# Patient Record
Sex: FEMALE | Race: BLACK OR AFRICAN AMERICAN | NOT HISPANIC OR LATINO | Employment: FULL TIME | URBAN - METROPOLITAN AREA
[De-identification: names, ages, dates, MRNs, and addresses within clinical notes are randomized per-mention and may not be internally consistent; named-entity substitution may affect disease eponyms.]

---

## 2017-01-17 ENCOUNTER — OFFICE VISIT (OUTPATIENT)
Dept: FAMILY MEDICINE | Facility: CLINIC | Age: 42
End: 2017-01-17
Payer: COMMERCIAL

## 2017-01-17 VITALS
HEART RATE: 110 BPM | TEMPERATURE: 98 F | SYSTOLIC BLOOD PRESSURE: 148 MMHG | BODY MASS INDEX: 44.38 KG/M2 | HEIGHT: 64 IN | OXYGEN SATURATION: 95 % | DIASTOLIC BLOOD PRESSURE: 78 MMHG | WEIGHT: 259.94 LBS

## 2017-01-17 DIAGNOSIS — G44.209 ACUTE NON INTRACTABLE TENSION-TYPE HEADACHE: Primary | ICD-10-CM

## 2017-01-17 PROCEDURE — 99214 OFFICE O/P EST MOD 30 MIN: CPT | Mod: S$GLB,,, | Performed by: FAMILY MEDICINE

## 2017-01-17 PROCEDURE — 99999 PR PBB SHADOW E&M-EST. PATIENT-LVL III: CPT | Mod: PBBFAC,,, | Performed by: FAMILY MEDICINE

## 2017-01-17 PROCEDURE — 1159F MED LIST DOCD IN RCRD: CPT | Mod: S$GLB,,, | Performed by: FAMILY MEDICINE

## 2017-01-17 RX ORDER — IBUPROFEN 800 MG/1
800 TABLET ORAL EVERY 8 HOURS
Qty: 60 TABLET | Refills: 0 | Status: SHIPPED | OUTPATIENT
Start: 2017-01-17 | End: 2017-02-16

## 2017-01-17 RX ORDER — TIZANIDINE 4 MG/1
4-8 TABLET ORAL NIGHTLY PRN
Qty: 60 TABLET | Refills: 0 | Status: SHIPPED | OUTPATIENT
Start: 2017-01-17 | End: 2017-01-27

## 2017-01-17 NOTE — PROGRESS NOTES
"Chief Complaint   Patient presents with    Headache       SUBJECTIVE:  Adama Ortiz is a 41 y.o. female here for new problem of headaches over past 2 weeks that start in the neck and radiate to skull and sometimes turn into pain behind right eye with photophobia and nausea, can be dull to debilitating, stress seems to worsen, no clear link to her menses, no neurological deficits noted by history.  Currently has co-morbidities including per problem list.      Social History   Substance Use Topics    Smoking status: Never Smoker    Smokeless tobacco: None    Alcohol use No       Patient Active Problem List    Diagnosis Date Noted    Hyperprolactinemia 08/11/2016    Prediabetes 08/10/2016    Morbid obesity with BMI of 40.0-44.9, adult 09/02/2015       ROS  Per HPI    OBJECTIVE:  Visit Vitals    BP (!) 148/78 (BP Location: Left arm, Patient Position: Sitting, BP Method: Manual)    Pulse 110    Temp 97.6 °F (36.4 °C) (Oral)    Ht 5' 4" (1.626 m)    Wt 117.9 kg (259 lb 14.8 oz)    LMP 01/03/2017    SpO2 95%    BMI 44.62 kg/m2       Wt Readings from Last 3 Encounters:   01/17/17 117.9 kg (259 lb 14.8 oz)   08/17/16 114.8 kg (253 lb 1.4 oz)   08/10/16 113 kg (249 lb 1.9 oz)     BP Readings from Last 3 Encounters:   01/17/17 (!) 148/78   08/17/16 120/90   08/10/16 140/90       She appears well, in no apparent distress.  Alert and oriented times three, pleasant and cooperative. Vital signs are as documented in vital signs section.  Frontal TTP  Eyes normal  Nose with mild coryza  S1 and S2 normal, no murmurs, clicks, gallops or rubs. Regular rate and rhythm. Chest is clear; no wheezes or rales. No edema or JVD.  No focal neurological deficits  Neck with increased muscle tone.    Review of old Records:  Reviewed per [facility]    Review of old labs:      Review of old imaging:    Narrative   MRI brain with and without contrast the    Comparison: None    There is no extra-axial fluid collection, midline shift, mass " or mass effect. There is no evidence of restricted diffusion. There is no evidence acute intracranial hemorrhage.    On postcontrast images there is homogenous enhancement of the pituitary.    There is no evidence enhancing cerebral lesion.   Impression     There is no evidence of intracranial abnormality. The pituitary enhances homogenously.  ______________________________________     Electronically signed by: SHARON ADAN MD  Date: 08/22/16  Time: 09:49        ASSESSMENT:  1. Acute non intractable tension-type headache        PLAN:  1. Acute non intractable tension-type headache  Potential medication side effects were discussed with the patient; let me know if any occur.    - ibuprofen (ADVIL,MOTRIN) 800 MG tablet; Take 1 tablet (800 mg total) by mouth every 8 (eight) hours.  Dispense: 60 tablet; Refill: 0  - tizanidine (ZANAFLEX) 4 MG tablet; Take 1-2 tablets (4-8 mg total) by mouth nightly as needed.  Dispense: 60 tablet; Refill: 0    Trial for tension type  If not improving will send to neurology for migraine work up in 40 year old female with obesity and severe type symptoms  Keep headache journal    Return in about 2 weeks (around 1/31/2017) for reassess acute condition.

## 2017-01-17 NOTE — MR AVS SNAPSHOT
[facility]  [address]  [address]  [place] [address]  Phone: [phone]  Fax: [phone]                  Adama Ortiz   2017 3:15 PM   Office Visit    Description:  Female : 1975   Provider:  Maurizio Prather MD   Department:  [facility]           Reason for Visit     Headache           Diagnoses this Visit        Comments    Acute non intractable tension-type headache    -  Primary            To Do List           Future Appointments        Provider Department Dept Phone    2017 2:45 PM Maurizio Prather MD [facility] [phone]      Goals (5 Years of Data)     None      Follow-Up and Disposition     Return in about 2 weeks (around 2017) for reassess acute condition.       These Medications        Disp Refills Start End    ibuprofen (ADVIL,MOTRIN) 800 MG tablet 60 tablet 0 2017    Take 1 tablet (800 mg total) by mouth every 8 (eight) hours. - Oral    Pharmacy: [address] Ph #: [phone]       tizanidine (ZANAFLEX) 4 MG tablet 60 tablet 0 2017    Take 1-2 tablets (4-8 mg total) by mouth nightly as needed. - Oral    Pharmacy: [address] Ph #: [phone]       Notes to Pharmacy: Tablets or capsules, whichever is cheaper for patient.      [facility]     [facility] On Call Nurse Care Line -  Assistance  Registered nurses in the [facility] provide clinical advisement, health education, appointment booking, and other advisory services.  Call for this free service at [phone].             Medications           Message regarding Medications     Verify the changes and/or additions to your medication regime listed below are the same as discussed with your clinician today.  If any of these changes or additions are incorrect, please notify your healthcare  "provider.        START taking these NEW medications        Refills    ibuprofen (ADVIL,MOTRIN) 800 MG tablet 0    Sig: Take 1 tablet (800 mg total) by mouth every 8 (eight) hours.    Class: Normal    Route: Oral    tizanidine (ZANAFLEX) 4 MG tablet 0    Sig: Take 1-2 tablets (4-8 mg total) by mouth nightly as needed.    Class: Normal    Route: Oral      STOP taking these medications     azithromycin (Z-CHINA) 250 MG tablet Take 1 tablet (250 mg total) by mouth once daily. Take as directed           Verify that the below list of medications is an accurate representation of the medications you are currently taking.  If none reported, the list may be blank. If incorrect, please contact your healthcare provider. Carry this list with you in case of emergency.           Current Medications     ibuprofen (ADVIL,MOTRIN) 800 MG tablet Take 1 tablet (800 mg total) by mouth every 8 (eight) hours.    tizanidine (ZANAFLEX) 4 MG tablet Take 1-2 tablets (4-8 mg total) by mouth nightly as needed.           Clinical Reference Information           Vital Signs - Last Recorded  Most recent update: 1/17/2017  3:13 PM by Phyllis Mcmahon MA    BP Pulse Temp Ht Wt LMP    (!) 148/78 (BP Location: Left arm, Patient Position: Sitting, BP Method: Manual) 110 97.6 °F (36.4 °C) (Oral) 5' 4" (1.626 m) 117.9 kg (259 lb 14.8 oz) 01/03/2017    SpO2 BMI             95% 44.62 kg/m2         Blood Pressure          Most Recent Value    BP  (!)  148/78      Allergies as of 1/17/2017     Aspirin    Keflex [Cephalexin]      Immunizations Administered on Date of Encounter - 1/17/2017     None      "

## 2017-01-31 ENCOUNTER — OFFICE VISIT (OUTPATIENT)
Dept: FAMILY MEDICINE | Facility: CLINIC | Age: 42
End: 2017-01-31
Payer: COMMERCIAL

## 2017-01-31 VITALS
OXYGEN SATURATION: 97 % | TEMPERATURE: 97 F | HEART RATE: 90 BPM | SYSTOLIC BLOOD PRESSURE: 148 MMHG | BODY MASS INDEX: 51.07 KG/M2 | DIASTOLIC BLOOD PRESSURE: 90 MMHG | HEIGHT: 60 IN | WEIGHT: 260.13 LBS

## 2017-01-31 DIAGNOSIS — R73.03 PREDIABETES: ICD-10-CM

## 2017-01-31 DIAGNOSIS — I10 ESSENTIAL HYPERTENSION: Primary | ICD-10-CM

## 2017-01-31 DIAGNOSIS — E22.1 HYPERPROLACTINEMIA: ICD-10-CM

## 2017-01-31 PROCEDURE — 3080F DIAST BP >= 90 MM HG: CPT | Mod: S$GLB,,, | Performed by: FAMILY MEDICINE

## 2017-01-31 PROCEDURE — 99999 PR PBB SHADOW E&M-EST. PATIENT-LVL III: CPT | Mod: PBBFAC,,, | Performed by: FAMILY MEDICINE

## 2017-01-31 PROCEDURE — 3077F SYST BP >= 140 MM HG: CPT | Mod: S$GLB,,, | Performed by: FAMILY MEDICINE

## 2017-01-31 PROCEDURE — 99214 OFFICE O/P EST MOD 30 MIN: CPT | Mod: S$GLB,,, | Performed by: FAMILY MEDICINE

## 2017-01-31 RX ORDER — HYDROCHLOROTHIAZIDE 12.5 MG/1
12.5 TABLET ORAL DAILY
Qty: 30 TABLET | Refills: 11 | Status: SHIPPED | OUTPATIENT
Start: 2017-01-31 | End: 2017-04-27 | Stop reason: SDUPTHER

## 2017-01-31 NOTE — PROGRESS NOTES
Chief Complaint   Patient presents with    Headache       SUBJECTIVE:  Adama Ortiz is a 41 y.o. female here for follow up of HTN and her trouble with prolactin and stress, improving with her social situation but still very much affecting her.  The patient is taking hypertensive medications compliantly without side effects.  Denies chest pain, dyspnea, edema, or TIA's.  .  Currently has co-morbidities including per problem list.      Social History   Substance Use Topics    Smoking status: Never Smoker    Smokeless tobacco: None    Alcohol use No       Patient Active Problem List    Diagnosis Date Noted    Hyperprolactinemia 08/11/2016    Prediabetes 08/10/2016    Morbid obesity with BMI of 40.0-44.9, adult 09/02/2015       Review of Systems   Constitutional: Positive for malaise/fatigue.   Cardiovascular: Negative.    Psychiatric/Behavioral: Positive for depression and memory loss. The patient is nervous/anxious and has insomnia.        OBJECTIVE:  Visit Vitals    BP (!) 148/90 (BP Location: Right arm, Patient Position: Sitting, BP Method: Manual)    Pulse 90    Temp 97.2 °F (36.2 °C) (Oral)    Ht 5' (1.524 m)    Wt 118 kg (260 lb 2.3 oz)    LMP 01/03/2017    SpO2 97%    BMI 50.81 kg/m2       Wt Readings from Last 3 Encounters:   01/31/17 118 kg (260 lb 2.3 oz)   01/17/17 117.9 kg (259 lb 14.8 oz)   08/17/16 114.8 kg (253 lb 1.4 oz)     BP Readings from Last 3 Encounters:   01/31/17 (!) 148/90   01/17/17 (!) 148/78   08/17/16 120/90       Obese  She appears well, in no apparent distress.  Alert and oriented times three, pleasant and cooperative. Vital signs are as documented in vital signs section.  Fundi are normal without hypertensive retinopathy. Chest is clear. Heart sounds normal, no gallop or murmur. No hepatosplenomegaly or pedal edema.  She was crying with her revelation of the stress    Review of old Records:  Reviewed per [facility]    Review of old labs:      Review of old  imaging:      ASSESSMENT:  1. Essential hypertension    2. Prediabetes    3. Hyperprolactinemia          PLAN:  1. Prediabetes  Noted  Continue diet and exercise    2. Essential hypertension  The current medical regimen is effective;  continue present plan and medications.  Get on program  - hydrochlorothiazide (HYDRODIURIL) 12.5 MG Tab; Take 1 tablet (12.5 mg total) by mouth once daily.  Dispense: 30 tablet; Refill: 11  - Hypertension Digital Medicine (HDMP)  Enrollment Order  - Assign HDMP Onboarding Questionnaire Series    3. Hyperprolactinemia  refer  - Ambulatory referral to Endocrinology    Return in about 4 weeks (around 2/28/2017) for assess treatment plan, HTN managment.

## 2017-02-02 ENCOUNTER — PATIENT MESSAGE (OUTPATIENT)
Dept: ADMINISTRATIVE | Facility: OTHER | Age: 42
End: 2017-02-02

## 2017-02-20 ENCOUNTER — PATIENT MESSAGE (OUTPATIENT)
Dept: ADMINISTRATIVE | Facility: OTHER | Age: 42
End: 2017-02-20

## 2017-03-16 ENCOUNTER — PATIENT MESSAGE (OUTPATIENT)
Dept: ADMINISTRATIVE | Facility: OTHER | Age: 42
End: 2017-03-16

## 2017-04-26 ENCOUNTER — PATIENT MESSAGE (OUTPATIENT)
Dept: ADMINISTRATIVE | Facility: OTHER | Age: 42
End: 2017-04-26

## 2017-04-27 DIAGNOSIS — I10 ESSENTIAL HYPERTENSION: ICD-10-CM

## 2017-04-27 RX ORDER — HYDROCHLOROTHIAZIDE 12.5 MG/1
12.5 TABLET ORAL DAILY
Qty: 30 TABLET | Refills: 0 | Status: SHIPPED | OUTPATIENT
Start: 2017-04-27 | End: 2017-05-16 | Stop reason: ALTCHOICE

## 2017-04-27 NOTE — TELEPHONE ENCOUNTER
Last ov 01/31/17     BP  148/90     Comprehensive metabolic panel   Order: [id number]   Status:  Final result   Visible to patient:  Yes (Patient Portal)   Next appt:  None   Dx:  Annual physical exam      Ref Range & Units 8mo ago     Sodium 136 - 145 mmol/L 140   Potassium 3.5 - 5.1 mmol/L 4.4   Chloride 95 - 110 mmol/L 106   CO2 23 - 29 mmol/L 27   Glucose 70 - 110 mg/dL 83   BUN, Bld 6 - 20 mg/dL 10   Creatinine 0.5 - 1.4 mg/dL 0.9   Calcium 8.7 - 10.5 mg/dL 9.4   Total Protein 6.0 - 8.4 g/dL 7.2   Albumin 3.5 - 5.2 g/dL 3.7   Total Bilirubin 0.1 - 1.0 mg/dL 1.0   Comments: For infants and newborns, interpretation of results should be based   on gestational age, weight and in agreement with clinical   observations.   Premature Infant recommended reference ranges:   Up to 24 hours.............<8.0 mg/dL   Up to 48 hours............<12.0 mg/dL   3-5 days..................<15.0 mg/dL   6-29 days.................<15.0 mg/dL      Alkaline Phosphatase 55 - 135 U/L 62   AST 10 - 40 U/L 24   ALT 10 - 44 U/L 21   Anion Gap 8 - 16 mmol/L 7 (L)   eGFR if African American >60 mL/min/1.73 m^2 >60   eGFR if non African American >60 mL/min/1.73 m^2 >60   Comments: Calculation used to obtain the estimated glomerular filtration   rate (eGFR) is the CKD-EPI equation. Since race is unknown   in our information system, the eGFR values for   -American and Non--American patients are given   for each creatinine result.      Resulting Agency  [facility]      Specimen Collected: 08/10/16 11:45 AM Last Resulted: 08/10/16  2:28 PM Lab Flowsheet Order Details View

## 2017-04-28 ENCOUNTER — PATIENT OUTREACH (OUTPATIENT)
Dept: OTHER | Facility: OTHER | Age: 42
End: 2017-04-28
Payer: COMMERCIAL

## 2017-04-28 NOTE — PROGRESS NOTES
Last 5 Patient Entered Readings                                                               Current 30 Day Average: 153/105     Recent Readings 4/28/2017 4/27/2017 4/26/2017    Systolic BP (mmHg) 160 142 157    Diastolic BP (mmHg) 107 101 109    Pulse 80 105 96        Initial introduction completed with the Mrs. Adama Ortiz and the role of the health  was explained.   We discuss diet and exercise on our next call as patient was busy at work.     Resources on diet and exercise were sent.     Patient is aware that I am not available for emergencies and to call 911 or [facility] on call if one arises.  Patient is aware of the importance of medication adherence.  Patient is aware of the importance of diet and exercise.  Patient is aware that his sodium intake should be no more than 2000mg per day.  Patient is aware that the recommended physical activity each week should be about 30 minutes per day at least 5 times per week.   Patient is aware of the importance of taking BP readings at least weekly if not more and during different times each day.  Patient is aware that the health  can be used as a resource for lifestyle modifications to help reduce or maintain a healthy BP

## 2017-05-01 ENCOUNTER — LAB VISIT (OUTPATIENT)
Dept: LAB | Facility: HOSPITAL | Age: 42
End: 2017-05-01
Attending: FAMILY MEDICINE
Payer: COMMERCIAL

## 2017-05-01 ENCOUNTER — OFFICE VISIT (OUTPATIENT)
Dept: FAMILY MEDICINE | Facility: CLINIC | Age: 42
End: 2017-05-01
Payer: COMMERCIAL

## 2017-05-01 ENCOUNTER — APPOINTMENT (OUTPATIENT)
Dept: RADIOLOGY | Facility: HOSPITAL | Age: 42
End: 2017-05-01
Attending: FAMILY MEDICINE
Payer: COMMERCIAL

## 2017-05-01 VITALS
HEIGHT: 63 IN | BODY MASS INDEX: 45.55 KG/M2 | DIASTOLIC BLOOD PRESSURE: 100 MMHG | SYSTOLIC BLOOD PRESSURE: 146 MMHG | HEART RATE: 84 BPM | TEMPERATURE: 98 F | OXYGEN SATURATION: 98 % | WEIGHT: 257.06 LBS | RESPIRATION RATE: 16 BRPM

## 2017-05-01 DIAGNOSIS — R07.9 CHEST PAIN, UNSPECIFIED TYPE: ICD-10-CM

## 2017-05-01 DIAGNOSIS — R06.02 SHORTNESS OF BREATH: ICD-10-CM

## 2017-05-01 DIAGNOSIS — R06.02 SHORTNESS OF BREATH: Primary | ICD-10-CM

## 2017-05-01 DIAGNOSIS — F43.9 STRESS: ICD-10-CM

## 2017-05-01 DIAGNOSIS — I10 ESSENTIAL HYPERTENSION: ICD-10-CM

## 2017-05-01 DIAGNOSIS — G47.00 INSOMNIA, UNSPECIFIED TYPE: ICD-10-CM

## 2017-05-01 LAB
ALBUMIN SERPL BCP-MCNC: 3.8 G/DL
ALP SERPL-CCNC: 52 U/L
ALT SERPL W/O P-5'-P-CCNC: 23 U/L
ANION GAP SERPL CALC-SCNC: 11 MMOL/L
AST SERPL-CCNC: 28 U/L
BASOPHILS # BLD AUTO: 0.04 K/UL
BASOPHILS NFR BLD: 0.4 %
BILIRUB SERPL-MCNC: 1.2 MG/DL
BNP SERPL-MCNC: <10 PG/ML
BUN SERPL-MCNC: 15 MG/DL
CALCIUM SERPL-MCNC: 9.5 MG/DL
CHLORIDE SERPL-SCNC: 104 MMOL/L
CO2 SERPL-SCNC: 24 MMOL/L
CREAT SERPL-MCNC: 0.9 MG/DL
DIFFERENTIAL METHOD: NORMAL
EOSINOPHIL # BLD AUTO: 0.2 K/UL
EOSINOPHIL NFR BLD: 1.8 %
ERYTHROCYTE [DISTWIDTH] IN BLOOD BY AUTOMATED COUNT: 12.8 %
EST. GFR  (AFRICAN AMERICAN): >60 ML/MIN/1.73 M^2
EST. GFR  (NON AFRICAN AMERICAN): >60 ML/MIN/1.73 M^2
GLUCOSE SERPL-MCNC: 80 MG/DL
HCT VFR BLD AUTO: 41.5 %
HGB BLD-MCNC: 14.3 G/DL
LYMPHOCYTES # BLD AUTO: 4.1 K/UL
LYMPHOCYTES NFR BLD: 40.3 %
MCH RBC QN AUTO: 29.2 PG
MCHC RBC AUTO-ENTMCNC: 34.5 %
MCV RBC AUTO: 85 FL
MONOCYTES # BLD AUTO: 0.8 K/UL
MONOCYTES NFR BLD: 8.1 %
NEUTROPHILS # BLD AUTO: 5 K/UL
NEUTROPHILS NFR BLD: 49.4 %
PLATELET # BLD AUTO: 309 K/UL
PMV BLD AUTO: 10.6 FL
POTASSIUM SERPL-SCNC: 4 MMOL/L
PROT SERPL-MCNC: 7.5 G/DL
RBC # BLD AUTO: 4.89 M/UL
SODIUM SERPL-SCNC: 139 MMOL/L
WBC # BLD AUTO: 10.06 K/UL

## 2017-05-01 PROCEDURE — 93005 ELECTROCARDIOGRAM TRACING: CPT | Mod: S$GLB,,, | Performed by: FAMILY MEDICINE

## 2017-05-01 PROCEDURE — 99999 PR PBB SHADOW E&M-EST. PATIENT-LVL III: CPT | Mod: PBBFAC,,, | Performed by: PHYSICIAN ASSISTANT

## 2017-05-01 PROCEDURE — 36415 COLL VENOUS BLD VENIPUNCTURE: CPT | Mod: PN

## 2017-05-01 PROCEDURE — 1160F RVW MEDS BY RX/DR IN RCRD: CPT | Mod: S$GLB,,, | Performed by: PHYSICIAN ASSISTANT

## 2017-05-01 PROCEDURE — 80053 COMPREHEN METABOLIC PANEL: CPT

## 2017-05-01 PROCEDURE — 71020 XR CHEST PA AND LATERAL: CPT | Mod: 26,,, | Performed by: RADIOLOGY

## 2017-05-01 PROCEDURE — 71020 XR CHEST PA AND LATERAL: CPT | Mod: TC,PN

## 2017-05-01 PROCEDURE — 3077F SYST BP >= 140 MM HG: CPT | Mod: S$GLB,,, | Performed by: PHYSICIAN ASSISTANT

## 2017-05-01 PROCEDURE — 93010 ELECTROCARDIOGRAM REPORT: CPT | Mod: S$GLB,,, | Performed by: INTERNAL MEDICINE

## 2017-05-01 PROCEDURE — 85025 COMPLETE CBC W/AUTO DIFF WBC: CPT

## 2017-05-01 PROCEDURE — 3080F DIAST BP >= 90 MM HG: CPT | Mod: S$GLB,,, | Performed by: PHYSICIAN ASSISTANT

## 2017-05-01 PROCEDURE — 99214 OFFICE O/P EST MOD 30 MIN: CPT | Mod: S$GLB,,, | Performed by: PHYSICIAN ASSISTANT

## 2017-05-01 PROCEDURE — 83880 ASSAY OF NATRIURETIC PEPTIDE: CPT

## 2017-05-01 RX ORDER — AMITRIPTYLINE HYDROCHLORIDE 10 MG/1
10 TABLET, FILM COATED ORAL NIGHTLY PRN
Qty: 30 TABLET | Refills: 0 | Status: SHIPPED | OUTPATIENT
Start: 2017-05-01 | End: 2018-12-04

## 2017-05-01 NOTE — MR AVS SNAPSHOT
[facility]  [address]  [address]  [place] [address]  Phone: [phone]  Fax: [phone]                  Adama Ortiz   2017 10:00 AM   Office Visit    Description:  Female : 1975   Provider:  KARLA Braga   Department:  [facility]           Reason for Visit     tightness in chest     Shortness of Breath     Cough           Diagnoses this Visit        Comments    Shortness of breath    -  Primary     Chest pain, unspecified type         Insomnia, unspecified type         Stress                To Do List           Goals (5 Years of Data)              Today    17    Blood Pressure <= 140/90   146/100  146/100  146/100    Notes - Note created  2017  2:56 PM by Prema Mcmanus    It is important to consistently maintain a controlled blood pressure.      Exercise at least 150 minutes per week.           Notes - Note created  2017  3:06 PM by Prema Mcmanus    Work on increasing your physical activity. A good goal to work toward is getting about 30 minutes of physical activity about 5 days per week.       Maintain a low sodium diet           Notes - Note created  2017  3:06 PM by Prema Mcmanus    Work on decreasing your sodium intake to no more then 2000mg of sodium per day. Read food labels and find low sodium brands. We advise that you avoid fast food and limit eating out at restaurants.       Take at least one BP reading per week at various times of the day           Weight (lb) < 0   116.6 kg (257 lb 0.9 oz)           These Medications        Disp Refills Start End    amitriptyline (ELAVIL) 10 MG tablet 30 tablet 0 2017    Take 1 tablet (10 mg total) by mouth nightly as needed for Insomnia. - Oral    Pharmacy: [address] TARUN METZGER [address] Ph #: [phone]         [facility]     [facility] Nurse Care Line -  Assistance  Unless  "otherwise directed by your provider, please contact [facility], our nurse care line that is available for 24/7 assistance.     Registered nurses in the [facility] provide: appointment scheduling, clinical advisement, health education, and other advisory services.  Call: [phone] (toll free)               Medications           Message regarding Medications     Verify the changes and/or additions to your medication regime listed below are the same as discussed with your clinician today.  If any of these changes or additions are incorrect, please notify your healthcare provider.        START taking these NEW medications        Refills    amitriptyline (ELAVIL) 10 MG tablet 0    Sig: Take 1 tablet (10 mg total) by mouth nightly as needed for Insomnia.    Class: Normal    Route: Oral           Verify that the below list of medications is an accurate representation of the medications you are currently taking.  If none reported, the list may be blank. If incorrect, please contact your healthcare provider. Carry this list with you in case of emergency.           Current Medications     amitriptyline (ELAVIL) 10 MG tablet Take 1 tablet (10 mg total) by mouth nightly as needed for Insomnia.    hydrochlorothiazide (HYDRODIURIL) 12.5 MG Tab Take 1 tablet (12.5 mg total) by mouth once daily. Due for appt           Clinical Reference Information           Your Vitals Were     BP Pulse Temp Resp Height Weight    146/100 84 98.2 °F (36.8 °C) (Oral) 16 5' 3" (1.6 m) 116.6 kg (257 lb 0.9 oz)    Last Period SpO2 BMI          04/11/2017 (Approximate) 98% 45.54 kg/m2        Blood Pressure          Most Recent Value    BP  (!)  146/100      Allergies as of 5/1/2017     Aspirin    Keflex [Cephalexin]      Immunizations Administered on Date of Encounter - 5/1/2017     None      Orders Placed During Today's Visit      Normal Orders This Visit    IN OFFICE EKG 12-LEAD (to [place])     Future Labs/Procedures Expected by " Expires    Brain natriuretic peptide  5/1/2017 7/30/2017    CBC auto differential  5/1/2017 6/30/2018    Comprehensive metabolic panel  5/1/2017 5/1/2018    X-Ray Chest PA And Lateral  5/1/2017 5/1/2018      Language Assistance Services     ATTENTION: Language assistance services are available, free of charge. Please call [phone].      ATENCIÓN: Si habla español, tiene a gipson disposición servicios gratuitos de asistencia lingüística. Llame al [phone].     CHÚ Ý: N?u b?n nói Ti?ng Vi?t, có các d?ch v? h? tr? ngôn ng? mi?n phí dành cho b?n. G?i s? [phone].         [facility] complies with applicable Federal civil rights laws and does not discriminate on the basis of race, color, national origin, age, disability, or sex.

## 2017-05-01 NOTE — LETTER
May 1, 2017    Adama Ortiz  [address] [place] [address]         [facility]  [address]  [address]  [place] [address]  Phone: [phone]  Fax: [phone] May 1, 2017     Patient: Adama Ortiz   YOB: 1975   Date of Visit: 5/1/2017       To Whom It May Concern:    It is my medical opinion that Adama Ortiz may return to work on 5/2/17.    If you have any questions or concerns, please don't hesitate to call.    Sincerely,        KARLA Braga

## 2017-05-01 NOTE — PROGRESS NOTES
Subjective:       Patient ID: Adama Ortiz is a 41 y.o. female with multiple medical diagnoses as listed in the medical history and problem list that presents for tightness in chest (EMT last night ekg normal, said it was anxiety); Shortness of Breath; and Cough (4 days)  .    Chief Complaint: tightness in chest (EMT last night ekg normal, said it was anxiety); Shortness of Breath; and Cough (4 days)      Shortness of Breath   This is a new problem. The current episode started 1 to 4 weeks ago (3-4 weeks ). The problem occurs constantly. The problem has been gradually worsening. Associated symptoms include chest pain (pressure on chest ). Pertinent negatives include no abdominal pain, ear pain, fever, headaches, leg swelling, rhinorrhea, sore throat, sputum production, vomiting or wheezing. The symptoms are aggravated by emotional upset and lying flat (sitting and laying down ). Associated symptoms comments: Bloated . The patient has no known risk factors for DVT/PE. She has tried nothing for the symptoms. There is no history of asthma (exercised induced as a child ) or bronchiolitis.   Cough   This is a new problem. The current episode started in the past 7 days (4 days ). The problem has been unchanged. The cough is non-productive. Associated symptoms include chest pain (pressure on chest ) and shortness of breath. Pertinent negatives include no chills, ear pain, eye redness, fever, headaches, postnasal drip, rhinorrhea, sore throat or wheezing. She has tried nothing for the symptoms. There is no history of asthma (exercised induced as a child ).   Hypertension   This is a new (did not take medication this am ) problem. The current episode started more than 1 month ago. Associated symptoms include anxiety (stress from work and home and  [place] ), chest pain (pressure on chest ), palpitations and shortness of breath. Pertinent negatives include no blurred vision, headaches or peripheral edema. There  are no associated agents to hypertension. Risk factors for coronary artery disease include obesity, sedentary lifestyle and stress. Past treatments include diuretics. The current treatment provides moderate improvement.     Review of Systems   Constitutional: Positive for diaphoresis (sunday improved ). Negative for chills and fever.   HENT: Negative for congestion, ear pain, postnasal drip, rhinorrhea, sinus pressure, sneezing and sore throat.    Eyes: Negative for blurred vision, pain, discharge, redness and itching.   Respiratory: Positive for cough (3-4 days ago; phelgm ), chest tightness and shortness of breath. Negative for sputum production and wheezing.    Cardiovascular: Positive for chest pain (pressure on chest ) and palpitations. Negative for leg swelling.   Gastrointestinal: Positive for nausea (sunday improved ). Negative for abdominal pain, constipation, diarrhea and vomiting.   Endocrine: Negative for cold intolerance, heat intolerance, polydipsia and polyuria.   Musculoskeletal: Positive for back pain (between shoulder blade ).        Leg pain= pulsating    Neurological: Positive for dizziness (sunday improved ). Negative for headaches.         PAST MEDICAL HISTORY:  History reviewed. No pertinent past medical history.    SOCIAL HISTORY:  Social History     Social History    Marital status:      Spouse name: N/A    Number of children: N/A    Years of education: N/A     Occupational History    Not on file.     Social History Main Topics    Smoking status: Never Smoker    Smokeless tobacco: Not on file    Alcohol use No    Drug use: Not on file    Sexual activity: Yes     Partners: Male     Birth control/ protection: None     Other Topics Concern    Not on file     Social History Narrative       ALLERGIES AND MEDICATIONS: updated and reviewed.  Review of patient's allergies indicates:   Allergen Reactions    Aspirin Swelling    Keflex [cephalexin] Swelling and Rash     Current  "Outpatient Prescriptions   Medication Sig Dispense Refill    amitriptyline (ELAVIL) 10 MG tablet Take 1 tablet (10 mg total) by mouth nightly as needed for Insomnia. 30 tablet 0    hydrochlorothiazide (HYDRODIURIL) 12.5 MG Tab Take 1 tablet (12.5 mg total) by mouth once daily. Due for appt 30 tablet 0     No current facility-administered medications for this visit.          Objective:   BP (!) 146/100  Pulse 84  Temp 98.2 °F (36.8 °C) (Oral)   Resp 16  Ht 5' 3" (1.6 m)  Wt 116.6 kg (257 lb 0.9 oz)  LMP 04/11/2017 (Approximate)  SpO2 98%  BMI 45.54 kg/m2     Physical Exam   Constitutional: She is oriented to person, place, and time.   HENT:   Head: Normocephalic and atraumatic.   Eyes: Conjunctivae and EOM are normal.   Cardiovascular: Normal rate and regular rhythm.    Pulmonary/Chest: Effort normal and breath sounds normal. She has no wheezes.   Abdominal: Soft. Bowel sounds are normal.   Lymphadenopathy:     She has no cervical adenopathy.   Neurological: She is alert and oriented to person, place, and time.   Skin: Skin is warm. No erythema.   Psychiatric: Her behavior is normal. Thought content normal. Her mood appears anxious. Her speech is not rapid and/or pressured. Cognition and memory are normal. She exhibits a depressed mood (in tears ).           Assessment:       1. Shortness of breath    2. Chest pain, unspecified type    3. Insomnia, unspecified type    4. Stress        Plan:       Shortness of breath  -     X-Ray Chest PA And Lateral; Future; Expected date: 5/1/17  -     CBC auto differential; Future; Expected date: 5/1/17  -     IN OFFICE EKG 12-LEAD (to [place]): NSR  -     Brain natriuretic peptide; Future; Expected date: 5/1/17    Chest pain, unspecified type  -     Comprehensive metabolic panel; Future; Expected date: 5/1/17    Insomnia, unspecified type  -     amitriptyline (ELAVIL) 10 MG tablet; Take 1 tablet (10 mg total) by mouth nightly as needed for Insomnia.  Dispense: 30 tablet; " Refill: 0    Stress  Discussed possibly trying Buspar prn if it does not make her too drowsy. Will try to treat her sleep and see if that improves her symptoms.   I believe this is the cause of her issues and not truly cardiac. Can consider stress test if work up normal.     Essential hypertension    go home and take medication,she did not take it today.     No Follow-up on file.

## 2017-05-12 ENCOUNTER — PATIENT OUTREACH (OUTPATIENT)
Dept: OTHER | Facility: OTHER | Age: 42
End: 2017-05-12
Payer: COMMERCIAL

## 2017-05-12 DIAGNOSIS — I10 ESSENTIAL HYPERTENSION: Primary | ICD-10-CM

## 2017-05-12 NOTE — PROGRESS NOTES
Last 5 Patient Entered Readings                                                               Current 30 Day Average: 144/99     Recent Readings 5/12/2017 5/11/2017 5/10/2017 5/8/2017 5/4/2017    Systolic BP (mmHg) 144 152 143 134 147    Diastolic BP (mmHg) 100 99 97 94 92    Pulse 82 85 84 80 83        Hypertension Medications             hydrochlorothiazide (HYDRODIURIL) 12.5 MG Tab Take 1 tablet (12.5 mg total) by mouth once daily. Due for appt        Called patient to introduce her into the HDM (hypertension digital medicine) clinic. Patient requests I call back after 430pm. WCB on Monday.

## 2017-05-12 NOTE — LETTER
"   Mary Krause, PharmD  [address] [place] [address]     Dear Adama Ortiz,    Welcome to the [facility]!         My name is Mary Krause and I am your dedicated clinical pharmacist.  As an expert in medication management, I will help ensure that the medications you are taking continue to provide you with the intended benefits.      This is Prema Mcmanus and she will be your health  for the duration of the program.  Her job is to help you identify lifestyle changes to improve your blood pressure control.  You will talk about nutrition, exercise, and other ways that you may be able to adjust your current habits to better your health. Together, we will work to improve your overall health and encourage you to meet your goals for a healthier lifestyle.    What we expect from YOU:    You will need to take blood pressure readings multiple times a week and no less than one reading per week.   It is important that you take your measurements at different times during the day, when possible.     What you should expect from your Digital Medicine Care Team:   We will provide you with education about high blood pressure, including lifestyle changes that could help you to control your blood pressure.   We will review your weekly readings and provide you with monthly blood pressure progress reports after you have been in the program for more than 30 days.   We will send monthly progress reports on your blood pressure control to your physician so they can follow along with your progress as well.    You will be able to reach me by phone at [phone] or through your [facility] account by clicking "Send a message to your doctor's office" on the home screen then selecting my name in the "To the office of:" field.     I look forward to working with you to achieve your blood pressure goals!    Sincerely,    Mary Krause, Kindra  Your personal Clinical " Pharmacist    Please visit [email or web address] to learn more about high blood pressure and what you can do lower your blood pressure.                                                                                         Adama Ortiz  [address] [place] [address]

## 2017-05-15 NOTE — PROGRESS NOTES
Last 5 Patient Entered Readings                                                               Current 30 Day Average: 144/100     Recent Readings 5/15/2017 5/12/2017 5/11/2017 5/10/2017 5/8/2017    Systolic BP (mmHg) 144 144 152 143 134    Diastolic BP (mmHg) 102 100 99 97 94    Pulse 73 82 85 84 80        Hypertension Medications             hydrochlorothiazide (HYDRODIURIL) 12.5 MG Tab Take 1 tablet (12.5 mg total) by mouth once daily. Due for appt        Called patient to introduce her into the HDM (hypertension digital medicine) clinic. LVM.   Will continue to monitor.  WCB in 1 week.

## 2017-05-16 RX ORDER — CHLORTHALIDONE 25 MG/1
25 TABLET ORAL EVERY MORNING
Qty: 30 TABLET | Refills: 11 | Status: SHIPPED | OUTPATIENT
Start: 2017-05-16 | End: 2017-09-26 | Stop reason: SDUPTHER

## 2017-05-16 NOTE — PROGRESS NOTES
Last 5 Patient Entered Readings                                                               Current 30 Day Average: 144/101     Recent Readings 5/16/2017 5/15/2017 5/12/2017 5/11/2017 5/10/2017    Systolic BP (mmHg) 141 144 144 152 143    Diastolic BP (mmHg) 113 102 100 99 97    Pulse 83 73 82 85 84        Called patient to introduce her into the HDM (hypertension digital medicine) clinic.  Patient states she is under a tremendous amount of stress. Patient states she had a panic attack in the middle of the night recently.  Patient states she has started seeing a therapist.     BP is not well controlled.  Will stop HCTZ and start chlorthalidone 25mg.     Verified the following information with the patient:  Drug allergies  Medication adherence- Patient states she is adherent.      Screening questionnaires:  Depression- not indicated     Sleep apnea- not indicated     Explained that we expect her to obtain several blood pressures/week at random times of day.      Explained that our goal is to get her BP to consistently below 140/90mmHg.      Patient and I agreed that the patient will take her BP daily to every other day at varying times of the day.      I will plan to follow-up with the patient in 2 weeks.     Emailed patient link to [facility] webpage as well as my direct phone number in case she has in questions.

## 2017-05-22 ENCOUNTER — PATIENT OUTREACH (OUTPATIENT)
Dept: OTHER | Facility: OTHER | Age: 42
End: 2017-05-22
Payer: COMMERCIAL

## 2017-05-22 NOTE — PROGRESS NOTES
Last 5 Patient Entered Readings                                                               Current 30 Day Average: 143/100     Recent Readings 5/20/2017 5/16/2017 5/16/2017 5/15/2017 5/12/2017    Systolic BP (mmHg) 132 140 141 144 144    Diastolic BP (mmHg) 108 92 113 102 100    Pulse 83 95 83 73 82        Hypertension Medications             chlorthalidone (HYGROTEN) 25 MG Tab Take 1 tablet (25 mg total) by mouth every morning. Stop HCTZ.        Plan:   Called patient to follow up since stopping hctz and starting chlorthalidone. Patient c/o fatigue, which may be due to medication. Per current 30 day average, BP is not well controlled.  Will continue to monitor. WCB in 2 weeks.

## 2017-05-29 ENCOUNTER — PATIENT OUTREACH (OUTPATIENT)
Dept: OTHER | Facility: OTHER | Age: 42
End: 2017-05-29
Payer: COMMERCIAL

## 2017-05-29 NOTE — PROGRESS NOTES
Last 5 Patient Entered Readings                                                               Current 30 Day Average: 139/99     Recent Readings 5/29/2017 5/24/2017 5/23/2017 5/23/2017 5/22/2017    Systolic BP (mmHg) 138 137 143 145 139    Diastolic BP (mmHg) 91 96 105 115 96    Pulse 76 90 90 87 99        LVM to follow up with Mrs. Adama Ortiz. Inquired about how her low sodium diet and exercise is going. Overall, her readings are improving but still uncontrolled. I reminded her about her restrictions with sodium and the importance of following a low sodium diet in order to keep her BP controlled. Reminded her about my role and encouraged her to reach out to me with any specific questions. Advised pt to call or message back if she has any questions or concerns.    Patient called back and informed me that she is doing well. She has been reviewing the Dash diet and working on following that. She is going to purchase a recipe book to go along with it. She is reading food labels and working on making changes. She will continue to do this and she will let me know if she has any other specific questions.

## 2017-06-05 ENCOUNTER — PATIENT OUTREACH (OUTPATIENT)
Dept: OTHER | Facility: OTHER | Age: 42
End: 2017-06-05
Payer: COMMERCIAL

## 2017-06-05 NOTE — PROGRESS NOTES
Last 5 Patient Entered Readings                                                               Current 30 Day Average: 139/97     Recent Readings 6/4/2017 6/3/2017 5/31/2017 5/30/2017 5/29/2017    Systolic BP (mmHg) 135 149 141 135 138    Diastolic BP (mmHg) 94 97 101 88 91    Pulse 81 77 75 74 76        Hypertension Medications             chlorthalidone (HYGROTEN) 25 MG Tab Take 1 tablet (25 mg total) by mouth every morning. Stop HCTZ.        Plan:   Called patient to follow up. Per current 30 day average, BP is not well controlled. Patient denies having questions or concerns. Will continue to monitor. WCB in 2 weeks.

## 2017-06-19 ENCOUNTER — PATIENT OUTREACH (OUTPATIENT)
Dept: OTHER | Facility: OTHER | Age: 42
End: 2017-06-19
Payer: COMMERCIAL

## 2017-06-19 NOTE — PROGRESS NOTES
Last 5 Patient Entered Readings                                                               Current 30 Day Average: 139/96     Recent Readings 6/15/2017 6/15/2017 6/14/2017 6/12/2017 6/11/2017    Systolic BP (mmHg) 137 147 134 131 135    Diastolic BP (mmHg) 99 125 91 91 91    Pulse 104 102 99 87 80        Hypertension Medications             chlorthalidone (HYGROTEN) 25 MG Tab Take 1 tablet (25 mg total) by mouth every morning. Stop HCTZ.        Plan:   Called patient to follow up. Patient is having trouble with cuff. Patient states when cuff is inflating it is cutting off the circulation in her finger tips, causing tingling.  Patient states this is uncomfortable and will often close her eyes to deal with the discomfort.  Instructed patient to bring to the [facility] for evaluation.  Patient plans to do this on 6/22.  If cuff is causing this much discomfort/ pain, her readings might be falsely elevated.  Patient also c/o fatigue with chlorthalidone.  After patient goes to the [facility], will discuss going back to HCTZ and possibly adding on amlodipine 5mg qhs.   Per current 30 day average, BP is not well controlled. Patient denies having further questions or concerns. Will continue to monitor. WCB on 6/23.

## 2017-06-23 ENCOUNTER — PATIENT OUTREACH (OUTPATIENT)
Dept: OTHER | Facility: OTHER | Age: 42
End: 2017-06-23

## 2017-06-23 NOTE — PROGRESS NOTES
Last 5 Patient Entered Readings                                                               Current 30 Day Average: 138/94     Recent Readings 6/22/2017 6/15/2017 6/15/2017 6/14/2017 6/12/2017    Systolic BP (mmHg) 131 137 147 134 131    Diastolic BP (mmHg) 94 99 125 91 91    Pulse 88 104 102 99 87        Hypertension Medications             chlorthalidone (HYGROTEN) 25 MG Tab Take 1 tablet (25 mg total) by mouth every morning. Stop HCTZ.        Plan:   Called patient to follow up. Patient states she was unable to go to the [facility] yesterday for cuff evaluation, plan to go today.  WCB on Monday.  Will likely change regimen to hctz 25 and amlodipine 5mg due to fatigue caused by chlorthalidone.

## 2017-06-26 NOTE — PROGRESS NOTES
Last 5 Patient Entered Readings                                                               Current 30 Day Average: 139/95     Recent Readings 6/25/2017 6/23/2017 6/22/2017 6/15/2017 6/15/2017    Systolic BP (mmHg) 144 154 131 137 147    Diastolic BP (mmHg) 101 103 94 99 125    Pulse 92 89 88 104 102        Hypertension Medications             chlorthalidone (HYGROTEN) 25 MG Tab Take 1 tablet (25 mg total) by mouth every morning. Stop HCTZ.        Plan:   Called patient to follow up. Per current 30 day average, BP is well controlled. LVM, requested patient to call back.   Will continue to monitor. WCB in 1 week.

## 2017-06-30 ENCOUNTER — PATIENT OUTREACH (OUTPATIENT)
Dept: OTHER | Facility: OTHER | Age: 42
End: 2017-06-30

## 2017-06-30 NOTE — PROGRESS NOTES
Last 5 Patient Entered Readings                                                               Current 30 Day Average: 139/96     Recent Readings 6/29/2017 6/26/2017 6/25/2017 6/23/2017 6/22/2017    Systolic BP (mmHg) 142 133 144 154 131    Diastolic BP (mmHg) 89 95 101 103 94    Pulse 94 93 92 89 88        LVM to follow up with Mrs. Adama Ortiz. Inquired about how her low sodium diet and exercise is going. We spoke last time about making changes with her sodium intake and she was trying to read food labels more and was starting to focus on the dash diet etc. Her diastolic is still not within range so I want to see how she is doing with this and if she has any specific questions. Requested  pt to call or message back so we can discuss this.

## 2017-07-03 NOTE — PROGRESS NOTES
Last 5 Patient Entered Readings                                                               Current 30 Day Average: 139/95     Recent Readings 6/29/2017 6/26/2017 6/25/2017 6/23/2017 6/22/2017    Systolic BP (mmHg) 142 133 144 154 131    Diastolic BP (mmHg) 89 95 101 103 94    Pulse 94 93 92 89 88        Hypertension Medications             chlorthalidone (HYGROTEN) 25 MG Tab Take 1 tablet (25 mg total) by mouth every morning. Stop HCTZ.        Plan:   Called patient to follow up. Per current 30 day average, DBP is not well controlled. Patient states she went to the [facility], cuff currently working.  Patient states she is now taking chlorthalidone with food, not as fatigued.   Patient denies having questions or concerns. Will continue to monitor. WCB in 1 month.

## 2017-07-31 ENCOUNTER — PATIENT OUTREACH (OUTPATIENT)
Dept: OTHER | Facility: OTHER | Age: 42
End: 2017-07-31

## 2017-07-31 NOTE — PROGRESS NOTES
Last 5 Patient Entered Redings Current 30 Day Average: 140/98     Recent Readings 7/15/2017 7/14/2017 7/12/2017 7/11/2017 7/10/2017    Systolic BP (mmHg) 146 137 137 141 140    Diastolic BP (mmHg) 106 96 93 94 101    Pulse 89 85 94 88 78        Hypertension Medications             chlorthalidone (HYGROTEN) 25 MG Tab Take 1 tablet (25 mg total) by mouth every morning. Stop HCTZ.        Plan:   Called patient to follow up. Per current 30 day average, BP is well controlled. LVM, requested patient call back and take a reading at her convenience (last reading was taken on 7/15).   Will continue to monitor. WCB in 2 weeks.

## 2017-08-04 NOTE — PROGRESS NOTES
Last 5 Patient Entered Redings Current 30 Day Average: 142/99     Recent Readings 8/3/2017 8/2/2017 8/2/2017 7/15/2017 7/14/2017    Systolic BP (mmHg) 142 150 152 146 137    Diastolic BP (mmHg) 99 105 107 106 96    Pulse 89 86 80 89 85          LVM to follow up with Mrs. Adama Ortiz. Inquired about how her low sodium diet and exercise is going. Advised pt to call or message back if she has any questions or concerns.    Are the patients BP readings overall controlled? no  Does the patient take weekly BP readings? yes  Did you request for the patient to call or message you back? yes  If yes, then why? Patients readings remain out of range so I want to discuss this with her. I also want to see if she has missed any BP medications or any questions about her low sodium diet (she is likely getting to much salt in her diet).     Reminded patient about what to do incase of a medical emergency (call 911, call [facility] on call or go to the ER).     Provided patient with my contact information.

## 2017-08-05 ENCOUNTER — PATIENT MESSAGE (OUTPATIENT)
Dept: ADMINISTRATIVE | Facility: OTHER | Age: 42
End: 2017-08-05

## 2017-08-15 NOTE — PROGRESS NOTES
Last 5 Patient Entered Redings Current 30 Day Average: 140/96     Recent Readings 8/7/2017 8/5/2017 8/5/2017 8/3/2017 8/2/2017    Systolic BP (mmHg) 131 138 132 142 150    Diastolic BP (mmHg) 85 96 90 99 105    Pulse 88 102 89 89 86        Hypertension Medications             chlorthalidone (HYGROTEN) 25 MG Tab Take 1 tablet (25 mg total) by mouth every morning. Stop HCTZ.        Plan:   Called patient to follow up. Readings are not transmitting, will request for IT to reach out.   Per current 30 day average, SBP is well controlled, DBP is uncontrolled.   Patient denies having questions or concerns. Will continue to monitor. WCB in 1 month.

## 2017-08-30 NOTE — PROGRESS NOTES
Last 5 Patient Entered Redings Current 30 Day Average: 135/91     Recent Readings 8/23/2017 8/23/2017 8/17/2017 8/17/2017 8/7/2017    Systolic BP (mmHg) 134 137 113 142 131    Diastolic BP (mmHg) 93 93 68 92 85    Pulse 75 84 83 88 88          Hypertension Digital Medicine Program (HDMP): Health  Follow Up    Lifestyle Modifications:    1. Low sodium diet: no : Patient is struggling with this right now because her aunt and grandparents live with her and her  and her aunt does all of the shopping and cooking. Patient aunt does not read food labels and adds salt etc (she does not follow a low sodium diet). She has tried to get her to cut back and even though she did, it is still not enough. Patient has started cooking her own meals at home but again, all the groceries are coming from her aunt who does not read labels. Patient has also been eating out this year since everyone moved into her home just to get out etc. Patient is going to start focusing on this even more and I even offered to speak with her aunt if her aunt is interested in making changes.     2.Physical activity: Deferred    3.Hypotension/Hypertension symptoms: no   Frequency/Alleviating factors/Precipitating factors, etc.     4. Medication Adherence? Per patient yes     Patient informed me that her cuff is still giving her trouble even after speaking with Claudia . It tells her that the measurement failed and it usually takes about 20 tries before she can successfully take a reading which is frustrating for her. Patient has a Withings cuff so she is going to try and change the batteries and if that does not work she will let me know and we will see about getting her cuff switched out.     Follow up with Mrs. Adama Ortiz completed. Patient did not have any further questions or concerns. I will follow up in a few weeks to see how she is doing and progressing.

## 2017-09-12 ENCOUNTER — PATIENT OUTREACH (OUTPATIENT)
Dept: OTHER | Facility: OTHER | Age: 42
End: 2017-09-12

## 2017-09-12 DIAGNOSIS — I10 ESSENTIAL HYPERTENSION: Primary | ICD-10-CM

## 2017-09-12 NOTE — PROGRESS NOTES
Last 5 Patient Entered Redings Current 30 Day Average: 136/91     Recent Readings 9/11/2017 9/7/2017 9/7/2017 9/6/2017 9/5/2017    Systolic BP (mmHg) 139 148 149 148 133    Diastolic BP (mmHg) 92 95 94 93 91    Pulse 84 87 83 81 80        Hypertension Medications             chlorthalidone (HYGROTEN) 25 MG Tab Take 1 tablet (25 mg total) by mouth every morning. Stop HCTZ.        Plan:   Called patient to follow up. Per current 30 day average, BP is controlled/ borderline.   LVM, requested patient call back.  Will continue to monitor. WCB in 2 weeks.

## 2017-09-18 ENCOUNTER — OFFICE VISIT (OUTPATIENT)
Dept: FAMILY MEDICINE | Facility: CLINIC | Age: 42
End: 2017-09-18
Payer: COMMERCIAL

## 2017-09-18 VITALS
DIASTOLIC BLOOD PRESSURE: 88 MMHG | HEART RATE: 71 BPM | OXYGEN SATURATION: 100 % | BODY MASS INDEX: 51.11 KG/M2 | HEIGHT: 59 IN | WEIGHT: 253.5 LBS | SYSTOLIC BLOOD PRESSURE: 130 MMHG | TEMPERATURE: 97 F

## 2017-09-18 DIAGNOSIS — S86.112A STRAIN OF LEFT SOLEUS MUSCLE, INITIAL ENCOUNTER: Primary | ICD-10-CM

## 2017-09-18 PROCEDURE — 99999 PR PBB SHADOW E&M-EST. PATIENT-LVL III: CPT | Mod: PBBFAC,,, | Performed by: FAMILY MEDICINE

## 2017-09-18 PROCEDURE — 99214 OFFICE O/P EST MOD 30 MIN: CPT | Mod: S$GLB,,, | Performed by: FAMILY MEDICINE

## 2017-09-18 PROCEDURE — 3075F SYST BP GE 130 - 139MM HG: CPT | Mod: S$GLB,,, | Performed by: FAMILY MEDICINE

## 2017-09-18 PROCEDURE — 3079F DIAST BP 80-89 MM HG: CPT | Mod: S$GLB,,, | Performed by: FAMILY MEDICINE

## 2017-09-18 PROCEDURE — 3008F BODY MASS INDEX DOCD: CPT | Mod: S$GLB,,, | Performed by: FAMILY MEDICINE

## 2017-09-18 RX ORDER — TIZANIDINE 4 MG/1
4 TABLET ORAL NIGHTLY PRN
Qty: 30 TABLET | Refills: 0 | Status: SHIPPED | OUTPATIENT
Start: 2017-09-18 | End: 2017-10-18

## 2017-09-18 RX ORDER — DICLOFENAC SODIUM 10 MG/G
2 GEL TOPICAL 4 TIMES DAILY
Qty: 100 G | Refills: 1 | Status: SHIPPED | OUTPATIENT
Start: 2017-09-18 | End: 2018-12-04

## 2017-09-18 NOTE — PROGRESS NOTES
"Chief Complaint   Patient presents with    Ankle Pain       SUBJECTIVE:  Adama Ortiz is a 42 y.o. 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  She hasn't taken anything.  She has no symptoms at night..  Currently has co-morbidities including per problem list.        Taking weight off eases the pain.    History reviewed. No pertinent past medical history.  Past Surgical History:   Procedure Laterality Date    APPENDECTOMY      BREAST SURGERY      DILATION AND CURETTAGE OF UTERUS       Social History     Social History    Marital status:      Spouse name: N/A    Number of children: N/A    Years of education: N/A     Occupational History    Not on file.     Social History Main Topics    Smoking status: Never Smoker    Smokeless tobacco: Never Used    Alcohol use No    Drug use: Unknown    Sexual activity: Yes     Partners: Male     Birth control/ protection: None     Other Topics Concern    Not on file     Social History Narrative    No narrative on file     Family History   Problem Relation Age of Onset    Hypertension Mother     Hyperlipidemia Mother      Current Outpatient Prescriptions on File Prior to Visit   Medication Sig Dispense Refill    amitriptyline (ELAVIL) 10 MG tablet Take 1 tablet (10 mg total) by mouth nightly as needed for Insomnia. 30 tablet 0     No current facility-administered medications on file prior to visit.      Review of patient's allergies indicates:   Allergen Reactions    Aspirin Swelling    Keflex [cephalexin] Swelling and Rash         ROS  Per HPI      OBJECTIVE:  /88   Pulse 71   Temp 96.7 °F (35.9 °C) (Oral)   Ht 4' 11" (1.499 m)   Wt 115 kg (253 lb 8.5 oz)   LMP 09/18/2017   SpO2 100%   BMI 51.21 kg/m²     Wt Readings from " Last 3 Encounters:   09/18/17 115 kg (253 lb 8.5 oz)   05/01/17 116.6 kg (257 lb 0.9 oz)   01/31/17 118 kg (260 lb 2.3 oz)     BP Readings from Last 3 Encounters:   09/18/17 130/88   05/01/17 (!) 146/100   01/31/17 (!) 148/90       She appears well, in no apparent distress.  Alert and oriented times three, pleasant and cooperative. Vital signs are as documented in vital signs section.  Left medial calf pain and tight tendon    Review of old Records:  Reviewed per [facility]    Review of old labs:      Review of old imaging:      ASSESSMENT:  Problem List Items Addressed This Visit     None      Visit Diagnoses     Strain of left soleus muscle, initial encounter    -  Primary    Relevant Medications    diclofenac sodium (VOLTAREN) 1 % Gel    tizanidine (ZANAFLEX) 4 MG tablet          ICD-10-CM ICD-9-CM   1. Strain of left soleus muscle, initial encounter S86.112A 844.8         PLAN:  1. Strain of left soleus muscle, initial encounter  Potential medication side effects were discussed with the patient; let me know if any occur.    - diclofenac sodium (VOLTAREN) 1 % Gel; Apply 2 g topically 4 (four) times daily.  Dispense: 100 g; Refill: 1  - tizanidine (ZANAFLEX) 4 MG tablet; Take 1 tablet (4 mg total) by mouth nightly as needed.  Dispense: 30 tablet; Refill: 0      Medication List with Changes/Refills   New Medications    DICLOFENAC SODIUM (VOLTAREN) 1 % GEL    Apply 2 g topically 4 (four) times daily.    TIZANIDINE (ZANAFLEX) 4 MG TABLET    Take 1 tablet (4 mg total) by mouth nightly as needed.   Current Medications    AMITRIPTYLINE (ELAVIL) 10 MG TABLET    Take 1 tablet (10 mg total) by mouth nightly as needed for Insomnia.   Changed and/or Refilled Medications    Modified Medication Previous Medication    CHLORTHALIDONE (HYGROTEN) 25 MG TAB chlorthalidone (HYGROTEN) 25 MG Tab       Take 1 tablet (25 mg total) by mouth every morning. Stop HCTZ.    Take 1 tablet (25 mg total) by mouth every morning. Stop HCTZ.       Return  in about 4 weeks (around 10/16/2017) for assess treatment plan.

## 2017-09-25 ENCOUNTER — PATIENT OUTREACH (OUTPATIENT)
Dept: OTHER | Facility: OTHER | Age: 42
End: 2017-09-25

## 2017-09-25 NOTE — PROGRESS NOTES
Last 5 Patient Entered Redings Current 30 Day Average: 140/95     Recent Readings 9/13/2017 9/11/2017 9/7/2017 9/7/2017 9/6/2017    Systolic BP (mmHg) - 139 148 149 148    Diastolic BP (mmHg) - 92 95 94 93    Pulse 77 84 87 83 81          Hypertension Digital Medicine (HDMP) Health  Follow Up    LVM to follow up with Mrs. Adama Ortiz.    Per 30 day average, blood pressure is not well controlled 140/95 mmHg. Encouraged adherence to low sodium diet and physical activity guidelines. Requested patient call or message back so we can discuss her readings/obtain an update. She was having issues with her Withings cuff so I requested that she change the batteries. If that did not work, she may need to go to the [facility]. Will continue to monitor/follow up.

## 2017-09-26 DIAGNOSIS — I10 ESSENTIAL HYPERTENSION: ICD-10-CM

## 2017-09-26 RX ORDER — CHLORTHALIDONE 25 MG/1
25 TABLET ORAL EVERY MORNING
Qty: 30 TABLET | Refills: 11 | Status: SHIPPED | OUTPATIENT
Start: 2017-09-26 | End: 2018-01-29 | Stop reason: SDUPTHER

## 2017-09-26 NOTE — PROGRESS NOTES
Last 5 Patient Entered Redings Current 30 Day Average: 141/94     Recent Readings 9/20/2017 9/13/2017 9/11/2017 9/7/2017 9/7/2017    Systolic BP (mmHg) 149 138 139 148 149    Diastolic BP (mmHg) 96 85 92 95 94    Pulse 78 77 84 87 83        Hypertension Medications             chlorthalidone (HYGROTEN) 25 MG Tab Take 1 tablet (25 mg total) by mouth every morning. Stop HCTZ.        Plan:   Called patient to follow up. Per current 30 day average, BP is uncontrolled.   LVM, 2nd attempt, requested patient call back.  Will continue to monitor. WCB in 2 weeks.

## 2017-10-10 RX ORDER — AMLODIPINE BESYLATE 5 MG/1
5 TABLET ORAL NIGHTLY
Qty: 30 TABLET | Refills: 11 | Status: SHIPPED | OUTPATIENT
Start: 2017-10-10 | End: 2018-06-29 | Stop reason: SDUPTHER

## 2017-10-10 NOTE — PROGRESS NOTES
Last 5 Patient Entered Redings Current 30 Day Average: 142/91     Recent Readings 9/20/2017 9/13/2017 9/11/2017 9/7/2017 9/7/2017    Systolic BP (mmHg) 149 138 139 148 149    Diastolic BP (mmHg) 96 85 92 95 94    Pulse 78 77 84 87 83        Hypertension Medications             chlorthalidone (HYGROTEN) 25 MG Tab Take 1 tablet (25 mg total) by mouth every morning. Stop HCTZ.        Assessment/ Plan:   Called patient to follow up. Per current 30 day average, BP is slightly uncontrolled.   Will restart amlodipine 5mg qhs, patient confirms understanding. Requested patient take more frequent readings, ideally 3-5x/week, for the next 2 weeks, patient confirms understanding.   Patient denies having questions or concerns. Instructed patient to call if she ever has any questions or concerns, patient confirms understanding.   Will continue to monitor. WCB in 2 weeks.

## 2017-10-19 ENCOUNTER — PATIENT MESSAGE (OUTPATIENT)
Dept: ADMINISTRATIVE | Facility: OTHER | Age: 42
End: 2017-10-19

## 2017-10-19 NOTE — PROGRESS NOTES
Last 5 Patient Entered Redings Current 30 Day Average: 145/95     Recent Readings 10/10/2017 10/10/2017 10/3/2017 10/3/2017 9/20/2017    Systolic BP (mmHg) 144 143 142 157 149    Diastolic BP (mmHg) 95 97 93 100 96    Pulse 78 77 82 81 78          Hypertension Digital Medicine (HDMP) Health  Follow Up    LVM to follow up with Mrs. Adama Ortiz.    Per 30 day average, blood pressure is not well controlled 145/95 mmHg. Encouraged adherence to low sodium diet and physical activity guidelines. Requested patient call or message back so we can discuss her readings/obtain an update. Will continue to monitor/follow up.     Also reminded her to start taking her readings again. Her PharmD MICHAEL Krause requested that the patient take at least 2-3 BP readings a week for the next few weeks. I requested that the patient let me know if she is having any IT issues.

## 2017-10-24 ENCOUNTER — PATIENT OUTREACH (OUTPATIENT)
Dept: OTHER | Facility: OTHER | Age: 42
End: 2017-10-24

## 2017-10-24 NOTE — PROGRESS NOTES
Last 5 Patient Entered [place] Current 30 Day Average: 147/92     Recent Readings 10/22/2017 10/20/2017 10/10/2017 10/10/2017 10/3/2017    Systolic BP (mmHg) 150 150 144 143 142    Diastolic BP (mmHg) 90 90 95 97 93    Pulse 83 83 78 77 82        Hypertension Medications             amlodipine (NORVASC) 5 MG tablet Take 1 tablet (5 mg total) by mouth every evening.    chlorthalidone (HYGROTEN) 25 MG Tab Take 1 tablet (25 mg total) by mouth every morning. Stop HCTZ.        Plan:   Called patient to follow up since restarting amlodipine 5mg. Per current 30 day average, BP is not well controlled. Patient has not taken enough readings since restarting amlodipine to see whether or not she is responding as expected.   LVM, requested patient call back.  Will continue to monitor. WCB in 2 weeks.

## 2017-11-07 NOTE — PROGRESS NOTES
Last 5 Patient Entered Readings Current 30 Day Average: 146/93     Recent Readings 11/1/2017 11/1/2017 11/1/2017 10/30/2017 10/30/2017    Systolic BP (mmHg) 145 125 143 142 168    Diastolic BP (mmHg) 96 91 92 95 102    Pulse 76 75 75 74 72        Hypertension Medications             amlodipine (NORVASC) 5 MG tablet Take 1 tablet (5 mg total) by mouth every evening.    chlorthalidone (HYGROTEN) 25 MG Tab Take 1 tablet (25 mg total) by mouth every morning. Stop HCTZ.        Plan:   11/7- Called patient to follow up. Per current 30 day average, BP is not well controlled.   LVM, 2nd attempt, requested patient call back.  Will continue to monitor. WCB in 2 weeks.     11/21- Called patient to follow up. Per newly released 2017 ACC/ AHA HTN guidelines, current 30-day average is not well controlled.  Patient states she is unable to speak at this time.   Will continue to monitor. WCB in 2 weeks.     11/29-  Patient called back. Per newly released 2017 ACC/ AHA HTN guidelines (goal of BP < 130/80), current 30-day average is not well controlled.  Discussed new goals with patient.   Patient requests to be placed on hiatus, grieving death of grandfather.   Patient denies having questions or concerns. Instructed patient to call if she has any questions or concerns, patient confirms understanding.   Will place patient on hiatus through the new year. WCB in 1 month.

## 2017-12-15 ENCOUNTER — TELEPHONE (OUTPATIENT)
Dept: FAMILY MEDICINE | Facility: CLINIC | Age: 42
End: 2017-12-15

## 2017-12-15 ENCOUNTER — OFFICE VISIT (OUTPATIENT)
Dept: URGENT CARE | Facility: CLINIC | Age: 42
End: 2017-12-15
Payer: COMMERCIAL

## 2017-12-15 VITALS
WEIGHT: 253 LBS | OXYGEN SATURATION: 98 % | DIASTOLIC BLOOD PRESSURE: 98 MMHG | HEART RATE: 108 BPM | SYSTOLIC BLOOD PRESSURE: 158 MMHG | BODY MASS INDEX: 51 KG/M2 | TEMPERATURE: 100 F | HEIGHT: 59 IN

## 2017-12-15 DIAGNOSIS — R52 BODY ACHES: Primary | ICD-10-CM

## 2017-12-15 DIAGNOSIS — J10.1 INFLUENZA A: ICD-10-CM

## 2017-12-15 LAB
CTP QC/QA: YES
FLUAV AG NPH QL: POSITIVE
FLUBV AG NPH QL: NEGATIVE

## 2017-12-15 PROCEDURE — 87804 INFLUENZA ASSAY W/OPTIC: CPT | Mod: QW,S$GLB,, | Performed by: NURSE PRACTITIONER

## 2017-12-15 PROCEDURE — 99203 OFFICE O/P NEW LOW 30 MIN: CPT | Mod: 25,S$GLB,, | Performed by: NURSE PRACTITIONER

## 2017-12-15 PROCEDURE — 96372 THER/PROPH/DIAG INJ SC/IM: CPT | Mod: S$GLB,,,

## 2017-12-15 RX ORDER — BETAMETHASONE SODIUM PHOSPHATE AND BETAMETHASONE ACETATE 3; 3 MG/ML; MG/ML
6 INJECTION, SUSPENSION INTRA-ARTICULAR; INTRALESIONAL; INTRAMUSCULAR; SOFT TISSUE
Status: COMPLETED | OUTPATIENT
Start: 2017-12-15 | End: 2017-12-15

## 2017-12-15 RX ORDER — OSELTAMIVIR PHOSPHATE 75 MG/1
75 CAPSULE ORAL 2 TIMES DAILY
Qty: 20 CAPSULE | Refills: 0 | Status: SHIPPED | OUTPATIENT
Start: 2017-12-15 | End: 2017-12-25

## 2017-12-15 RX ADMIN — BETAMETHASONE SODIUM PHOSPHATE AND BETAMETHASONE ACETATE 6 MG: 3; 3 INJECTION, SUSPENSION INTRA-ARTICULAR; INTRALESIONAL; INTRAMUSCULAR; SOFT TISSUE at 06:12

## 2017-12-15 NOTE — TELEPHONE ENCOUNTER
----- Message from Padilla Lowery sent at 12/15/2017  3:29 PM CST -----  Contact: Mom-Madonna  Mom states pt has body aches and chills and has questions regarding medication that pt can take. Mom can be reached @ [phone].

## 2017-12-15 NOTE — PROGRESS NOTES
"Subjective:       Patient ID: Adama Ortiz is a 42 y.o. female.    Vitals:  height is 4' 11" (1.499 m) and weight is 114.8 kg (253 lb). Her temperature is 99.7 °F (37.6 °C). Her blood pressure is 158/98 (abnormal) and her pulse is 108. Her oxygen saturation is 98%.     Chief Complaint: URI    Pt has upper respiratory flu-like symptoms; low grade fever; cough; congestion; clear rhinitis; malaise;      URI    This is a new problem. The current episode started today. The problem has been gradually worsening. Maximum temperature: 99.8. Associated symptoms include congestion, coughing, ear pain, headaches, nausea, rhinorrhea, sneezing and a sore throat. Pertinent negatives include no abdominal pain, chest pain or wheezing. Treatments tried: otc cough.     Review of Systems   Constitution: Positive for decreased appetite, fever and weakness. Negative for chills and malaise/fatigue.   HENT: Positive for congestion, ear pain, rhinorrhea, sneezing and sore throat. Negative for ear discharge, hearing loss, hoarse voice and stridor.    Eyes: Negative for blurred vision, discharge, double vision, pain, photophobia and redness.   Cardiovascular: Negative for chest pain, dyspnea on exertion and leg swelling.   Respiratory: Positive for cough. Negative for hemoptysis, shortness of breath, sputum production and wheezing.    Endocrine: Negative for cold intolerance and heat intolerance.   Skin: Negative for color change, dry skin and nail changes.   Musculoskeletal: Positive for myalgias. Negative for arthritis, muscle cramps and muscle weakness.   Gastrointestinal: Positive for nausea. Negative for bloating and abdominal pain.   Genitourinary: Negative for bladder incontinence.   Neurological: Positive for headaches.   Psychiatric/Behavioral: Negative for altered mental status and depression.       Objective:      Physical Exam   Constitutional: She is oriented to person, place, and time. She appears well-developed and " well-nourished. She appears distressed.   HENT:   Head: Normocephalic and atraumatic.   Right Ear: External ear normal.   Left Ear: External ear normal.   Nose: Nose normal.   Mouth/Throat: Oropharyngeal exudate present.   Eyes: Conjunctivae and EOM are normal. Pupils are equal, round, and reactive to light. Right eye exhibits no discharge. Left eye exhibits no discharge.   Neck: Normal range of motion. Neck supple.   Cardiovascular: Normal rate, regular rhythm, normal heart sounds and intact distal pulses.  Exam reveals no gallop.    No murmur heard.  Pulmonary/Chest: Effort normal. No respiratory distress. She has no wheezes. She has rales. She exhibits tenderness.   Abdominal: Soft. Bowel sounds are normal.   Musculoskeletal: She exhibits no edema, tenderness or deformity.   Neurological: She is alert and oriented to person, place, and time. No cranial nerve deficit. Coordination normal.   Skin: Skin is warm. Capillary refill takes 2 to 3 seconds. She is diaphoretic.   Psychiatric: She has a normal mood and affect. Her behavior is normal. Thought content normal.       Assessment:       1. Body aches    2. Influenza A        Plan:         Body aches  -     POCT Influenza A/B  -     oseltamivir (TAMIFLU) 75 MG capsule; Take 1 capsule (75 mg total) by mouth 2 (two) times daily.  Dispense: 20 capsule; Refill: 0    Influenza A  -     oseltamivir (TAMIFLU) 75 MG capsule; Take 1 capsule (75 mg total) by mouth 2 (two) times daily.  Dispense: 20 capsule; Refill: 0           Post-Care Instructions: I reviewed with the patient in detail post-care instructions. Patient is to wear sunprotection, and avoid picking at any of the treated lesions. Pt may apply Vaseline to crusted or scabbing areas. Consent: The patient's consent was obtained including but not limited to risks of crusting, scabbing, blistering, scarring, darker or lighter pigmentary change, recurrence, incomplete removal and infection. Render Post-Care Instructions In Note?: no Duration Of Freeze Thaw-Cycle (Seconds): 0 Detail Level: Detailed

## 2017-12-16 NOTE — PATIENT INSTRUCTIONS

## 2017-12-21 ENCOUNTER — HOSPITAL ENCOUNTER (EMERGENCY)
Facility: HOSPITAL | Age: 42
Discharge: HOME | End: 2017-12-21
Attending: EMERGENCY MEDICINE
Payer: COMMERCIAL

## 2017-12-21 VITALS
BODY MASS INDEX: 47.84 KG/M2 | RESPIRATION RATE: 16 BRPM | TEMPERATURE: 98 F | WEIGHT: 260 LBS | HEIGHT: 62 IN | DIASTOLIC BLOOD PRESSURE: 81 MMHG | SYSTOLIC BLOOD PRESSURE: 154 MMHG | HEART RATE: 86 BPM | OXYGEN SATURATION: 99 %

## 2017-12-21 DIAGNOSIS — L50.9 URTICARIA: ICD-10-CM

## 2017-12-21 DIAGNOSIS — T78.40XA ALLERGIC REACTION, INITIAL ENCOUNTER: Primary | ICD-10-CM

## 2017-12-21 PROCEDURE — 25000003 PHARM REV CODE 250: Performed by: EMERGENCY MEDICINE

## 2017-12-21 PROCEDURE — 99283 EMERGENCY DEPT VISIT LOW MDM: CPT

## 2017-12-21 PROCEDURE — 63600175 PHARM REV CODE 636 W HCPCS: Performed by: EMERGENCY MEDICINE

## 2017-12-21 RX ORDER — PREDNISONE 20 MG/1
60 TABLET ORAL
Status: COMPLETED | OUTPATIENT
Start: 2017-12-21 | End: 2017-12-21

## 2017-12-21 RX ORDER — DIPHENHYDRAMINE HCL 25 MG
25 CAPSULE ORAL EVERY 6 HOURS PRN
Qty: 20 CAPSULE | Refills: 1 | Status: SHIPPED | OUTPATIENT
Start: 2017-12-21 | End: 2018-10-05

## 2017-12-21 RX ORDER — DIPHENHYDRAMINE HCL 25 MG
25 CAPSULE ORAL
Status: COMPLETED | OUTPATIENT
Start: 2017-12-21 | End: 2017-12-21

## 2017-12-21 RX ORDER — PREDNISONE 20 MG/1
40 TABLET ORAL DAILY
Qty: 10 TABLET | Refills: 0 | Status: SHIPPED | OUTPATIENT
Start: 2017-12-21 | End: 2017-12-26

## 2017-12-21 RX ADMIN — DIPHENHYDRAMINE HYDROCHLORIDE 25 MG: 25 CAPSULE ORAL at 07:12

## 2017-12-21 RX ADMIN — PREDNISONE 60 MG: 20 TABLET ORAL at 07:12

## 2017-12-21 NOTE — ED TRIAGE NOTES
Pt came to the ED today for hives that started around 4:30 this morning on her right arm and a few spots on the left hand. Pt denies any pain or hives at this time. States the itching and hives woke her up. Pt reports she had some hives over her chest and back around midnight, but they have gone away. Denies any new soaps, powders, or any other new materials in contact with her skin. Pt states she did use a dress for the first time last night and symptoms started after.

## 2017-12-21 NOTE — ED PROVIDER NOTES
Encounter Date: 12/21/2017    SCRIBE #1 NOTE: I, Capo Terry II, am scribing for, and in the presence of,  Carlos Marrero MD. I have scribed the following portions of the note - Other sections scribed: HPI and ROS.       History     Chief Complaint   Patient presents with    Allergic Reaction     possible allergic reaction that started around 4:30 am with burning and itching down right arm and left arm; ? bite lara; denies SOB;      CC: Allergic Reaction     HPI: This 42 y.o. female with HTN presents to the ED c/o acute onset, allergic reaction with rash and numbness x5 hours. Pt states at 4:30 am she woke up with an itching and burning sensation to her right hand and arm. Pt denies any change in washing detergents. Pt reports she recently overcame the flu and changed her bed sheets. Pt also reports she has multiple stuffed animals on her bed. No prior tx attempted. No alleviating or exacerbating factors. Pt also c/o left hand numbness.  Pt denies sore throat, chest pain, and diaphoresis.         The history is provided by the patient. No  was used.     Review of patient's allergies indicates:   Allergen Reactions    Aspirin Swelling    Keflex [cephalexin] Swelling and Rash     Past Medical History:   Diagnosis Date    Hypertension      Past Surgical History:   Procedure Laterality Date    APPENDECTOMY      BREAST SURGERY      DILATION AND CURETTAGE OF UTERUS       Family History   Problem Relation Age of Onset    Hypertension Mother     Hyperlipidemia Mother      Social History   Substance Use Topics    Smoking status: Never Smoker    Smokeless tobacco: Never Used    Alcohol use No      Comment: occ     Review of Systems   Constitutional: Negative for chills and fever.   HENT: Negative for congestion, ear pain, rhinorrhea and sore throat.    Eyes: Negative for pain and visual disturbance.   Respiratory: Negative for cough and shortness of breath.    Cardiovascular: Negative for  chest pain.   Gastrointestinal: Negative for abdominal pain, diarrhea, nausea and vomiting.   Genitourinary: Negative for dysuria.   Musculoskeletal: Negative for back pain and neck pain.   Skin: Positive for rash.   Neurological: Positive for numbness. Negative for headaches.       Physical Exam     Initial Vitals [12/21/17 0519]   BP Pulse Resp Temp SpO2   (!) 154/81 86 16 98.3 °F (36.8 °C) 99 %      MAP       105.33         Physical Exam  The patient was examined specifically for the following:   General:No significant distress, Good color, Warm and dry. Head and neck:Scalp atraumatic, Neck supple. Neurological:Appropriate conversation, Gross motor deficits. Eyes:Conjugate gaze, Clear corneas. ENT: No epistaxis. Cardiac: Regular rate and rhythm, Grossly normal heart tones. Pulmonary: Wheezing, Rales. Gastrointestinal: Abdominal tenderness, Abdominal distention. Musculoskeletal: Extremity deformity, Apparent pain with range of motion of the joints. Skin: Rash.   The findings on examination were normal except for the following: The patient has a vague urticarial skin rash on the right wrist on the volar aspect.  ED Course   Procedures  Labs Reviewed - No data to display      Medical decision making: Given the above, this patient presents to the emergency room with a rash on her arms that occurred while she was sleeping in bed.  The patient is not known to be ALLERGIC to anything.  There is no shortness of breath nausea vomiting diarrhea respiratory distress swelling of the face the tongue or oral pharynx.  Patient is not weak or dizzy.  I doubt anaphylaxis angioedema bronchospasm.  I believe the patient did have an urticarial ALLERGIC reaction.                  Scribe Attestation:   Scribe #1: I performed the above scribed service and the documentation accurately describes the services I performed. I attest to the accuracy of the note.    Attending Attestation:           Physician Attestation for Scribe:  Physician  Attestation Statement for Scribe #1: I, Carlos Marrero MD, reviewed documentation, as scribed by Capo Felton II in my presence, and it is both accurate and complete.                 ED Course      Clinical Impression:   The primary encounter diagnosis was Allergic reaction, initial encounter. A diagnosis of Urticaria was also pertinent to this visit.                           Carlos Marrero MD  12/23/17 2008

## 2017-12-21 NOTE — DISCHARGE INSTRUCTIONS
Try to identify and avoid the ALLERGIC substance.  Please return immediately if you get worse or if new problems develop.  Prednisone and Benadryl for rash and itching.

## 2018-01-04 ENCOUNTER — PATIENT OUTREACH (OUTPATIENT)
Dept: OTHER | Facility: OTHER | Age: 43
End: 2018-01-04

## 2018-01-04 NOTE — PROGRESS NOTES
Last 5 Patient Entered Readings Current 30 Day Average:      Recent Readings 11/1/2017 11/1/2017 11/1/2017 10/30/2017 10/30/2017    SBP (mmHg) 145 125 143 142 168    DBP (mmHg) 96 91 92 95 102    Pulse 76 75 75 74 72        Hypertension Medications             amlodipine (NORVASC) 5 MG tablet Take 1 tablet (5 mg total) by mouth every evening.    chlorthalidone (HYGROTEN) 25 MG Tab Take 1 tablet (25 mg total) by mouth every morning. Stop HCTZ.        Assessment/ Plan:   Called patient to follow up, patient states she is ready to come out of hiatus, plans to resume readings on 1/8.   Requested patient take more frequent readings so I can better determine whether or not we need to adjust she HTN medication regimen, patient confirms understanding.   Patient denies having questions or concerns. Instructed patient to call if she has any questions or concerns, patient confirms understanding.   Will continue to monitor. WCB in 1 month.

## 2018-01-25 ENCOUNTER — PATIENT OUTREACH (OUTPATIENT)
Dept: OTHER | Facility: OTHER | Age: 43
End: 2018-01-25

## 2018-01-25 NOTE — PROGRESS NOTES
Last 5 Patient Entered Readings                                      Current 30 Day Average: 151/106     Recent Readings 1/24/2018 1/23/2018 1/22/2018 1/22/2018 1/17/2018    SBP (mmHg) 152 143 156 150 140    DBP (mmHg) 121 99 99 101 102    Pulse 79 81 78 74 85          Hypertension Digital Medicine Program (HDMP): Health  Follow Up    Lifestyle Modifications:    1.Low sodium diet: In progress : Patient has just started working on maintaining a low sodium diet. She admits that when her  cooks, that he cooks with way too much salt. She is working with him on this. She finally got her oven back since remodeling their home so she is starting to cook more at her house. She has not eaten any fast food at all this week and will continue to cut back on that. Patient and I spoke extensively about sodium intake, water intake and the guidelines for sodium consumption. Patient asked a lot of good questions and I provided with her updated resources to get her back on track. She will reach out to me with specific questions or concerns.     2.Physical activity: Deferred    3.Hypotension/Hypertension symptoms: no   Frequency/Alleviating factors/Precipitating factors, etc.     4.Patient has not been compliant with the medication regimen. Patient struggles with remembering to take her BP medication. She also does not take her medication at the same time every day because it hurts her stomach if she takes it without eating and sometimes she skips breakfast. I suggested a pill container and putting reminders in her phone. I explained how important it is for her to take her BP medication everyday at the same time everyday. She is going to work on this.     Patient does not think her cuff is working properly. She has the [employer] cuff and does not think it fits well. I suggested she bring it to the [facility] and maybe swap it out for an [employer] cuff. I did advise her that she may have to pay for the new cuff.     Follow up with  Hoang Adama Ortiz completed. No further questions or concerns. I will follow up in a few weeks to assess progress.

## 2018-01-29 DIAGNOSIS — I10 ESSENTIAL HYPERTENSION: ICD-10-CM

## 2018-01-29 RX ORDER — CHLORTHALIDONE 25 MG/1
25 TABLET ORAL EVERY MORNING
Qty: 30 TABLET | Refills: 11 | Status: SHIPPED | OUTPATIENT
Start: 2018-01-29 | End: 2018-11-14

## 2018-02-05 ENCOUNTER — PATIENT OUTREACH (OUTPATIENT)
Dept: OTHER | Facility: OTHER | Age: 43
End: 2018-02-05

## 2018-02-05 NOTE — PROGRESS NOTES
Last 5 Patient Entered Readings                                      Current 30 Day Average: 148/103     Recent Readings 1/30/2018 1/29/2018 1/24/2018 1/23/2018 1/22/2018    SBP (mmHg) 145 139 152 143 156    DBP (mmHg) 97 96 121 99 99    Pulse 83 84 79 81 78        Hypertension Medications             amlodipine (NORVASC) 5 MG tablet Take 1 tablet (5 mg total) by mouth every evening.    chlorthalidone (HYGROTEN) 25 MG Tab Take 1 tablet (25 mg total) by mouth every morning. Stop HCTZ.        Plan:   Called patient to follow up. Per newly released 2017 ACC/ AHA HTN guidelines  (goal of BP < 130/80), current 30-day average is uncontrolled. Would like to discuss increasing amlodipine to 10 and adding valsartan.   LVM, requested patient call back at her convenience.  Will continue to monitor. WCB in 2 weeks.

## 2018-02-19 NOTE — PROGRESS NOTES
Last 5 Patient Entered Readings                                      Current 30 Day Average: 146/103     Recent Readings 2/9/2018 2/7/2018 2/7/2018 1/30/2018 1/29/2018    SBP (mmHg) 139 148 149 145 139    DBP (mmHg) 108 102 106 97 96    Pulse 90 84 97 83 84        Hypertension Medications             amlodipine (NORVASC) 5 MG tablet Take 1 tablet (5 mg total) by mouth every evening.    chlorthalidone (HYGROTEN) 25 MG Tab Take 1 tablet (25 mg total) by mouth every morning. Stop HCTZ.           Plan:   Called patient to follow up. Per newly released 2017 ACC/ AHA HTN guidelines  (goal of BP < 130/80), current 30-day average is uncontrolled. Would like to discuss increasing amlodipine to 10 and adding valsartan.   LVM, 2nd attempt,  requested patient call back at her convenience.  Will continue to monitor. WCB in 2 weeks.

## 2018-02-21 ENCOUNTER — PATIENT OUTREACH (OUTPATIENT)
Dept: OTHER | Facility: OTHER | Age: 43
End: 2018-02-21

## 2018-02-21 NOTE — PROGRESS NOTES
Last 5 Patient Entered Readings                                      Current 30 Day Average: 142/100     Recent Readings 2/21/2018 2/20/2018 2/19/2018 2/9/2018 2/7/2018    SBP (mmHg) 138 141 122 139 148    DBP (mmHg) 96 98 88 108 102    Pulse 89 79 87 90 84          Hypertension Digital Medicine Program (HDMP): Health  Follow Up    Lifestyle Modifications:    1.Low sodium diet: In progress : Patient has made some good changes in her diet which is helping with her sodium intake. She and her  are cooking with little to no sodium. She is struggling with this a little and sometimes has to add some salt at the end but she plans to add less and less as time goes on. She is incorporating Hoang Eric seasoning as well and we also spoke about no salt substitutes like Nu-salt, etc. She plans on making more small changes as time goes on to help get her sodium content even more under control.     2.Physical activity: In progress : Patients co-workers are starting an after work walking program and are putting together a schedule now. She plans to join them in the walking group in order to increase her physical activity.     3.Hypotension/Hypertension symptoms: no   Frequency/Alleviating factors/Precipitating factors, etc.     4.Patient has been more compliant with the medication regimen. She stated that she has been much better with taking her BP medication during the work week however she struggles a little on the weekends. Her schedule is different on the weekends so she is not always up in time to take her medication at the same time that she takes it during the week. She knows this is something that she will just have to continue working on. I provided her with some other ideas as well.    She has also increased her water intake up to 1.5 bottles of water. She will continue to slowly increase this.     Follow up with Mrs. Adama Ortiz completed. No further questions or concerns. I will follow up in a few weeks to  assess progress.

## 2018-02-21 NOTE — PROGRESS NOTES
Last 5 Patient Entered Readings                                      Current 30 Day Average: 143/101     Recent Readings 2/20/2018 2/19/2018 2/9/2018 2/7/2018 2/7/2018    SBP (mmHg) 141 122 139 148 149    DBP (mmHg) 98 88 108 102 106    Pulse 79 87 90 84 97          Hypertension Digital Medicine (HDMP) Health  Follow Up    LVM to follow up with Mrs. Adama Ortiz.    Per 30 day average, blood pressure is not well controlled 143/101 mmHg. Encouraged adherence to low sodium diet and physical activity guidelines. Requested patient call or message back so we can discuss her readings/obtain an update. Will continue to monitor/follow up.

## 2018-03-06 NOTE — PROGRESS NOTES
Last 5 Patient Entered Readings                                      Current 30 Day Average: 138/98     Recent Readings 3/5/2018 3/5/2018 2/21/2018 2/20/2018 2/19/2018    SBP (mmHg) 138 154 138 141 122    DBP (mmHg) 97 101 96 98 88    Pulse 71 72 89 79 87        Hypertension Medications             amlodipine (NORVASC) 5 MG tablet Take 1 tablet (5 mg total) by mouth every evening.    chlorthalidone (HYGROTEN) 25 MG Tab Take 1 tablet (25 mg total) by mouth every morning. Stop HCTZ.        Plan:   Called patient to follow up. Per newly released 2017 ACC/ AHA HTN guidelines  (goal of BP < 130/80), current 30-day average is uncontrolled. Would like to discuss increasing amlodipine to 10 and adding valsartan.   LVM, 3rd attempt, requested patient call back at her convenience.  Will continue to monitor. WCB in 2 weeks.

## 2018-03-16 ENCOUNTER — PATIENT OUTREACH (OUTPATIENT)
Dept: OTHER | Facility: OTHER | Age: 43
End: 2018-03-16

## 2018-03-16 NOTE — PROGRESS NOTES
"Last 5 Patient Entered Readings                                      Current 30 Day Average: 136/95     Recent Readings 3/14/2018 3/7/2018 3/5/2018 3/5/2018 2/21/2018    SBP (mmHg) 142.333 136 138 154 138    DBP (mmHg) 93.666 96 97 101 96    Pulse 78 85 71 72 89          Hypertension Digital Medicine Program (HDMP): Health  Follow Up    Lifestyle Modifications:    1.Low sodium diet: yes : Patient has made some changes in her diet which is helping her BP. She still has some work to do but she it happy with her progress. So far, she has utilized Mrs. Reyes and fresh herbs and spices instead of just salt. She admits to still adding a "small" amount of salt when cooking certain foods that she just cannot eat without it but she also found some foods that she thought "needed" salt to taste good, taste good without it. She also mentioned that she started cooking pineda hens in a wine base and without extra salt so that has helped her cut back a good bit there. I suggested that she start keeping a food log so we can pinpoint other areas where she may be getting too much sodium and she agreed.     2.Physical activity: yes : Patient has started walking with her co-workers after work a few days a week.     3.Hypotension/Hypertension symptoms: no   Frequency/Alleviating factors/Precipitating factors, etc.     4.Patient has been compliant with the medication regimen.     Follow up with Mrs. Adama Ortiz completed. No further questions or concerns. I will follow up in a few weeks to assess progress.     "

## 2018-03-21 NOTE — PROGRESS NOTES
Last 5 Patient Entered Readings                                      Current 30 Day Average: 136/95     Recent Readings 3/14/2018 3/7/2018 3/5/2018 3/5/2018 2/21/2018    SBP (mmHg) 142.333 136 138 154 138    DBP (mmHg) 93.666 96 97 101 96    Pulse 78 85 71 72 89        Hypertension Medications             amlodipine (NORVASC) 5 MG tablet Take 1 tablet (5 mg total) by mouth every evening.    chlorthalidone (HYGROTEN) 25 MG Tab Take 1 tablet (25 mg total) by mouth every morning. Stop HCTZ.        Plan:   Called patient to follow up. Per newly released 2017 ACC/ AHA HTN guidelines  (goal of BP < 130/80), current 30-day average is uncontrolled. Would like to discuss increasing amlodipine to 10 and adding valsartan.   LVM, 4th attempt, requested patient call back at her convenience.  Will continue to monitor. WCB in 2 weeks.

## 2018-03-23 NOTE — PROGRESS NOTES
Last 5 Patient Entered Readings                                      Current 30 Day Average: 139/96     Recent Readings 3/14/2018 3/7/2018 3/5/2018 3/5/2018 2/21/2018    SBP (mmHg) 142.333 136 138 154 138    DBP (mmHg) 93.666 96 97 101 96    Pulse 78 85 71 72 89        Hypertension Medications             amlodipine (NORVASC) 5 MG tablet Take 1 tablet (5 mg total) by mouth every evening.    chlorthalidone (HYGROTEN) 25 MG Tab Take 1 tablet (25 mg total) by mouth every morning. Stop HCTZ.        Assessment/ Plan:   Patient called back. Patient states she realizes she has not been taking amlodipine for a couple of months. Instructed patient to resume amlodipine. Requested patient resume taking frequent readings, ideally 2-3x/ week.   Per newly released 2017 ACC/ AHA HTN guidelines (goal of BP < 130/80), current 30-day average is uncontrolled.    Patient denies having questions or concerns. Instructed patient to call if she has any questions or concerns, patient confirms understanding.   Will continue to monitor. WCB in 3 weeks.

## 2018-04-09 ENCOUNTER — LAB VISIT (OUTPATIENT)
Dept: LAB | Facility: HOSPITAL | Age: 43
End: 2018-04-09
Attending: FAMILY MEDICINE
Payer: COMMERCIAL

## 2018-04-09 ENCOUNTER — OFFICE VISIT (OUTPATIENT)
Dept: FAMILY MEDICINE | Facility: CLINIC | Age: 43
End: 2018-04-09
Payer: COMMERCIAL

## 2018-04-09 VITALS
OXYGEN SATURATION: 97 % | DIASTOLIC BLOOD PRESSURE: 90 MMHG | HEART RATE: 90 BPM | TEMPERATURE: 98 F | WEIGHT: 262.38 LBS | HEIGHT: 62 IN | BODY MASS INDEX: 48.28 KG/M2 | SYSTOLIC BLOOD PRESSURE: 150 MMHG

## 2018-04-09 DIAGNOSIS — Z00.00 ANNUAL PHYSICAL EXAM: Primary | ICD-10-CM

## 2018-04-09 DIAGNOSIS — M79.10 MYALGIA: ICD-10-CM

## 2018-04-09 DIAGNOSIS — Z00.00 ANNUAL PHYSICAL EXAM: ICD-10-CM

## 2018-04-09 DIAGNOSIS — Z12.31 ENCOUNTER FOR MAMMOGRAM TO ESTABLISH BASELINE MAMMOGRAM: ICD-10-CM

## 2018-04-09 LAB
ALBUMIN SERPL BCP-MCNC: 3.5 G/DL
ALP SERPL-CCNC: 53 U/L
ALT SERPL W/O P-5'-P-CCNC: 17 U/L
ANION GAP SERPL CALC-SCNC: 5 MMOL/L
AST SERPL-CCNC: 27 U/L
BASOPHILS # BLD AUTO: 0.02 K/UL
BASOPHILS NFR BLD: 0.3 %
BILIRUB SERPL-MCNC: 0.8 MG/DL
BUN SERPL-MCNC: 11 MG/DL
CALCIUM SERPL-MCNC: 9.1 MG/DL
CHLORIDE SERPL-SCNC: 106 MMOL/L
CHOLEST SERPL-MCNC: 182 MG/DL
CHOLEST/HDLC SERPL: 3.5 {RATIO}
CO2 SERPL-SCNC: 26 MMOL/L
CREAT SERPL-MCNC: 0.9 MG/DL
DIFFERENTIAL METHOD: NORMAL
EOSINOPHIL # BLD AUTO: 0.2 K/UL
EOSINOPHIL NFR BLD: 2.2 %
ERYTHROCYTE [DISTWIDTH] IN BLOOD BY AUTOMATED COUNT: 13.2 %
EST. GFR  (AFRICAN AMERICAN): >60 ML/MIN/1.73 M^2
EST. GFR  (NON AFRICAN AMERICAN): >60 ML/MIN/1.73 M^2
GLUCOSE SERPL-MCNC: 84 MG/DL
HCT VFR BLD AUTO: 41.3 %
HDLC SERPL-MCNC: 52 MG/DL
HDLC SERPL: 28.6 %
HGB BLD-MCNC: 14 G/DL
LDLC SERPL CALC-MCNC: 110.6 MG/DL
LYMPHOCYTES # BLD AUTO: 3.1 K/UL
LYMPHOCYTES NFR BLD: 39 %
MCH RBC QN AUTO: 29.9 PG
MCHC RBC AUTO-ENTMCNC: 33.9 G/DL
MCV RBC AUTO: 88 FL
MONOCYTES # BLD AUTO: 0.6 K/UL
MONOCYTES NFR BLD: 7.6 %
NEUTROPHILS # BLD AUTO: 4 K/UL
NEUTROPHILS NFR BLD: 50.9 %
NONHDLC SERPL-MCNC: 130 MG/DL
PLATELET # BLD AUTO: 281 K/UL
PMV BLD AUTO: 10.1 FL
POTASSIUM SERPL-SCNC: 4.3 MMOL/L
PROT SERPL-MCNC: 7.3 G/DL
RBC # BLD AUTO: 4.69 M/UL
SODIUM SERPL-SCNC: 137 MMOL/L
TRIGL SERPL-MCNC: 97 MG/DL
TSH SERPL DL<=0.005 MIU/L-ACNC: 2.16 UIU/ML
URATE SERPL-MCNC: 4.4 MG/DL
WBC # BLD AUTO: 7.85 K/UL

## 2018-04-09 PROCEDURE — 84550 ASSAY OF BLOOD/URIC ACID: CPT

## 2018-04-09 PROCEDURE — 3077F SYST BP >= 140 MM HG: CPT | Mod: CPTII,S$GLB,, | Performed by: FAMILY MEDICINE

## 2018-04-09 PROCEDURE — 83036 HEMOGLOBIN GLYCOSYLATED A1C: CPT

## 2018-04-09 PROCEDURE — 99999 PR PBB SHADOW E&M-EST. PATIENT-LVL III: CPT | Mod: PBBFAC,,, | Performed by: FAMILY MEDICINE

## 2018-04-09 PROCEDURE — 99396 PREV VISIT EST AGE 40-64: CPT | Mod: S$GLB,,, | Performed by: FAMILY MEDICINE

## 2018-04-09 PROCEDURE — 3080F DIAST BP >= 90 MM HG: CPT | Mod: CPTII,S$GLB,, | Performed by: FAMILY MEDICINE

## 2018-04-09 PROCEDURE — 80053 COMPREHEN METABOLIC PANEL: CPT

## 2018-04-09 PROCEDURE — 36415 COLL VENOUS BLD VENIPUNCTURE: CPT | Mod: PO

## 2018-04-09 PROCEDURE — 80061 LIPID PANEL: CPT

## 2018-04-09 PROCEDURE — 84443 ASSAY THYROID STIM HORMONE: CPT

## 2018-04-09 PROCEDURE — 85025 COMPLETE CBC W/AUTO DIFF WBC: CPT

## 2018-04-09 RX ORDER — KETOROLAC TROMETHAMINE 10 MG/1
10 TABLET, FILM COATED ORAL EVERY 6 HOURS
Qty: 15 TABLET | Refills: 0 | Status: SHIPPED | OUTPATIENT
Start: 2018-04-09 | End: 2018-10-05

## 2018-04-09 NOTE — PROGRESS NOTES
"Chief Complaint   Patient presents with    Arm Pain    Leg Pain     SUBJECTIVE:   42 y.o. female for annual checkup.  Current Outpatient Prescriptions   Medication Sig Dispense Refill    amitriptyline (ELAVIL) 10 MG tablet Take 1 tablet (10 mg total) by mouth nightly as needed for Insomnia. 30 tablet 0    amlodipine (NORVASC) 5 MG tablet Take 1 tablet (5 mg total) by mouth every evening. 30 tablet 11    chlorthalidone (HYGROTEN) 25 MG Tab Take 1 tablet (25 mg total) by mouth every morning. Stop HCTZ. 30 tablet 11    diphenhydrAMINE (BENADRYL) 25 mg capsule Take 1 each (25 mg total) by mouth every 6 (six) hours as needed for Itching or Allergies (Rash and itching, May use up to 2 tablets every 6 hours). 20 capsule 1    diclofenac sodium (VOLTAREN) 1 % Gel Apply 2 g topically 4 (four) times daily. 100 g 1     No current facility-administered medications for this visit.      Allergies: Aspirin and Keflex [cephalexin]   Patient's last menstrual period was 03/16/2018.    ROS:  Feeling well. No dyspnea or chest pain on exertion.  No abdominal pain, change in bowel habits, black or bloody stools.  No urinary tract symptoms. GYN ROS: normal menses, no abnormal bleeding, pelvic pain or discharge, no breast pain or new or enlarging lumps on self exam. No neurological complaints.    OBJECTIVE:   The patient appears well, alert, oriented x 3, in no distress.  BP (!) 150/90   Pulse 90   Temp 97.8 °F (36.6 °C) (Oral)   Ht 5' 2" (1.575 m)   Wt 119 kg (262 lb 5.6 oz)   LMP 03/16/2018   SpO2 97%   BMI 47.98 kg/m²   ENT normal.  Neck supple. No adenopathy or thyromegaly. LORENZO. Lungs are clear, good air entry, no wheezes, rhonchi or rales. S1 and S2 normal, no murmurs, regular rate and rhythm. Abdomen soft without tenderness, guarding, mass or organomegaly. Extremities show no edema, normal peripheral pulses. Neurological is normal, no focal findings.  Left strain of soleus muscle    BREAST EXAM: deferred    PELVIC " EXAM: deferred    ASSESSMENT:   1. Annual physical exam    2. Encounter for mammogram to establish baseline mammogram    3. Myalgia          PLAN:   Adama was seen today for arm pain and leg pain.    Diagnoses and all orders for this visit:    Annual physical exam  -     CBC auto differential; Standing  -     Comprehensive metabolic panel; Standing  -     Hemoglobin A1c; Standing  -     Lipid panel; Standing  -     TSH; Standing  -     Uric acid; Future  -     Urinalysis; Standing    Encounter for mammogram to establish baseline mammogram  -     Mammo Digital Screening Bilat with CAD; Future    Myalgia  -     ketorolac (TORADOL) 10 mg tablet; Take 1 tablet (10 mg total) by mouth every 6 (six) hours.    Other orders  -     Cancel: Lipid panel; Future      Counseled on age appropriate medical preventative services, including age appropriate cancer screenings, over all nutritional health, need for a consistent exercise regimen and an over all push towards maintaining a vigorous and active lifestyle.  Counseled on age appropriate vaccines and discussed upcoming health care needs based on age/gender.  Spent time with patient counseling on need for a good patient/doctor relationship moving forward.  Discussed use of common OTC medications and supplements.  Discussed common dietary aids and use of caffeine and the need for good sleep hygiene and stress management.

## 2018-04-10 LAB
ESTIMATED AVG GLUCOSE: 108 MG/DL
HBA1C MFR BLD HPLC: 5.4 %

## 2018-04-13 ENCOUNTER — PATIENT OUTREACH (OUTPATIENT)
Dept: OTHER | Facility: OTHER | Age: 43
End: 2018-04-13

## 2018-04-13 NOTE — PROGRESS NOTES
Last 5 Patient Entered Readings                                      Current 30 Day Average: 141/94     Recent Readings 3/29/2018 3/27/2018 3/27/2018 3/14/2018 3/7/2018    SBP (mmHg) 137 143 150 142.333 136    DBP (mmHg) 98 91 96 93.666 96    Pulse 93 75 76 78 85        Hypertension Medications             amlodipine (NORVASC) 5 MG tablet Take 1 tablet (5 mg total) by mouth every evening.    chlorthalidone (HYGROTEN) 25 MG Tab Take 1 tablet (25 mg total) by mouth every morning. Stop HCTZ.        Plan:   Called patient to follow up since resuming amlodipine. Per newly released 2017 ACC/ AHA HTN guidelines  (goal of BP < 130/80), current 30-day average is uncontrolled.  Patient has only taken 3 readings since we spoke 3 weeks ago.   LVM, requested patient call back at her convenience, and to continue to take at least weekly BP readings.   Will continue to monitor. WCB in 2 weeks.

## 2018-04-24 ENCOUNTER — PATIENT OUTREACH (OUTPATIENT)
Dept: OTHER | Facility: OTHER | Age: 43
End: 2018-04-24

## 2018-04-24 NOTE — PROGRESS NOTES
Last 5 Patient Entered Readings                                      Current 30 Day Average: 138/94     Recent Readings 4/19/2018 4/14/2018 3/29/2018 3/27/2018 3/27/2018    SBP (mmHg) 140 130 137 143 150    DBP (mmHg) 96 89 98 91 96    Pulse 91 92 93 75 76          Hypertension Digital Medicine Program (HDMP): Health  Follow Up    Lifestyle Modifications:    1.Low sodium diet: Patient continues working on her sodium intake. She admits to falling off a little and just getting back on track. She still struggles when it comes to going out to eat because it is something her and her  do often since they do not have children and do not always want to cook for just the two of them. I discuss the [employer] resource and she was very interested in this. I will message her over the link.     2.Physical activity: Deferred     3.Hypotension/Hypertension symptoms: no   Frequency/Alleviating factors/Precipitating factors, etc.     4.Patient has been compliant with the medication regimen.     Requested that she get a few more readings in this week so that her PharmD MICHAEL Krause has more readings to review for the next time she reaches out.     Follow up with Mrs. Adama Ortiz completed. No further questions or concerns. I will follow up in a few weeks to assess progress.

## 2018-04-27 NOTE — PROGRESS NOTES
Last 5 Patient Entered Readings                                      Current 30 Day Average: 138/93     Recent Readings 4/26/2018 4/19/2018 4/14/2018 3/29/2018 3/27/2018    SBP (mmHg) 143 140 130 137 143    DBP (mmHg) 89 96 89 98 91    Pulse 91 91 92 93 75        Hypertension Medications             amlodipine (NORVASC) 5 MG tablet Take 1 tablet (5 mg total) by mouth every evening.    chlorthalidone (HYGROTEN) 25 MG Tab Take 1 tablet (25 mg total) by mouth every morning. Stop HCTZ.        Assessment/ Plan:   Called patient to follow up.   Per newly released 2017 ACC/ AHA HTN guidelines (goal of BP < 130/80), current 30-day average is uncontrolled.    Requested patient take more frequent readings so I can better determine whether or not we need to adjust her HTN medication regimen, patient confirms understanding.   Patient expresses frustration with using her [employer] cuff, recommended purchasing an [employer] cuff.   Patient admits to occasional medication non-compliance with evening amlodipine. Recommended patient take amlodipine in the morning to avoid non-compliance.   Patient denies having questions or concerns. Instructed patient to call if she has any questions or concerns, patient confirms understanding.   Will continue to monitor. WCB in 1 month and if BP remains elevated, will discuss increasing amlodipine to 10 or adding an ACE/ ARB.

## 2018-05-14 ENCOUNTER — HOSPITAL ENCOUNTER (OUTPATIENT)
Dept: RADIOLOGY | Facility: HOSPITAL | Age: 43
Discharge: HOME | End: 2018-05-14
Attending: FAMILY MEDICINE
Payer: COMMERCIAL

## 2018-05-14 DIAGNOSIS — Z12.31 ENCOUNTER FOR MAMMOGRAM TO ESTABLISH BASELINE MAMMOGRAM: ICD-10-CM

## 2018-05-14 PROCEDURE — 77067 SCR MAMMO BI INCL CAD: CPT | Mod: 26,,, | Performed by: RADIOLOGY

## 2018-05-14 PROCEDURE — 77063 BREAST TOMOSYNTHESIS BI: CPT | Mod: 26,,, | Performed by: RADIOLOGY

## 2018-05-14 PROCEDURE — 77067 SCR MAMMO BI INCL CAD: CPT | Mod: TC

## 2018-05-22 ENCOUNTER — PATIENT OUTREACH (OUTPATIENT)
Dept: OTHER | Facility: OTHER | Age: 43
End: 2018-05-22

## 2018-05-22 NOTE — PROGRESS NOTES
Last 5 Patient Entered Readings                                      Current 30 Day Average: 137/91     Recent Readings 5/17/2018 5/15/2018 5/1/2018 4/30/2018 4/30/2018    SBP (mmHg) 134 142 135 132 124    DBP (mmHg) 94 93 93 85 88    Pulse 82 88 96 88 89            Digital Medicine: Health  Follow Up    Left voicemail to follow up with Hoang Adama Diana.  Current BP average 137/91 mmHg is not at goal, <130/80.

## 2018-05-25 ENCOUNTER — PATIENT OUTREACH (OUTPATIENT)
Dept: OTHER | Facility: OTHER | Age: 43
End: 2018-05-25

## 2018-05-25 DIAGNOSIS — I10 ESSENTIAL HYPERTENSION: ICD-10-CM

## 2018-05-25 NOTE — PROGRESS NOTES
Last 5 Patient Entered Readings                                      Current 30 Day Average: 137/91     Recent Readings 5/17/2018 5/15/2018 5/1/2018 4/30/2018 4/30/2018    SBP (mmHg) 134 142 135 132 124    DBP (mmHg) 94 93 93 85 88    Pulse 82 88 96 88 89        Hypertension Medications             amlodipine (NORVASC) 5 MG tablet Take 1 tablet (5 mg total) by mouth every evening.    chlorthalidone (HYGROTEN) 25 MG Tab Take 1 tablet (25 mg total) by mouth every morning. Stop HCTZ.        Plan:   Called patient to follow up regarding purchasing an [employer] cuff and changing amlodipine timing to qam.   Per newly released 2017 ACC/ AHA HTN guidelines  (goal of BP < 130/80), current 30-day average is uncontrolled.  LVM, requested patient call back at her convenience.  Will continue to monitor. WCB in 2 weeks.

## 2018-06-08 NOTE — PROGRESS NOTES
Last 5 Patient Entered Readings                                      Current 30 Day Average: 136/93     Recent Readings 6/5/2018 6/4/2018 5/17/2018 5/15/2018 5/1/2018    SBP (mmHg) 129 139 134 142 135    DBP (mmHg) 90 95 94 93 93    Pulse 82 91 82 88 96        Hypertension Medications             amlodipine (NORVASC) 5 MG tablet Take 1 tablet (5 mg total) by mouth every evening.    chlorthalidone (HYGROTEN) 25 MG Tab Take 1 tablet (25 mg total) by mouth every morning. Stop HCTZ.        Plan:   Called patient to follow up regarding purchasing an [employer] cuff and changing amlodipine timing to qam.   Per newly released 2017 ACC/ AHA HTN guidelines  (goal of BP < 130/80), current 30-day average is uncontrolled.  LVM, 2nd attempt, requested patient call back at her convenience.  Will continue to monitor. WCB in 3 weeks.

## 2018-06-12 NOTE — PROGRESS NOTES
Last 5 Patient Entered Readings                                      Current 30 Day Average: 136/93     Recent Readings 6/12/2018 6/11/2018 6/5/2018 6/4/2018 5/17/2018    SBP (mmHg) 137 134 129 139 134    DBP (mmHg) 93 92 90 95 94    Pulse 82 83 82 91 82          Digital Medicine: Health  Follow Up    Lifestyle Modifications:    1.Dietary Modifications (Sodium intake <2,000mg/day, food labels, dining out): Patient admits to dietary indiscretions lately. She went to a few family events and ate what they had and also increased the amount of times she has been eating fast food or food her  picked up from somewhere. 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 It is very hard for her because she works a full time job as well so she has not had time to cook at home and her kitchen has been taken over by cakes. She is also not getting a lot of sleep because of this (she averages 4 hours of sleep on the weekends because of cake baking) so she has recently realized that she may have to give up doing cakes. She knows she needs to do better with taking care of herself and her health. I suggest low sodium crockpot recipes and she requested I send her some along with low sodium instant pot recipes (she just got one and is excited to use it). I will email her this information.     2.Physical Activity: Deferred    3.Medication Therapy: Patient has been compliant with the medication regimen. Patient has been doing well since she started taking both of her BP medication together in the morning. She has not missed any medications recently.     4.Patient has the following medication side effects/concerns:   (Frequency/Alleviating factors/Precipitating factors, etc.)     Follow up with Mrs. Adama Ortiz completed. No further questions or concerns. Will continue follow up to achieve health goals.

## 2018-06-29 RX ORDER — AMLODIPINE BESYLATE 10 MG/1
10 TABLET ORAL EVERY MORNING
Qty: 30 TABLET | Refills: 11 | Status: SHIPPED | OUTPATIENT
Start: 2018-06-29 | End: 2018-11-14

## 2018-06-29 NOTE — PROGRESS NOTES
Last 5 Patient Entered Readings                                      Current 30 Day Average: 133/92     Recent Readings 6/28/2018 6/28/2018 6/21/2018 6/19/2018 6/18/2018    SBP (mmHg) 139 127 122 138 127    DBP (mmHg) 91 92 91 98 88    Pulse 82 83 79 83 85        Hypertension Medications             amlodipine (NORVASC) 5 MG tablet Take 1 tablet (5 mg total) by mouth every evening.    chlorthalidone (HYGROTEN) 25 MG Tab Take 1 tablet (25 mg total) by mouth every morning. Stop HCTZ.        Plan:   Called patient to follow up regarding purchasing an [employer] cuff and changing amlodipine timing to qam. Patient states she has not gone to the [facility] regarding her cuff. Patient confirms taking amlodipine in the morning has helped with medication compliance.   Per newly released 2017 ACC/ AHA HTN guidelines (goal of BP < 130/80), current 30-day average is uncontrolled.    Discussed with and instructed patient to increase amlodipine to 10mg qam, patient confirms understanding.   Patient denies having questions or concerns. Instructed patient to call if she has any questions or concerns, patient confirms understanding.   Will continue to monitor. WCB in 2 weeks.

## 2018-07-05 ENCOUNTER — PATIENT OUTREACH (OUTPATIENT)
Dept: OTHER | Facility: OTHER | Age: 43
End: 2018-07-05

## 2018-07-05 NOTE — PROGRESS NOTES
Last 5 Patient Entered Readings                                      Current 30 Day Average: 132/91     Recent Readings 7/3/2018 7/2/2018 6/28/2018 6/28/2018 6/21/2018    SBP (mmHg) 125 134 139 127 122    DBP (mmHg) 88 82 91 92 91    Pulse 85 87 82 83 79            Digital Medicine: Health  Follow Up    Left voicemail to follow up with Mrs. Adama Ortiz.  Current BP average 132/91 mmHg is not at goal, <130/80.  Want to see if she was able to go by the [facility] to get her cuff looked out/swapped out.

## 2018-07-13 ENCOUNTER — PATIENT OUTREACH (OUTPATIENT)
Dept: OTHER | Facility: OTHER | Age: 43
End: 2018-07-13

## 2018-07-13 NOTE — PROGRESS NOTES
Last 5 Patient Entered Readings                                      Current 30 Day Average: 132/90     Recent Readings 7/11/2018 7/10/2018 7/3/2018 7/2/2018 6/28/2018    SBP (mmHg) 134 134 125 134 139    DBP (mmHg) 87 89 88 82 91    Pulse 84 77 85 87 82        Hypertension Medications             amLODIPine (NORVASC) 10 MG tablet Take 1 tablet (10 mg total) by mouth every morning.    chlorthalidone (HYGROTEN) 25 MG Tab Take 1 tablet (25 mg total) by mouth every morning. Stop HCTZ.        Assessment/ Plan:   Called patient to follow up since increasing amlodipine to 10. Patient admits to slight hypotensive s/sx for about an hour after taking both chlorthalidone 25 and amlodipine 10 in the morning. Recommended patient separate medications, taking chlorthalidone 25 qam and amlodipine 10 at lunchtime. Patient was previously taking amlodipine at nighttime; however, she frequently forgot to take amlodipine when dosed at nighttime.     Per newly released 2017 ACC/ AHA HTN guidelines (goal of BP < 130/80), current 30-day average is uncontrolled, but does appear to be improving 2/2 amlodipine dose increase.   Patient denies having questions or concerns. Instructed patient to call if she has any questions or concerns, patient confirms understanding.   Will continue to monitor. WCB in 2 weeks.

## 2018-07-26 ENCOUNTER — PATIENT OUTREACH (OUTPATIENT)
Dept: OTHER | Facility: OTHER | Age: 43
End: 2018-07-26

## 2018-07-26 NOTE — PROGRESS NOTES
Last 5 Patient Entered Readings                                      Current 30 Day Average: 133/88     Recent Readings 7/25/2018 7/25/2018 7/24/2018 7/23/2018 7/11/2018    SBP (mmHg) 135 144 119 145 134    DBP (mmHg) 86 93 89 93 87    Pulse 89 83 85 77 84        Hypertension Medications             amLODIPine (NORVASC) 10 MG tablet Take 1 tablet (10 mg total) by mouth every morning. With lunch    chlorthalidone (HYGROTEN) 25 MG Tab Take 1 tablet (25 mg total) by mouth every morning. Stop HCTZ. QAM        Assessment/ Plan:   Called patient to follow up since instructing patient to separate timing of amlodipine and chlorthalidone. Patient denies hypotensive s/sx since doing so.   Per newly released 2017 ACC/ AHA HTN guidelines (goal of BP < 130/80), current 30-day average is uncontrolled.  Patient attributes dietary indiscretions to higher readings.   Patient denies having questions or concerns. Instructed patient to call if she has any questions or concerns, patient confirms understanding.   Will continue to monitor. WCB in 1 month. If BP remains elevated, will discuss adding an ARB.

## 2018-08-02 NOTE — PROGRESS NOTES
Last 5 Patient Entered Readings                                      Current 30 Day Average: 130/88     Recent Readings 7/31/2018 7/25/2018 7/25/2018 7/24/2018 7/23/2018    SBP (mmHg) 116 135 144 119 145    DBP (mmHg) 84 86 93 89 93    Pulse 79 89 83 85 77            Digital Medicine: Health  Follow Up    Left voicemail to follow up with Mrs. Adama Ortiz.  Current BP average 130/88 mmHg is not at goal, <130/80.  Want to discuss her low sodium diet with her.

## 2018-08-22 ENCOUNTER — PATIENT OUTREACH (OUTPATIENT)
Dept: OTHER | Facility: OTHER | Age: 43
End: 2018-08-22

## 2018-08-22 NOTE — PROGRESS NOTES
Last 5 Patient Entered Readings                                      Current 30 Day Average: 121/88     Recent Readings 8/20/2018 8/14/2018 8/13/2018 8/10/2018 8/9/2018    SBP (mmHg) 121 125 111 116 114    DBP (mmHg) 86 92 91 82 82    Pulse 75 79 85 97 81        Hypertension Medications             amLODIPine (NORVASC) 10 MG tablet Take 1 tablet (10 mg total) by mouth every morning.    chlorthalidone (HYGROTEN) 25 MG Tab Take 1 tablet (25 mg total) by mouth every morning. Stop HCTZ.        Assessment/ Plan:   Called patient to follow up, reviewed recent readings.   Per 2017 ACC/ AHA HTN guidelines (goal of BP < 130/80), current 30-day average is slightly uncontrolled, remains stable.   Patient requests assistance from HC regarding low-sodium snack options.   Patient denies having questions or concerns. Instructed patient to call if she has any questions or concerns, patient confirms understanding.   Will continue to monitor. WCB in 3 months, sooner if BP begins to trend up or down.

## 2018-08-30 NOTE — PROGRESS NOTES
Last 5 Patient Entered Readings                                      Current 30 Day Average: 119/87     Recent Readings 8/28/2018 8/27/2018 8/20/2018 8/14/2018 8/13/2018    SBP (mmHg) 130 124 121 125 111    DBP (mmHg) 91 84 86 92 91    Pulse 79 84 75 79 85          Digital Medicine: Health  Follow Up    Lifestyle Modifications:    1.Dietary Modifications (Sodium intake <2,000mg/day, food labels, dining out): Patient has been working hard on changing her diet and focusing on sodium intake. She continues using her instant pot so that she can control what goes on her food. She does still add salt to her roast but nothing else. We spoke about ways to cut that out a bit. She has switched over to swiss cheese because it is lower sodium and has cut out the deli meat. Patient needed resources for lower sodium pickles and chips. I was able to provided with alternatives so that she can make her own dill pickles that are salt free and a chip alternative that is only 5mg per serving size. Patient will continue making small changes. She is very motivated and even got her  involved with the changes as well.     2.Physical Activity: Deferred    3.Medication Therapy: Patient has been compliant with the medication regimen.    4.Patient has the following medication side effects/concerns:   (Frequency/Alleviating factors/Precipitating factors, etc.)     Follow up with Mrs. Adama Ortiz completed. No further questions or concerns. Will continue follow up to achieve health goals.

## 2018-10-01 ENCOUNTER — PATIENT OUTREACH (OUTPATIENT)
Dept: OTHER | Facility: OTHER | Age: 43
End: 2018-10-01

## 2018-10-01 NOTE — PROGRESS NOTES
Last 5 Patient Entered Readings                                      Current 30 Day Average: 115/85     Recent Readings 9/25/2018 9/25/2018 9/24/2018 9/24/2018 9/9/2018    SBP (mmHg) 105 119 112 125 129    DBP (mmHg) 84 89 85 89 87    Pulse 84 85 81 89 96          Digital Medicine: Health  Follow Up    Lifestyle Modifications:    1.Dietary Modifications (Sodium intake <2,000mg/day, food labels, dining out): Patient continues working on her sodium intake. She found a pickle option which is only 75mg of sodium. She is also eating at home more because she cannot stand how salty food tastes when eating out. She found a 0mg tortilla chip option as well. She will continue making small changes in order to help get her BP more controlled.     2.Physical Activity: Deferred    3.Medication Therapy: Patient has been compliant with the medication regimen. Patient is doing well on her current regimen. She denies symptoms/side effects.     4.Patient has the following medication side effects/concerns:   (Frequency/Alleviating factors/Precipitating factors, etc.)     Follow up with Mrs. Adama Ortiz completed. No further questions or concerns. Will continue to follow up to achieve health goals.

## 2018-10-05 ENCOUNTER — OFFICE VISIT (OUTPATIENT)
Dept: OBSTETRICS AND GYNECOLOGY | Facility: CLINIC | Age: 43
End: 2018-10-05
Payer: COMMERCIAL

## 2018-10-05 VITALS
BODY MASS INDEX: 47.68 KG/M2 | DIASTOLIC BLOOD PRESSURE: 80 MMHG | SYSTOLIC BLOOD PRESSURE: 124 MMHG | WEIGHT: 260.69 LBS

## 2018-10-05 DIAGNOSIS — Z12.4 CERVICAL CANCER SCREENING: ICD-10-CM

## 2018-10-05 DIAGNOSIS — Z12.39 BREAST CANCER SCREENING: ICD-10-CM

## 2018-10-05 DIAGNOSIS — Z01.419 WELL WOMAN EXAM WITH ROUTINE GYNECOLOGICAL EXAM: Primary | ICD-10-CM

## 2018-10-05 PROCEDURE — 99386 PREV VISIT NEW AGE 40-64: CPT | Mod: S$GLB,,, | Performed by: OBSTETRICS & GYNECOLOGY

## 2018-10-05 PROCEDURE — 87624 HPV HI-RISK TYP POOLED RSLT: CPT

## 2018-10-05 PROCEDURE — 88142 CYTOPATH C/V THIN LAYER: CPT

## 2018-10-05 PROCEDURE — 99999 PR PBB SHADOW E&M-EST. PATIENT-LVL III: CPT | Mod: PBBFAC,,, | Performed by: OBSTETRICS & GYNECOLOGY

## 2018-10-05 NOTE — PROGRESS NOTES
"[facility]  Ambulatory Clinic  Obstetrics & Gynecology    Visit Date:  10/05/2018    Chief Complaint:  Annual GYN exam    History of Present Illness:      Adama Ortiz is a 43 y.o. G0 here for a gynecologic exam.  Pt has no major complaints today.    Menses are regular/monthly, not heavy or painful.  Pt current method of family planning is natural family planning.  Pt is in process of getting IVF at [facility] due to "my age".  Last pap ~3 years ago was normal.  Pt denies active sexually transmitted infections.  Mammogram 5/2018 normal.  Pt performs monthly self breast examination, non-smoker, uses seat belts, and denies abuse.   Pt denies any abnormal vaginal bleeding, vaginal discharge, dysmenorrhea, dyspareunia, pelvic pain, breast mass/skin changes, incontinence, GI or urinary complaints.    Otherwise, the pt is in her usual state of health and has good follow-up with her PCP.    Past History:  Gynecologic history as noted above.    Review of Systems:      GENERAL:  No fever, fatigue, excessive weight gain or loss  HEENT:  No headaches, hearing changes, visual disturbance  RESPIRATORY:  No cough, shortness of breath  CARDIOVASCULAR:  No chest pain, heart palpitations, leg swelling  BREAST:  No lump, pain, nipple discharge, skin changes  GASTROINTESTINAL:  No nausea, vomiting, constipation, diarrhea, abd pain, rectal bleeding   GENITOURINARY:  See HPI    Physical Exam:     /80 (BP Location: Right arm, Patient Position: Sitting, BP Method: Large (Manual))   Wt 118.2 kg (260 lb 11.1 oz)   LMP 09/25/2018 (Exact Date)   BMI 47.68 kg/m²      GENERAL:  No acute distress, well-nourished  HEENT:  Atraumatic, anicteric, moist mucus membranes, neck supple.  BREAST:  Symmetric, nontender, no obvious masses, adenopathy, skin changes or nipple discharge.  LUNGS:  Clear to auscultation  HEART:  Regular rate and rhythm, no murmurs, gallops, or rubs  ABDOMEN:  Soft, non-tender, non-distended, " normoactive bowel sounds, no obvious organomegaly  EXT:  Symmetric w/o cramping, claudication, or edema. +2 distal pulses.  SKIN:  No rashes or bruising  PSYCH:  Mood and affect appropriate  GENITOURINARY:  NFEG no lesion. No vaginal or cervical lesion. No bleeding or discharge. No CMT. Uterus and ovaries small, NT. Wet prep negative. Declined rectal exam. No obvious external lesions.    Chaperone present for exam.    Assessment:     43 y.o. G0:    1. Well woman gynecologic exam    Plan:    A gynecologic health assessment was performed with age appropriate counseling.  Cervical cancer screening - pap obtained.  STI screening - Pt declined.  Safe sex discussed.    Screening mammogram up to date.  Pre-conception advice.  Encourage healthy lifestyle modifications, monthly self breast exams, Ca/Vit D, f/u with PCP for health maintenance.  F/u 1 year for GYN exam, or sooner as needed.    Pt voiced understanding.        Zachary Ceballos MD

## 2018-10-07 ENCOUNTER — PATIENT MESSAGE (OUTPATIENT)
Dept: ADMINISTRATIVE | Facility: OTHER | Age: 43
End: 2018-10-07

## 2018-10-12 LAB
HPV HR 12 DNA CVX QL NAA+PROBE: NEGATIVE
HPV16 AG SPEC QL: NEGATIVE
HPV18 DNA SPEC QL NAA+PROBE: NEGATIVE

## 2018-11-01 ENCOUNTER — PATIENT OUTREACH (OUTPATIENT)
Dept: OTHER | Facility: OTHER | Age: 43
End: 2018-11-01

## 2018-11-01 NOTE — PROGRESS NOTES
Last 5 Patient Entered Readings                                      Current 30 Day Average: 117/86     Recent Readings 10/30/2018 10/19/2018 10/5/2018 10/2/2018 10/2/2018    SBP (mmHg) 125 112 124 108 109    DBP (mmHg) 85 89 80 88 87    Pulse 88 85 - 79 79        Digital Medicine: Health  Follow Up    Lifestyle Modifications:    1.Dietary Modifications (Sodium intake <2,000mg/day, food labels, dining out): Patient continues working on her sodium intake. It makes small changes as often as she can. She reads all food labels and is now using the salt free Bharat. She no longer eats pickles (not even the 70mg sodium brand). Patient is disappointed that her diastolic number is still above the goal but I encouraged her to continue on this path and we will continue finding other ways to lower the sodium even more.     2.Physical Activity: Deferred    3.Medication Therapy: Patient has been compliant with the medication regimen. Patient is doing well on her current regimen. She denies symptoms/side effects.     4.Patient has the following medication side effects/concerns:   (Frequency/Alleviating factors/Precipitating factors, etc.)     Follow up with Mrs. Adama Ortiz completed. No further questions or concerns. Will continue to follow up to achieve health goals.

## 2018-11-14 ENCOUNTER — PATIENT OUTREACH (OUTPATIENT)
Dept: OTHER | Facility: OTHER | Age: 43
End: 2018-11-14

## 2018-11-14 DIAGNOSIS — I10 HYPERTENSION, UNSPECIFIED TYPE: Primary | ICD-10-CM

## 2018-11-14 RX ORDER — LABETALOL 100 MG/1
100 TABLET, FILM COATED ORAL EVERY 12 HOURS
Qty: 106 TABLET | Refills: 11 | Status: SHIPPED | OUTPATIENT
Start: 2018-11-14 | End: 2018-12-06 | Stop reason: ALTCHOICE

## 2018-11-14 NOTE — PROGRESS NOTES
HPI:  Called patient to follow up. Per IVAN Gurrola, labetalol is the preferred agent for patients undergoing the IVF process.     Last 5 Patient Entered Readings                                      Current 30 Day Average: 124/88     Recent Readings 11/13/2018 11/13/2018 10/30/2018 10/19/2018 10/5/2018    SBP (mmHg) 134 116 125 112 124    DBP (mmHg) 94 85 85 89 80    Pulse 80 91 88 85 -     Assessment:  Reviewed recent readings. Per 2017 ACC/ AHA HTN guidelines (goal of BP < 130/80), current 30-day average is uncontrolled (DBP).      Plan:  Discussed with and instructed patient to stop amlodipine and chlorthalidone and to start labetalol 100mg BID. If well tolerated after 1 week, will increase to 200mg BID.     Patients health , Prema Mcmanus, will be following up every 3-4 weeks. I will continue to monitor regularly and will follow-up in 1 week.     Current medication regimen:  Hypertension Medications             labetalol (NORMODYNE) 100 MG tablet Take 1 tablet (100 mg total) by mouth every 12 (twelve) hours. If well tolerated after 1 week, increase to 2 tablets (200mg) by mouth every 12 hours.        Patient denies having questions or concerns. Patient has my contact information and knows to call with any concerns or clinical changes.

## 2018-11-14 NOTE — PROGRESS NOTES
HPI:  Called patient to follow up.  Patient endorses adherence to medication regimen daily and denies missed doses.     Patient states she has begun the IVF process, starting injections on 11/22. Per [facility], patient will need to change HTN medications. Will reach out to Dr. Caleb Parsons's office regarding recommendations, [phone]. M for IVAN Gurrola.      Last 5 Patient Entered Readings                                      Current 30 Day Average: 124/88     Recent Readings 11/13/2018 11/13/2018 10/30/2018 10/19/2018 10/5/2018    SBP (mmHg) 134 116 125 112 124    DBP (mmHg) 94 85 85 89 80    Pulse 80 91 88 85 -        Assessment:  Reviewed recent readings. Per 2017 ACC/ AHA HTN guidelines (goal of BP < 130/80), current 30-day average is uncontrolled (DBP).     Plan:  Continue current medication regimen.    Patients health , Prema Mcmanus, will be following up every 3-4 weeks. I will continue to monitor regularly and will follow-up as soon as I hear back from [facility].    Current medication regimen:  Hypertension Medications             amLODIPine (NORVASC) 10 MG tablet Take 1 tablet (10 mg total) by mouth every morning.    chlorthalidone (HYGROTEN) 25 MG Tab Take 1 tablet (25 mg total) by mouth every morning. Stop HCTZ.        Patient denies having questions or concerns. Patient has my contact information and knows to call with any concerns or clinical changes.

## 2018-11-21 ENCOUNTER — PATIENT OUTREACH (OUTPATIENT)
Dept: OTHER | Facility: OTHER | Age: 43
End: 2018-11-21

## 2018-11-21 NOTE — PROGRESS NOTES
Plan:   Called patient to follow up since stopping amlodipine and chlorthalidone and starting labetalol due to upcoming IVF procedure. Patient does c/o JOE. Patient reports her next IVF appt is on 11/28.   Patient endorses adherence to medication regimen daily and denies missed doses. Patient denies hypotensive s/sx (lightheadedness, dizziness, nausea, fatigue); patient denies hypertensive s/sx (SOB, CP, severe headaches, changes in vision, dizziness, fatigue, confusion, anxiety, nosebleeds).    Last 5 Patient Entered Readings                                      Current 30 Day Average: 130/88     Recent Readings 11/13/2018 11/13/2018 10/30/2018 10/19/2018 10/5/2018    SBP (mmHg) 134 116 125 112 124    DBP (mmHg) 94 85 85 89 80    Pulse 80 91 88 85 -        Assessment:  Reviewed recent readings. Per 2017 ACC/ AHA HTN guidelines (goal of BP < 130/80), current 30-day average is uncontrolled.     Plan:  Continue current medication regimen, labetalol 100mg BID. Requested patient begin to take at least 3 readings per week. Due to lack of readings, WCB in 1 week and will discuss at that time whether or not to increase labetalol to 200mg BID.   Instructed patient to discuss JOE with provider at appt on 11/28.  Patients health , Prema Mcmanus, will be following up every 3-4 weeks. Will continue to monitor.    Current medication regimen:  Hypertension Medications             labetalol (NORMODYNE) 100 MG tablet Take 1 tablet (100 mg total) by mouth every 12 (twelve) hours. If well tolerated after 1 week, increase to 2 tablets (200mg) by mouth every 12 hours.        Patient denies having questions or concerns. Patient has my contact information and knows to call with any concerns or clinical changes.

## 2018-11-29 ENCOUNTER — PATIENT OUTREACH (OUTPATIENT)
Dept: OTHER | Facility: OTHER | Age: 43
End: 2018-11-29

## 2018-11-29 NOTE — PROGRESS NOTES
Last 5 Patient Entered Readings                                      Current 30 Day Average: 130/93     Recent Readings 11/28/2018 11/28/2018 11/27/2018 11/27/2018 11/27/2018    SBP (mmHg) 143 150 129 135 143    DBP (mmHg) 101 103 90 92 93    Pulse 73 82 72 77 78        Hypertension Medications             labetalol (NORMODYNE) 100 MG tablet Take 1 tablet (100 mg total) by mouth every 12 (twelve) hours. If well tolerated after 1 week, increase to 2 tablets (200mg) by mouth every 12 hours.        Plan:   Called patient to follow up, reviewed BP readings. Per 2017 ACC/ AHA HTN guidelines  (goal of BP < 130/80), current 30-day average is uncontrolled.  Would like to instruct patient to increase labetalol to 200mg BID.   LVM, requested patient call back at her convenience.  Will continue to monitor. WCB in 1 week.    Patient called back, LVM. Called patient back. LVM. Will continue to monitor. WCB in 1 week.

## 2018-11-30 NOTE — PROGRESS NOTES
HPI:  Called patient to follow up. Patient states she does not like the way she feels on labetalol; patient c/o fatigue, little energy, and headaches. Patient reports JOE has improved.   Patient states IVF transfer will occur in 1/2019.  Patient denies hypotensive s/sx (lightheadedness, dizziness, nausea, fatigue); patient denies hypertensive s/sx (SOB, CP, severe headaches, changes in vision, dizziness, fatigue, confusion, anxiety, nosebleeds).     Last 5 Patient Entered Readings                                      Current 30 Day Average: 136/95     Recent Readings 11/30/2018 11/29/2018 11/29/2018 11/28/2018 11/28/2018    SBP (mmHg) 145 145 149 143 150    DBP (mmHg) 100 100 99 101 103    Pulse 88 81 86 73 82        Assessment:  Reviewed recent readings. Per 2017 ACC/ AHA HTN guidelines (goal of BP < 130/80), current 30-day average is uncontrolled.     Plan:  Continue current medication regimen, labetalol 100mg BID. Instructed patient to ask Dr. Parsons if there are other antihypertensives he can recommend during the IVF process.     Patients health , Prema Mcmanus, will be following up every 3-4 weeks. I will continue to monitor regularly and will follow-up in 1 week.     Current medication regimen:  Hypertension Medications             labetalol (NORMODYNE) 100 MG tablet Take 1 tablet (100 mg total) by mouth every 12 (twelve) hours. If well tolerated after 1 week, increase to 2 tablets (200mg) by mouth every 12 hours.        Patient denies having questions or concerns. Patient has my contact information and knows to call with any concerns or clinical changes.

## 2018-12-04 ENCOUNTER — OFFICE VISIT (OUTPATIENT)
Dept: FAMILY MEDICINE | Facility: CLINIC | Age: 43
End: 2018-12-04
Payer: COMMERCIAL

## 2018-12-04 VITALS
OXYGEN SATURATION: 99 % | WEIGHT: 266.75 LBS | HEART RATE: 77 BPM | DIASTOLIC BLOOD PRESSURE: 100 MMHG | SYSTOLIC BLOOD PRESSURE: 140 MMHG | HEIGHT: 62 IN | BODY MASS INDEX: 49.09 KG/M2 | TEMPERATURE: 98 F

## 2018-12-04 DIAGNOSIS — I10 ESSENTIAL HYPERTENSION: Primary | ICD-10-CM

## 2018-12-04 DIAGNOSIS — E66.01 MORBID OBESITY WITH BMI OF 40.0-44.9, ADULT: ICD-10-CM

## 2018-12-04 PROCEDURE — 3008F BODY MASS INDEX DOCD: CPT | Mod: CPTII,S$GLB,, | Performed by: FAMILY MEDICINE

## 2018-12-04 PROCEDURE — 99999 PR PBB SHADOW E&M-EST. PATIENT-LVL III: CPT | Mod: PBBFAC,,, | Performed by: FAMILY MEDICINE

## 2018-12-04 PROCEDURE — 99214 OFFICE O/P EST MOD 30 MIN: CPT | Mod: S$GLB,,, | Performed by: FAMILY MEDICINE

## 2018-12-04 PROCEDURE — 3080F DIAST BP >= 90 MM HG: CPT | Mod: CPTII,S$GLB,, | Performed by: FAMILY MEDICINE

## 2018-12-04 PROCEDURE — 3077F SYST BP >= 140 MM HG: CPT | Mod: CPTII,S$GLB,, | Performed by: FAMILY MEDICINE

## 2018-12-04 RX ORDER — NIFEDIPINE 30 MG/1
30 TABLET, FILM COATED, EXTENDED RELEASE ORAL DAILY
Qty: 30 TABLET | Refills: 0 | Status: SHIPPED | OUTPATIENT
Start: 2018-12-04 | End: 2019-01-02 | Stop reason: SDUPTHER

## 2018-12-04 NOTE — PROGRESS NOTES
HISTORY OF PRESENT ILLNESS:  Adama Ortiz is a 43 y.o. female who presents to the clinic today for Hypertension  .     Hypertension: The patient reports that they check their blood pressures regularly and blood pressure generally is not well controlled (>139/89). The patient  is enrolled and active in the digital hypertension program. The patient denies  cardiac chest pain, shortness of breath, lower extremity edema, headaches and side effects of medication. The patient reports problems with  none. The patient  has been compliant with the current medication regimen.  The patient : tries to follow a low salt diet.  Per problem list.    She is trying to get pregnant      PAST MEDICAL HISTORY:  Past Medical History:   Diagnosis Date    Hypertension        PAST SURGICAL HISTORY:  Past Surgical History:   Procedure Laterality Date    APPENDECTOMY      BREAST SURGERY      DILATION AND CURETTAGE OF UTERUS      TOTAL REDUCTION MAMMOPLASTY Bilateral 2006       SOCIAL HISTORY:  Social History     Socioeconomic History    Marital status:      Spouse name: Not on file    Number of children: Not on file    Years of education: Not on file    Highest education level: Not on file   Social Needs    Financial resource strain: Not on file    Food insecurity - worry: Not on file    Food insecurity - inability: Not on file    Transportation needs - medical: Not on file    Transportation needs - non-medical: Not on file   Occupational History    Not on file   Tobacco Use    Smoking status: Never Smoker    Smokeless tobacco: Never Used   Substance and Sexual Activity    Alcohol use: No     Alcohol/week: 0.0 oz     Comment: occ    Drug use: No    Sexual activity: Yes     Partners: Male     Birth control/protection: None   Other Topics Concern    Not on file   Social History Narrative    Not on file       FAMILY HISTORY:  Family History   Problem Relation Age of Onset    Hypertension Mother      "Hyperlipidemia Mother        ALLERGIES AND MEDICATIONS: updated and reviewed.  Review of patient's allergies indicates:   Allergen Reactions    Aspirin Swelling    Keflex [cephalexin] Swelling and Rash        Medication List           Accurate as of 12/4/18  2:03 PM. If you have any questions, ask your nurse or doctor.               START taking these medications    NIFEdipine 30 MG Tbsr  Commonly known as:  ADALAT CC  Take 1 tablet (30 mg total) by mouth once daily.  Started by:  Maurizio Prather MD        CONTINUE taking these medications    labetalol 100 MG tablet  Commonly known as:  NORMODYNE  Take 1 tablet (100 mg total) by mouth every 12 (twelve) hours. If well tolerated after 1 week, increase to 2 tablets (200mg) by mouth every 12 hours.        STOP taking these medications    amitriptyline 10 MG tablet  Commonly known as:  ELAVIL  Stopped by:  Maurizio Prather MD     diclofenac sodium 1 % Gel  Commonly known as:  VOLTAREN  Stopped by:  Maurizio Prather MD           Where to Get Your Medications      These medications were sent to [id number]  [place] - [address]  [address] [address]    Phone:  [phone]   · NIFEdipine 30 MG Tbsr            CARE TEAM:  Patient Care Team:  Maurizio Prather MD as PCP - General (Family Medicine)  Prema Mcmanus as Digital Medicine Health   Maurizio Prather MD as Hypertension Digital Medicine Responsible Provider (Family Medicine)  Mary Martines, PharmD as Hypertension Digital Medicine Clinician (Pharmacist)         REVIEW OF SYSTEMS:  Review of Systems  Per HPI    PHYSICAL EXAM:   Vitals:    12/04/18 1327   BP: (!) 140/100   Pulse: 77   Temp: 98.1 °F (36.7 °C)     Weight: 121 kg (266 lb 12.1 oz)   Height: 5' 2" (157.5 cm)   Body mass index is 48.79 kg/m².     She appears well, in no apparent distress.  Alert and oriented times three, pleasant and cooperative. Vital signs are as documented in vital signs section.  Obese  Wt " Readings from Last 15 Encounters:   12/04/18 121 kg (266 lb 12.1 oz)   10/05/18 118.2 kg (260 lb 11.1 oz)   04/09/18 119 kg (262 lb 5.6 oz)   12/21/17 117.9 kg (260 lb)   12/15/17 114.8 kg (253 lb)   09/18/17 115 kg (253 lb 8.5 oz)   05/01/17 116.6 kg (257 lb 0.9 oz)   01/31/17 118 kg (260 lb 2.3 oz)   01/17/17 117.9 kg (259 lb 14.8 oz)   08/17/16 114.8 kg (253 lb 1.4 oz)   08/10/16 113 kg (249 lb 1.9 oz)   05/26/16 114.1 kg (251 lb 8.7 oz)   05/05/16 116.6 kg (257 lb 0.9 oz)   04/26/16 118.2 kg (260 lb 9.3 oz)   09/29/15 117.9 kg (260 lb)   weight just came up      ASSESSMENT AND PLAN:  1. Essential hypertension  Start nifedipine  Potential medication side effects were discussed with the patient; let me know if any occur.    - CBC auto differential; Future  - Comprehensive metabolic panel; Future    2. Morbid obesity with BMI of 40.0-44.9, adult  The patient is asked to make an attempt to improve diet and exercise patterns to aid in medical management of this problem.           No future appointments.    No Follow-up on file. or sooner as needed.

## 2018-12-06 ENCOUNTER — PATIENT OUTREACH (OUTPATIENT)
Dept: OTHER | Facility: OTHER | Age: 43
End: 2018-12-06

## 2018-12-06 DIAGNOSIS — I10 HYPERTENSION, UNSPECIFIED TYPE: ICD-10-CM

## 2018-12-06 NOTE — PROGRESS NOTES
HPI:  Called patient to follow up. Patient reports she was seen by PCP on 12/4, BP was 140/100.  Labetalol was stopped and nifedipine was started. Patient reports IVF stimulation was not successful, she will be starting IVF process again in March.     Patient endorses adherence to medication regimen daily and denies missed doses. Patient denies hypotensive s/sx (lightheadedness, dizziness, nausea, fatigue); patient denies hypertensive s/sx (SOB, CP, severe headaches, changes in vision, dizziness, fatigue, confusion, anxiety, nosebleeds).     Last 5 Patient Entered Readings                                      Current 30 Day Average: 136/95     Recent Readings 11/30/2018 11/29/2018 11/29/2018 11/28/2018 11/28/2018    SBP (mmHg) 145 145 149 143 150    DBP (mmHg) 100 100 99 101 103    Pulse 88 81 86 73 82     Assessment:  Reviewed recent readings. Per 2017 ACC/ AHA HTN guidelines (goal of BP < 130/80), current 30-day average is uncontrolled.     Plan:  Continue current medication regimen. Instructed patient to begin taking daily readings for the next week. Patients health , Prema Mcmanus, will be following up every 3-4 weeks. I will continue to monitor regularly and will follow-up in 1 week. If BP remains elevated, will discuss increasing nifedipine to 60mg, then possibly reintroducing chlorthalidone.     Current medication regimen:  Hypertension Medications             NIFEdipine (ADALAT CC) 30 MG TbSR Take 1 tablet (30 mg total) by mouth once daily.        Patient denies having questions or concerns. Patient has my contact information and knows to call with any concerns or clinical changes.

## 2018-12-12 ENCOUNTER — PATIENT OUTREACH (OUTPATIENT)
Dept: OTHER | Facility: OTHER | Age: 43
End: 2018-12-12

## 2018-12-12 DIAGNOSIS — I10 HYPERTENSION, UNSPECIFIED TYPE: Primary | ICD-10-CM

## 2018-12-12 NOTE — PROGRESS NOTES
Last 5 Patient Entered Readings                                      Current 30 Day Average: 136/95     Recent Readings 11/30/2018 11/29/2018 11/29/2018 11/28/2018 11/28/2018    SBP (mmHg) 145 145 149 143 150    DBP (mmHg) 100 100 99 101 103    Pulse 88 81 86 73 82        Hypertension Medications             NIFEdipine (ADALAT CC) 30 MG TbSR Take 1 tablet (30 mg total) by mouth once daily.        Plan:   Called patient to follow up, reviewed BP readings. Per 2017 ACC/ AHA HTN guidelines  (goal of BP < 130/80), current 30-day average is uncontrolled.  LVM, requested patient call back at her convenience and to begin taking daily readings for the next week.   Will continue to monitor. WCB in 1 week.

## 2018-12-18 NOTE — PROGRESS NOTES
HPI:  Called patient to follow up, patient c/o URI. Patient endorses adherence to medication regimen daily and denies missed doses. Patient denies hypotensive s/sx (lightheadedness, dizziness, nausea, fatigue); patient denies hypertensive s/sx (SOB, CP, severe headaches, changes in vision, dizziness, fatigue, confusion, anxiety, nosebleeds).    Last 5 Patient Entered Readings                                      Current 30 Day Average: 136/96     Recent Readings 11/30/2018 11/29/2018 11/29/2018 11/28/2018 11/28/2018    SBP (mmHg) 145 145 149 143 150    DBP (mmHg) 100 100 99 101 103    Pulse 88 81 86 73 82        Assessment:  Reviewed recent readings. Per 2017 ACC/ AHA HTN guidelines (goal of BP < 130/80), current 30-day average is uncontrolled. No readings since 11/30.     Plan:  Due to lack to readings, continue current medication regimen. Instructed patient to resume frequent readings so I can better manage her BP, patient confirms understanding. Patients health , Prema Mcmanus, will be following up every 3-4 weeks. I will continue to monitor regularly and will follow-up in 1 to 2 weeks, sooner if blood pressure begins to trend upward or downward. If BP remains elevated, will discuss increasing nifedipine to 60mg.     Current medication regimen:  Hypertension Medications             NIFEdipine (ADALAT CC) 30 MG TbSR Take 1 tablet (30 mg total) by mouth once daily.        Patient denies having questions or concerns. Patient has my contact information and knows to call with any concerns or clinical changes.

## 2018-12-31 NOTE — PROGRESS NOTES
Last 5 Patient Entered Readings                                      Current 30 Day Average: 136/96     Recent Readings 11/30/2018 11/29/2018 11/29/2018 11/28/2018 11/28/2018    SBP (mmHg) 145 145 149 143 150    DBP (mmHg) 100 100 99 101 103    Pulse 88 81 86 73 82        Hypertension Medications             NIFEdipine (ADALAT CC) 30 MG TbSR Take 1 tablet (30 mg total) by mouth once daily.        Plan:   Called patient to follow up, still no readings since we spoke last. Reviewed BP readings. Per 2017 ACC/ AHA HTN guidelines  (goal of BP < 130/80), current 30-day average is uncontrolled.  LVM, requested patient call back at her convenience if she has any questions or concerns, and to continue to take at least weekly BP readings.   Will continue to monitor. WCB in 3 weeks. If BP readings remain elevated, will likely need to increase nifedipine to 60mg and possible add back chlorthalidone.

## 2019-01-02 ENCOUNTER — OFFICE VISIT (OUTPATIENT)
Dept: FAMILY MEDICINE | Facility: CLINIC | Age: 44
End: 2019-01-02
Payer: COMMERCIAL

## 2019-01-02 VITALS
SYSTOLIC BLOOD PRESSURE: 144 MMHG | OXYGEN SATURATION: 98 % | HEIGHT: 62 IN | DIASTOLIC BLOOD PRESSURE: 86 MMHG | HEART RATE: 83 BPM | BODY MASS INDEX: 47.87 KG/M2 | TEMPERATURE: 98 F | WEIGHT: 260.13 LBS

## 2019-01-02 DIAGNOSIS — I10 ESSENTIAL HYPERTENSION: Primary | ICD-10-CM

## 2019-01-02 DIAGNOSIS — E66.01 MORBID OBESITY WITH BMI OF 40.0-44.9, ADULT: ICD-10-CM

## 2019-01-02 DIAGNOSIS — E22.1 HYPERPROLACTINEMIA: ICD-10-CM

## 2019-01-02 PROCEDURE — 99215 OFFICE O/P EST HI 40 MIN: CPT | Mod: S$GLB,,, | Performed by: FAMILY MEDICINE

## 2019-01-02 PROCEDURE — 99999 PR PBB SHADOW E&M-EST. PATIENT-LVL III: ICD-10-PCS | Mod: PBBFAC,,, | Performed by: FAMILY MEDICINE

## 2019-01-02 PROCEDURE — 3008F PR BODY MASS INDEX (BMI) DOCUMENTED: ICD-10-PCS | Mod: CPTII,S$GLB,, | Performed by: FAMILY MEDICINE

## 2019-01-02 PROCEDURE — 3079F DIAST BP 80-89 MM HG: CPT | Mod: CPTII,S$GLB,, | Performed by: FAMILY MEDICINE

## 2019-01-02 PROCEDURE — 3079F PR MOST RECENT DIASTOLIC BLOOD PRESSURE 80-89 MM HG: ICD-10-PCS | Mod: CPTII,S$GLB,, | Performed by: FAMILY MEDICINE

## 2019-01-02 PROCEDURE — 3077F SYST BP >= 140 MM HG: CPT | Mod: CPTII,S$GLB,, | Performed by: FAMILY MEDICINE

## 2019-01-02 PROCEDURE — 99215 PR OFFICE/OUTPT VISIT, EST, LEVL V, 40-54 MIN: ICD-10-PCS | Mod: S$GLB,,, | Performed by: FAMILY MEDICINE

## 2019-01-02 PROCEDURE — 3077F PR MOST RECENT SYSTOLIC BLOOD PRESSURE >= 140 MM HG: ICD-10-PCS | Mod: CPTII,S$GLB,, | Performed by: FAMILY MEDICINE

## 2019-01-02 PROCEDURE — 99999 PR PBB SHADOW E&M-EST. PATIENT-LVL III: CPT | Mod: PBBFAC,,, | Performed by: FAMILY MEDICINE

## 2019-01-02 PROCEDURE — 3008F BODY MASS INDEX DOCD: CPT | Mod: CPTII,S$GLB,, | Performed by: FAMILY MEDICINE

## 2019-01-02 RX ORDER — NIFEDIPINE 30 MG/1
30 TABLET, FILM COATED, EXTENDED RELEASE ORAL DAILY
Qty: 30 TABLET | Refills: 0 | Status: SHIPPED | OUTPATIENT
Start: 2019-01-02 | End: 2019-02-12 | Stop reason: SDUPTHER

## 2019-01-02 NOTE — PROGRESS NOTES
HISTORY OF PRESENT ILLNESS:  Adama Ortiz is a 43 y.o. female who presents to the clinic today for Hypertension (follow up )  .     Hypertension: The patient reports that they check their blood pressures regularly and blood pressure is generally well controlled (<= 139/89).  The patient  enrolled but took break from taking it. The patient denies  cardiac chest pain, shortness of breath, lower extremity edema, headaches and side effects of medication. The patient reports problems with  none. The patient  has not been compliant with the current medication regimen for the following reason(s):  forgets to take medications.  The patient : tries to follow a low salt diet.  Per problem list.      PAST MEDICAL HISTORY:  Past Medical History:   Diagnosis Date    Hypertension        PAST SURGICAL HISTORY:  Past Surgical History:   Procedure Laterality Date    APPENDECTOMY      BREAST SURGERY      DILATION AND CURETTAGE OF UTERUS      TOTAL REDUCTION MAMMOPLASTY Bilateral 2006       SOCIAL HISTORY:  Social History     Socioeconomic History    Marital status:      Spouse name: Not on file    Number of children: Not on file    Years of education: Not on file    Highest education level: Not on file   Social Needs    Financial resource strain: Not on file    Food insecurity - worry: Not on file    Food insecurity - inability: Not on file    Transportation needs - medical: Not on file    Transportation needs - non-medical: Not on file   Occupational History    Not on file   Tobacco Use    Smoking status: Never Smoker    Smokeless tobacco: Never Used   Substance and Sexual Activity    Alcohol use: No     Alcohol/week: 0.0 oz     Comment: occ    Drug use: No    Sexual activity: Yes     Partners: Male     Birth control/protection: None   Other Topics Concern    Not on file   Social History Narrative    Not on file       FAMILY HISTORY:  Family History   Problem Relation Age of Onset    Hypertension  "Mother     Hyperlipidemia Mother        ALLERGIES AND MEDICATIONS: updated and reviewed.  Review of patient's allergies indicates:   Allergen Reactions    Aspirin Swelling    Keflex [cephalexin] Swelling and Rash        Medication List           Accurate as of 1/2/19  3:22 PM. If you have any questions, ask your nurse or doctor.               CONTINUE taking these medications    NIFEdipine 30 MG Tbsr  Commonly known as:  ADALAT CC  Take 1 tablet (30 mg total) by mouth once daily.           Where to Get Your Medications      These medications were sent to [facility]  [place] - [address]  [address] [address]    Phone:  [phone]   · NIFEdipine 30 MG Tbsr            CARE TEAM:  Patient Care Team:  Maurizio Prather MD as PCP - General (Family Medicine)  Prema Mcmanus as Digital Medicine Health   Maurizio Prather MD as Hypertension Digital Medicine Responsible Provider (Family Medicine)  Christiano ClarkD as Hypertension Digital Medicine Clinician (Pharmacist)         REVIEW OF SYSTEMS:  Review of Systems      PHYSICAL EXAM:   Vitals:    01/02/19 1505   BP: (!) 144/86   Pulse: 83   Temp: 97.7 °F (36.5 °C)     Weight: 118 kg (260 lb 2.3 oz)   Height: 5' 2" (157.5 cm)   Body mass index is 47.58 kg/m².    Wt Readings from Last 15 Encounters:   01/02/19 118 kg (260 lb 2.3 oz)   12/04/18 121 kg (266 lb 12.1 oz)   10/05/18 118.2 kg (260 lb 11.1 oz)   04/09/18 119 kg (262 lb 5.6 oz)   12/21/17 117.9 kg (260 lb)   12/15/17 114.8 kg (253 lb)   09/18/17 115 kg (253 lb 8.5 oz)   05/01/17 116.6 kg (257 lb 0.9 oz)   01/31/17 118 kg (260 lb 2.3 oz)   01/17/17 117.9 kg (259 lb 14.8 oz)   08/17/16 114.8 kg (253 lb 1.4 oz)   08/10/16 113 kg (249 lb 1.9 oz)   05/26/16 114.1 kg (251 lb 8.7 oz)   05/05/16 116.6 kg (257 lb 0.9 oz)   04/26/16 118.2 kg (260 lb 9.3 oz)        She appears well, in no apparent distress.  Alert and oriented times three, pleasant and " cooperative. Vital signs are as documented in vital signs section.  S1 and S2 normal, no murmurs, clicks, gallops or rubs. Regular rate and rhythm. Chest is clear; no wheezes or rales. No edema or JVD.        ASSESSMENT AND PLAN:  1. Essential hypertension  Continue with the nifedipine    2. Morbid obesity with BMI of 40.0-44.9, adult  The patient is asked to make an attempt to improve diet and exercise patterns to aid in medical management of this problem.      3. Hyperprolactinemia  The current medical regimen is effective;  continue present plan and medications.       Counseled on her infertility issues as well and her over all mental state  Spent 38 minutes in face to face counseling > 40 minutes total    No future appointments.    Follow-up in about 3 months (around 4/2/2019) for assess treatment plan, HTN managment. or sooner as needed.

## 2019-01-08 ENCOUNTER — PATIENT OUTREACH (OUTPATIENT)
Dept: OTHER | Facility: OTHER | Age: 44
End: 2019-01-08

## 2019-01-08 NOTE — PROGRESS NOTES
Last 5 Patient Entered Readings                                      Current 30 Day Average:      Recent Readings 11/30/2018 11/29/2018 11/29/2018 11/28/2018 11/28/2018    SBP (mmHg) 145 145 149 143 150    DBP (mmHg) 100 100 99 101 103    Pulse 88 81 86 73 82          Digital Medicine: Health  Follow Up    Lifestyle Modifications:    1.Dietary Modifications (Sodium intake <2,000mg/day, food labels, dining out): Deferred    2.Physical Activity: Deferred    3.Medication Therapy: Patient has not been compliant with the medication regimen. Patient stated that she was not taking her BP medication around the holidays because she was off of work for two weeks and being that she was out of her routine, she continued to forget to take her medication. She is back on track as of this week.     4.Patient has the following medication side effects/concerns:   (Frequency/Alleviating factors/Precipitating factors, etc.)     I requested that the patient start taking her BP readings again. Patient expressed understanding and apologized for getting off track. I requested that she take about 3-4 each week so that her PharmD, MICHAEL Krause, can have enough data to review to see if a change in BP medication is necessary.   Advised her to reach out to me if she experiences any technical issues.     Follow up with Mrs. Adama Ortiz completed. No further questions or concerns. Will continue to follow up to achieve health goals.

## 2019-01-13 ENCOUNTER — PATIENT MESSAGE (OUTPATIENT)
Dept: ADMINISTRATIVE | Facility: OTHER | Age: 44
End: 2019-01-13

## 2019-01-23 ENCOUNTER — OFFICE VISIT (OUTPATIENT)
Dept: FAMILY MEDICINE | Facility: CLINIC | Age: 44
End: 2019-01-23
Payer: COMMERCIAL

## 2019-01-23 VITALS
DIASTOLIC BLOOD PRESSURE: 94 MMHG | BODY MASS INDEX: 48.88 KG/M2 | SYSTOLIC BLOOD PRESSURE: 146 MMHG | HEIGHT: 62 IN | TEMPERATURE: 98 F | WEIGHT: 265.63 LBS | HEART RATE: 70 BPM | OXYGEN SATURATION: 98 % | RESPIRATION RATE: 18 BRPM

## 2019-01-23 DIAGNOSIS — H69.93 EUSTACHIAN TUBE DYSFUNCTION, BILATERAL: ICD-10-CM

## 2019-01-23 DIAGNOSIS — I10 ESSENTIAL HYPERTENSION: ICD-10-CM

## 2019-01-23 DIAGNOSIS — R09.82 PND (POST-NASAL DRIP): Primary | ICD-10-CM

## 2019-01-23 PROCEDURE — 99999 PR PBB SHADOW E&M-EST. PATIENT-LVL IV: ICD-10-PCS | Mod: PBBFAC,,, | Performed by: PHYSICIAN ASSISTANT

## 2019-01-23 PROCEDURE — 3077F PR MOST RECENT SYSTOLIC BLOOD PRESSURE >= 140 MM HG: ICD-10-PCS | Mod: CPTII,S$GLB,, | Performed by: PHYSICIAN ASSISTANT

## 2019-01-23 PROCEDURE — 99999 PR PBB SHADOW E&M-EST. PATIENT-LVL IV: CPT | Mod: PBBFAC,,, | Performed by: PHYSICIAN ASSISTANT

## 2019-01-23 PROCEDURE — 3008F PR BODY MASS INDEX (BMI) DOCUMENTED: ICD-10-PCS | Mod: CPTII,S$GLB,, | Performed by: PHYSICIAN ASSISTANT

## 2019-01-23 PROCEDURE — 3008F BODY MASS INDEX DOCD: CPT | Mod: CPTII,S$GLB,, | Performed by: PHYSICIAN ASSISTANT

## 2019-01-23 PROCEDURE — 3080F DIAST BP >= 90 MM HG: CPT | Mod: CPTII,S$GLB,, | Performed by: PHYSICIAN ASSISTANT

## 2019-01-23 PROCEDURE — 99213 OFFICE O/P EST LOW 20 MIN: CPT | Mod: S$GLB,,, | Performed by: PHYSICIAN ASSISTANT

## 2019-01-23 PROCEDURE — 99213 PR OFFICE/OUTPT VISIT, EST, LEVL III, 20-29 MIN: ICD-10-PCS | Mod: S$GLB,,, | Performed by: PHYSICIAN ASSISTANT

## 2019-01-23 PROCEDURE — 3080F PR MOST RECENT DIASTOLIC BLOOD PRESSURE >= 90 MM HG: ICD-10-PCS | Mod: CPTII,S$GLB,, | Performed by: PHYSICIAN ASSISTANT

## 2019-01-23 PROCEDURE — 3077F SYST BP >= 140 MM HG: CPT | Mod: CPTII,S$GLB,, | Performed by: PHYSICIAN ASSISTANT

## 2019-01-23 RX ORDER — LEVOCETIRIZINE DIHYDROCHLORIDE 5 MG/1
5 TABLET, FILM COATED ORAL NIGHTLY
Qty: 30 TABLET | Refills: 11 | Status: SHIPPED | OUTPATIENT
Start: 2019-01-23 | End: 2020-10-07 | Stop reason: ALTCHOICE

## 2019-01-23 NOTE — PROGRESS NOTES
Subjective:       Patient ID: Adama Ortiz is a 43 y.o. female with multiple medical diagnoses as listed in the medical history and problem list that presents for Cough and Sore Throat  .    Chief Complaint: Cough and Sore Throat      Cough   This is a new problem. The current episode started in the past 7 days. Associated symptoms include ear pain (both but alternating ), postnasal drip and a sore throat (waxing and waning ). Pertinent negatives include no chills, eye redness, fever, headaches, rash, rhinorrhea, shortness of breath or wheezing. Treatments tried: robutussin CF, halls    Sore Throat    Associated symptoms include coughing and ear pain (both but alternating ). Pertinent negatives include no abdominal pain, congestion, diarrhea, headaches, shortness of breath, trouble swallowing or vomiting.     Review of Systems   Constitutional: Negative for chills and fever.   HENT: Positive for ear pain (both but alternating ), postnasal drip and sore throat (waxing and waning ). Negative for congestion, rhinorrhea, sinus pressure, sinus pain, sneezing and trouble swallowing.    Eyes: Negative for photophobia, pain, discharge, redness and itching.   Respiratory: Positive for cough. Negative for chest tightness, shortness of breath and wheezing.    Gastrointestinal: Negative for abdominal pain, constipation, diarrhea, nausea and vomiting.   Skin: Negative for rash.   Neurological: Negative for headaches.         PAST MEDICAL HISTORY:  Past Medical History:   Diagnosis Date    Hypertension        SOCIAL HISTORY:  Social History     Socioeconomic History    Marital status:      Spouse name: Not on file    Number of children: Not on file    Years of education: Not on file    Highest education level: Not on file   Social Needs    Financial resource strain: Not on file    Food insecurity - worry: Not on file    Food insecurity - inability: Not on file    Transportation needs - medical: Not on file     "Transportation needs - non-medical: Not on file   Occupational History    Not on file   Tobacco Use    Smoking status: Never Smoker    Smokeless tobacco: Never Used   Substance and Sexual Activity    Alcohol use: No     Alcohol/week: 0.0 oz     Comment: occ    Drug use: No    Sexual activity: Yes     Partners: Male     Birth control/protection: None   Other Topics Concern    Not on file   Social History Narrative    Not on file       ALLERGIES AND MEDICATIONS: updated and reviewed.  Review of patient's allergies indicates:   Allergen Reactions    Aspirin Swelling    Keflex [cephalexin] Swelling and Rash     Current Outpatient Medications   Medication Sig Dispense Refill    levocetirizine (XYZAL) 5 MG tablet Take 1 tablet (5 mg total) by mouth every evening. 30 tablet 11    NIFEdipine (ADALAT CC) 30 MG TbSR Take 1 tablet (30 mg total) by mouth once daily. 30 tablet 0     No current facility-administered medications for this visit.          Objective:   BP (!) 146/94 (BP Location: Right arm, Patient Position: Sitting, BP Method: Large (Manual))   Pulse 70   Temp 97.6 °F (36.4 °C) (Oral)   Resp 18   Ht 5' 2" (1.575 m)   Wt 120.5 kg (265 lb 10.5 oz)   SpO2 98%   BMI 48.59 kg/m²      Physical Exam   Constitutional: She is oriented to person, place, and time. No distress.   HENT:   Head: Normocephalic and atraumatic.   Right Ear: External ear and ear canal normal. No middle ear effusion.   Left Ear: Tympanic membrane, external ear and ear canal normal.  No middle ear effusion.   Nose: No mucosal edema or rhinorrhea. Right sinus exhibits no maxillary sinus tenderness and no frontal sinus tenderness. Left sinus exhibits no maxillary sinus tenderness and no frontal sinus tenderness.   Mouth/Throat: Uvula is midline and mucous membranes are normal. Posterior oropharyngeal erythema (PND) present. No oropharyngeal exudate.   Air fluid levels    Eyes: Conjunctivae and EOM are normal.   Cardiovascular: Normal " rate and regular rhythm.   Pulmonary/Chest: Effort normal and breath sounds normal. She has no wheezes.   Lymphadenopathy:     She has no cervical adenopathy.   Neurological: She is alert and oriented to person, place, and time.   Skin: Skin is warm.           Assessment:       1. PND (post-nasal drip)    2. Essential hypertension    3. Eustachian tube dysfunction, bilateral        Plan:       PND (post-nasal drip)  -     levocetirizine (XYZAL) 5 MG tablet; Take 1 tablet (5 mg total) by mouth every evening.  Dispense: 30 tablet; Refill: 11    Essential hypertension  Managed through [facility].     Eustachian tube dysfunction, bilateral  As above  Call if not improved.   Add flonase If needed           No Follow-up on file.

## 2019-02-01 ENCOUNTER — PATIENT MESSAGE (OUTPATIENT)
Dept: FAMILY MEDICINE | Facility: CLINIC | Age: 44
End: 2019-02-01

## 2019-02-01 RX ORDER — AZELASTINE 1 MG/ML
1 SPRAY, METERED NASAL 2 TIMES DAILY
Qty: 30 ML | Refills: 0 | Status: SHIPPED | OUTPATIENT
Start: 2019-02-01 | End: 2020-10-07 | Stop reason: ALTCHOICE

## 2019-02-01 RX ORDER — FLUTICASONE PROPIONATE 50 MCG
1 SPRAY, SUSPENSION (ML) NASAL DAILY
Qty: 16 G | Refills: 12 | Status: SHIPPED | OUTPATIENT
Start: 2019-02-01 | End: 2019-03-03

## 2019-02-04 NOTE — PROGRESS NOTES
Last 5 Patient Entered Readings                                      Current 30 Day Average: 135/94     Recent Readings 1/29/2019 1/29/2019 1/24/2019 1/24/2019 1/23/2019    SBP (mmHg) 133 115 123 134 157    DBP (mmHg) 89 90 91 98 104    Pulse 85 87 73 77 78        Hypertension Medications             NIFEdipine (ADALAT CC) 30 MG TbSR Take 1 tablet (30 mg total) by mouth once daily.        Plan:   Called patient to follow up, reviewed BP readings. Per 2017 ACC/ AHA HTN guidelines  (goal of BP < 130/80), current 30-day average is uncontrolled.  LVM, requested patient call back at her convenience. Would like to discuss changing nifedipine back to amlodipine due to ADRs accompanied by nifedipine (JOE and headaches). Would also recommend restarting chlorthalidone.   Will continue to monitor. WCB in 3 weeks.

## 2019-02-06 ENCOUNTER — PATIENT OUTREACH (OUTPATIENT)
Dept: OTHER | Facility: OTHER | Age: 44
End: 2019-02-06

## 2019-02-06 NOTE — PROGRESS NOTES
Last 5 Patient Entered Readings                                      Current 30 Day Average: 135/94     Recent Readings 1/29/2019 1/29/2019 1/24/2019 1/24/2019 1/23/2019    SBP (mmHg) 133 115 123 134 157    DBP (mmHg) 89 90 91 98 104    Pulse 85 87 73 77 78          Digital Medicine: Health  Follow Up    Left voicemail to follow up with Hoang Adama Ortiz.  Current BP average 135/94 mmHg is not at goal, <130/80.

## 2019-02-16 RX ORDER — NIFEDIPINE 30 MG/1
TABLET, FILM COATED, EXTENDED RELEASE ORAL
Qty: 30 TABLET | Refills: 0 | Status: SHIPPED | OUTPATIENT
Start: 2019-02-16 | End: 2019-02-25 | Stop reason: DRUGHIGH

## 2019-02-25 RX ORDER — NIFEDIPINE 60 MG/1
60 TABLET, EXTENDED RELEASE ORAL DAILY
Qty: 30 TABLET | Refills: 11 | Status: SHIPPED | OUTPATIENT
Start: 2019-02-25 | End: 2019-05-28 | Stop reason: SDUPTHER

## 2019-02-25 NOTE — PROGRESS NOTES
HPI:  Called patient to follow up. Patient denies adherence to medication regimen daily, states she recently missed 4 days of medication.   Patient denies hypotensive s/sx (lightheadedness, dizziness, nausea, fatigue); patient denies hypertensive s/sx (SOB, CP, severe headaches, changes in vision, dizziness, fatigue, confusion, anxiety, nosebleeds).     Last 5 Patient Entered Readings                                      Current 30 Day Average: 138/92     Recent Readings 2/19/2019 2/18/2019 1/29/2019 1/29/2019 1/24/2019    SBP (mmHg) 138 144 133 115 123    DBP (mmHg) 92 98 89 90 91    Pulse 77 84 85 87 73        Assessment:  Reviewed recent readings. Per 2017 ACC/ AHA HTN guidelines (goal of BP < 130/80), current 30-day average is uncontrolled.     Plan:  Discussed with and instructed patient to increase nifedipine to 60mg, patient confirms understanding.   Instructed patient to increase frequency of monitoring to 2-3x per week.   Patients health , Prema Mcmanus, will be following up as scheduled.   I will continue to monitor regularly and will follow-up in 2 weeks.    Current medication regimen:  Hypertension Medications             NIFEdipine (ADALAT CC) 60 MG TbSR Take 1 tablet (60 mg total) by mouth once daily.        Patient denies having questions or concerns. Patient has my contact information and knows to call with any concerns or clinical changes.

## 2019-03-06 NOTE — PROGRESS NOTES
Last 5 Patient Entered Readings                                      Current 30 Day Average: 134/91     Recent Readings 2/28/2019 2/26/2019 2/19/2019 2/18/2019 1/29/2019    SBP (mmHg) 124 131 138 144 133    DBP (mmHg) 86 87 92 98 89    Pulse 80 89 77 84 85            Digital Medicine: Health  Follow Up    Left voicemail to follow up with Hoang Adama Ortiz.  Current BP average 134/91 mmHg is not at goal, <130/80.

## 2019-03-06 NOTE — PROGRESS NOTES
Last 5 Patient Entered Readings                                      Current 30 Day Average: 134/91     Recent Readings 2/28/2019 2/26/2019 2/19/2019 2/18/2019 1/29/2019    SBP (mmHg) 124 131 138 144 133    DBP (mmHg) 86 87 92 98 89    Pulse 80 89 77 84 85          Digital Medicine: Health  Follow Up    Lifestyle Modifications:    1.Dietary Modifications (Sodium intake <2,000mg/day, food labels, dining out): Patient continues working on her sodium intake. She has finally cut johnson completely out of her diet and she is working to find lower sodium breakfast items (we reviewed this along with sticking to the serving size for the items she chooses). Her and her  are doing much better with reading food labels and making brand swaps. She still struggles sometimes at work when she does not have enough left overs from home to bring so she is working on finding options to bring with her. I will continue working on this with her.     2.Physical Activity: Deferred    3.Medication Therapy: Patient has been compliant with the medication regimen. Patient stated that she is doing well so far on her current BP medication regimen and adjustment. She did mention that she has noticed that she is a little more tired after taking the medication but she is going to give it some time to see how her body adjusts to it.     4.Patient has the following medication side effects/concerns:   (Frequency/Alleviating factors/Precipitating factors, etc.)     Follow up with Mrs. Adama Ortiz completed. No further questions or concerns. Will continue to follow up to achieve health goals.

## 2019-03-07 ENCOUNTER — PATIENT MESSAGE (OUTPATIENT)
Dept: ADMINISTRATIVE | Facility: OTHER | Age: 44
End: 2019-03-07

## 2019-03-11 ENCOUNTER — PATIENT OUTREACH (OUTPATIENT)
Dept: OTHER | Facility: OTHER | Age: 44
End: 2019-03-11

## 2019-03-11 NOTE — PROGRESS NOTES
HPI:  Patient called back, confirms she is tolerating increased dose in nifedipine. Patient reports adherence to medication regimen daily and denies missed doses. Patient denies hypotensive s/sx (lightheadedness, dizziness, nausea, fatigue); patient denies hypertensive s/sx (SOB, CP, severe headaches, changes in vision, dizziness, fatigue, confusion, anxiety, nosebleeds).    Patient attributes dietary indiscretions (boiled seafood on 3/8) to elevated readings.     Patient states she still currently undergoing fertility treatment. Patient reports an egg retrieval should be completed within the next month.    Last 5 Patient Entered Readings                                      Current 30 Day Average: 131/89     Recent Readings 3/9/2019 3/8/2019 2/28/2019 2/26/2019 2/19/2019    SBP (mmHg) 133 114 124 131 138    DBP (mmHg) 92 76 86 87 92    Pulse 102 107 80 89 77        Assessment:  Reviewed recent readings. Per 2017 ACC/ AHA HTN guidelines (goal of BP < 130/80), current 30-day average is slightly uncontrolled.     Plan:  Continue current medication regimen.   Patients health , Prema Mcmanus, will be following up as scheduled.   I will continue to monitor regularly and will follow-up in 4 weeks, sooner if blood pressure begins to trend upward or downward.     Current medication regimen:  Hypertension Medications             NIFEdipine (ADALAT CC) 60 MG TbSR Take 1 tablet (60 mg total) by mouth once daily.         Patient denies having questions or concerns. Patient has my contact information and knows to call with any concerns or clinical changes.

## 2019-03-11 NOTE — PROGRESS NOTES
Last 5 Patient Entered Readings                                      Current 30 Day Average: 131/89     Recent Readings 3/9/2019 3/8/2019 2/28/2019 2/26/2019 2/19/2019    SBP (mmHg) 133 114 124 131 138    DBP (mmHg) 92 76 86 87 92    Pulse 102 107 80 89 77        Hypertension Medications             NIFEdipine (ADALAT CC) 60 MG TbSR Take 1 tablet (60 mg total) by mouth once daily.        Plan:   Called patient to follow up since increasing nifedipine to 60mg. Reviewed BP readings. Per 2017 ACC/ AHA HTN guidelines  (goal of BP < 130/80), current 30-day average is uncontrolled, but does appear to be improving due to recent dose increase.  LVM, requested patient call back at his and her convenience.  Will continue to monitor. WCB in 2 weeks.

## 2019-04-04 ENCOUNTER — PATIENT OUTREACH (OUTPATIENT)
Dept: OTHER | Facility: OTHER | Age: 44
End: 2019-04-04

## 2019-04-04 NOTE — PROGRESS NOTES
"Last 5 Patient Entered Readings                                      Current 30 Day Average: 126/84     Recent Readings 3/19/2019 3/16/2019 3/9/2019 3/8/2019 2/28/2019    SBP (mmHg) 134 122 133 114 124    DBP (mmHg) 92 76 92 76 86    Pulse 89 86 102 107 80          Digital Medicine: Health  Follow Up    Lifestyle Modifications:    1.Dietary Modifications (Sodium intake <2,000mg/day, food labels, dining out): Patient continues monitoring her sodium intake. She has switched to no-salted [employer] and she is enjoying it. She asked about the Hello Fresh style meals to help her do better with portion size for her meals. I advised her that this would be a good option as long as she does not add the salt or salted seasonings to the meals she receives. Patient also requested alternatives for low sodium soy sauces. She realized that the "low sodium" version she has is still high in salt. I will send her a low sodium soy sauce recipe via email per patient request.    2.Physical Activity: Deferred     3.Medication Therapy: Patient has been compliant with the medication regimen. Patient would like to speak with her PharmD, MICHAEL Krause, about her current BP medication regimen. She is taking a break from the infertility treatments so she would like to see if she can switch back to her old BP medication in order to help eliminate the symptoms she is having (swelling in her ankles).     4.Patient has the following medication side effects/concerns:   (Frequency/Alleviating factors/Precipitating factors, etc.)     Patient has been under more stress because she is caring for her mother in law after a surgery she had 2 weeks ago. The recovery is going to be longer then they expected so she will be staying with them for 2-4 more weeks. Patient requested to go on hiatus for 4 weeks just until she gets through all of this.   Patient knows she needs to work on submitting more BP readings each week.      Follow up with Hoang Adama Diana " completed. No further questions or concerns. Will continue to follow up to achieve health goals.

## 2019-04-08 ENCOUNTER — PATIENT OUTREACH (OUTPATIENT)
Dept: OTHER | Facility: OTHER | Age: 44
End: 2019-04-08

## 2019-04-08 DIAGNOSIS — I10 HYPERTENSION, UNSPECIFIED TYPE: Primary | ICD-10-CM

## 2019-04-08 NOTE — PROGRESS NOTES
"Last 5 Patient Entered Readings                                      Current 30 Day Average: 126/85     Recent Readings 4/4/2019 3/19/2019 3/16/2019 3/9/2019 3/8/2019    SBP (mmHg) 115 134 122 133 114    DBP (mmHg) 81 92 76 92 76    Pulse 82 89 86 102 107        Hypertension Medications             NIFEdipine (ADALAT CC) 60 MG TbSR Take 1 tablet (60 mg total) by mouth once daily.        Plan:   Called patient to follow up, reviewed BP readings. Per 2017 ACC/ AHA HTN guidelines  (goal of BP < 130/80), current 30-day average is slightly uncontrolled. Per HC's note from 4/4, "Patient would like to speak with her PharmD, MICHAEL Krause, about her current BP medication regimen. She is taking a break from the infertility treatments so she would like to see if she can switch back to her old BP medication in order to help eliminate the symptoms she is having (swelling in her ankles)."  LVM, requested patient call back at her convenience.  Will continue to monitor. WCB in 2 weeks.     "

## 2019-04-29 ENCOUNTER — OFFICE VISIT (OUTPATIENT)
Dept: FAMILY MEDICINE | Facility: CLINIC | Age: 44
End: 2019-04-29
Payer: COMMERCIAL

## 2019-04-29 VITALS
SYSTOLIC BLOOD PRESSURE: 146 MMHG | HEART RATE: 71 BPM | WEIGHT: 270.31 LBS | HEIGHT: 62 IN | TEMPERATURE: 98 F | OXYGEN SATURATION: 98 % | BODY MASS INDEX: 49.74 KG/M2 | RESPIRATION RATE: 18 BRPM | DIASTOLIC BLOOD PRESSURE: 100 MMHG

## 2019-04-29 DIAGNOSIS — I10 ESSENTIAL HYPERTENSION: ICD-10-CM

## 2019-04-29 DIAGNOSIS — Z00.00 ANNUAL PHYSICAL EXAM: Primary | ICD-10-CM

## 2019-04-29 DIAGNOSIS — Z12.31 ENCOUNTER FOR SCREENING MAMMOGRAM FOR BREAST CANCER: ICD-10-CM

## 2019-04-29 PROCEDURE — 3077F SYST BP >= 140 MM HG: CPT | Mod: CPTII,S$GLB,, | Performed by: INTERNAL MEDICINE

## 2019-04-29 PROCEDURE — 3077F PR MOST RECENT SYSTOLIC BLOOD PRESSURE >= 140 MM HG: ICD-10-PCS | Mod: CPTII,S$GLB,, | Performed by: INTERNAL MEDICINE

## 2019-04-29 PROCEDURE — 99999 PR PBB SHADOW E&M-EST. PATIENT-LVL III: CPT | Mod: PBBFAC,,, | Performed by: INTERNAL MEDICINE

## 2019-04-29 PROCEDURE — 99999 PR PBB SHADOW E&M-EST. PATIENT-LVL III: ICD-10-PCS | Mod: PBBFAC,,, | Performed by: INTERNAL MEDICINE

## 2019-04-29 PROCEDURE — 3080F DIAST BP >= 90 MM HG: CPT | Mod: CPTII,S$GLB,, | Performed by: INTERNAL MEDICINE

## 2019-04-29 PROCEDURE — 3080F PR MOST RECENT DIASTOLIC BLOOD PRESSURE >= 90 MM HG: ICD-10-PCS | Mod: CPTII,S$GLB,, | Performed by: INTERNAL MEDICINE

## 2019-04-29 PROCEDURE — 99396 PR PREVENTIVE VISIT,EST,40-64: ICD-10-PCS | Mod: S$GLB,,, | Performed by: INTERNAL MEDICINE

## 2019-04-29 PROCEDURE — 99396 PREV VISIT EST AGE 40-64: CPT | Mod: S$GLB,,, | Performed by: INTERNAL MEDICINE

## 2019-04-29 NOTE — PROGRESS NOTES
SUBJECTIVE     Chief Complaint   Patient presents with    Annual Exam     bld work       HPI  Adama Ortiz is a 43 y.o. female with multiple medical diagnoses as listed in the medical history and problem list that presents for annual exam. Pt has been doing well since her last visit, but she is still battling issues with her BP and fertility issues. She has a good appetite and eats well on occasion. She does not exercise. She sleeps for ~4 hours nightly. Pt does not take OTC supplements. She does have any current stressors, but does not have an outlet to help deal with stress at this time. Pt is UTD on age appropriate CA screening.    PAST MEDICAL HISTORY:  Past Medical History:   Diagnosis Date    Hypertension        PAST SURGICAL HISTORY:  Past Surgical History:   Procedure Laterality Date    APPENDECTOMY      BREAST SURGERY      DILATION AND CURETTAGE OF UTERUS      TOTAL REDUCTION MAMMOPLASTY Bilateral 2006       SOCIAL HISTORY:  Social History     Socioeconomic History    Marital status:      Spouse name: Not on file    Number of children: Not on file    Years of education: Not on file    Highest education level: Not on file   Occupational History    Not on file   Social Needs    Financial resource strain: Not on file    Food insecurity:     Worry: Not on file     Inability: Not on file    Transportation needs:     Medical: Not on file     Non-medical: Not on file   Tobacco Use    Smoking status: Never Smoker    Smokeless tobacco: Never Used   Substance and Sexual Activity    Alcohol use: No     Alcohol/week: 0.0 oz     Comment: occ    Drug use: No    Sexual activity: Yes     Partners: Male     Birth control/protection: None   Lifestyle    Physical activity:     Days per week: Not on file     Minutes per session: Not on file    Stress: Not on file   Relationships    Social connections:     Talks on phone: Not on file     Gets together: Not on file     Attends Synagogue service:  "Not on file     Active member of club or organization: Not on file     Attends meetings of clubs or organizations: Not on file     Relationship status: Not on file   Other Topics Concern    Not on file   Social History Narrative    Not on file       FAMILY HISTORY:  Family History   Problem Relation Age of Onset    Hypertension Mother     Hyperlipidemia Mother        ALLERGIES AND MEDICATIONS: updated and reviewed.  Review of patient's allergies indicates:   Allergen Reactions    Aspirin Swelling    Keflex [cephalexin] Swelling and Rash     Current Outpatient Medications   Medication Sig Dispense Refill    azelastine (ASTELIN) 137 mcg (0.1 %) nasal spray 1 spray (137 mcg total) by Nasal route 2 (two) times daily. 30 mL 0    levocetirizine (XYZAL) 5 MG tablet Take 1 tablet (5 mg total) by mouth every evening. 30 tablet 11    NIFEdipine (ADALAT CC) 60 MG TbSR Take 1 tablet (60 mg total) by mouth once daily. 30 tablet 11     No current facility-administered medications for this visit.        ROS  Review of Systems   Constitutional: Negative for chills and fever.   HENT: Negative for hearing loss and sore throat.    Eyes: Negative for visual disturbance.   Respiratory: Negative for cough and shortness of breath.    Cardiovascular: Negative for chest pain, palpitations and leg swelling.   Gastrointestinal: Negative for abdominal pain, constipation, diarrhea, nausea and vomiting.   Genitourinary: Negative for dysuria, frequency and urgency.   Musculoskeletal: Positive for arthralgias. Negative for joint swelling and myalgias.   Skin: Negative for rash and wound.   Neurological: Negative for headaches.   Psychiatric/Behavioral: Negative for agitation and confusion. The patient is not nervous/anxious.          OBJECTIVE     Physical Exam  Vitals:    04/29/19 1545   BP: (!) 146/100   Pulse: 71   Resp: 18   Temp: 98.2 °F (36.8 °C)    Body mass index is 49.44 kg/m².  Weight: 122.6 kg (270 lb 4.5 oz)   Height: 5' 2" " (157.5 cm)     Physical Exam   Constitutional: She is oriented to person, place, and time. She appears well-developed and well-nourished. No distress.   HENT:   Head: Normocephalic and atraumatic.   Right Ear: External ear normal.   Left Ear: External ear normal.   Nose: Nose normal.   Mouth/Throat: Oropharynx is clear and moist.   Eyes: Conjunctivae and EOM are normal. Right eye exhibits no discharge. Left eye exhibits no discharge. No scleral icterus.   Neck: Normal range of motion. Neck supple. No JVD present. No tracheal deviation present.   Cardiovascular: Normal rate, regular rhythm and intact distal pulses. Exam reveals no gallop and no friction rub.   No murmur heard.  Pulmonary/Chest: Effort normal and breath sounds normal. No respiratory distress. She has no wheezes.   Abdominal: Soft. Bowel sounds are normal. She exhibits no distension and no mass. There is no tenderness. There is no rebound and no guarding.   Musculoskeletal: Normal range of motion. She exhibits no edema, tenderness or deformity.   Neurological: She is alert and oriented to person, place, and time. She exhibits normal muscle tone. Coordination normal.   Skin: Skin is warm and dry. No rash noted. No erythema.   Psychiatric: She has a normal mood and affect. Her behavior is normal. Judgment and thought content normal.         Health Maintenance       Date Due Completion Date    TETANUS VACCINE 07/30/1993 ---    Influenza Vaccine 08/01/2018 ---    Mammogram 05/14/2020 5/14/2018    Pap Smear with HPV Cotest 10/05/2021 10/5/2018            ASSESSMENT     43 y.o. female with     1. Annual physical exam    2. Essential hypertension    3. BMI 45.0-49.9, adult    4. Encounter for screening mammogram for breast cancer        PLAN:     1. Annual physical exam  - Counseled on age appropriate medical preventative services, including age appropriate cancer screenings, over all nutritional health, need for a consistent exercise regimen and an over all  push towards maintaining a vigorous and active lifestyle.  Counseled on age appropriate vaccines and discussed upcoming health care needs based on age/gender.  Spent time with patient counseling on need for a good patient/doctor relationship moving forward.  Discussed use of common OTC medications and supplements.  Discussed common dietary aids and use of caffeine and the need for good sleep hygiene and stress management.  - Pt to have standing labs already ordered per PCP    2. Essential hypertension  - BP elevated above goal of <140/90; likely 2/2 non-compliance with BP meds  - Pt advised to resume BP meds as previously ordered and resume digital HTN monitoring  - Monitor    3. BMI 45.0-49.9, adult  - Discussed importance of eating a prudent diet and exercising    4. Encounter for screening mammogram for breast cancer  - Mammo Digital Screening Bilateral With CAD; Future        RTC in 3 months     Trudi Sanchez MD  04/29/2019 4:00 PM        No follow-ups on file.

## 2019-05-06 NOTE — PROGRESS NOTES
"Last 5 Patient Entered Readings                                      Current 30 Day Average: 137/100     Recent Readings 4/8/2019 4/4/2019 3/19/2019 3/16/2019 3/9/2019    SBP (mmHg) 137 115 134 122 133    DBP (mmHg) 100 81 92 76 92    Pulse 79 82 89 86 102        Hypertension Medications             NIFEdipine (ADALAT CC) 60 MG TbSR Take 1 tablet (60 mg total) by mouth once daily.          Plan:   Called patient to follow up, reviewed BP readings. Per 2017 ACC/ AHA HTN guidelines  (goal of BP < 130/80), current 30-day average is slightly uncontrolled.   Patient was seen by IM on 4/29, BP was 146/100. Per note, "BP elevated above goal of <140/90; likely 2/2 non-compliance with BP meds - Pt advised to resume BP meds as previously ordered and resume digital HTN monitoring."  LVM, 2nd attempt, requested patient call back at her convenience and to resume at least weekly readings.  Will continue to monitor. WCB in 2 weeks.         "

## 2019-05-08 ENCOUNTER — PATIENT OUTREACH (OUTPATIENT)
Dept: OTHER | Facility: OTHER | Age: 44
End: 2019-05-08

## 2019-05-08 NOTE — PROGRESS NOTES
Last 5 Patient Entered Readings                                      Current 30 Day Average: 137/100     Recent Readings 4/8/2019 4/4/2019 3/19/2019 3/16/2019 3/9/2019    SBP (mmHg) 137 115 134 122 133    DBP (mmHg) 100 81 92 76 92    Pulse 79 82 89 86 102            Digital Medicine: Health  Follow Up    Sent [employer] message to follow up with Mrs. Adama Ortiz.  Current BP average 137/100 mmHg is not at goal, <130/80.  Requesting more BP readings.

## 2019-05-15 ENCOUNTER — HOSPITAL ENCOUNTER (OUTPATIENT)
Dept: RADIOLOGY | Facility: HOSPITAL | Age: 44
Discharge: HOME | End: 2019-05-15
Attending: INTERNAL MEDICINE
Payer: COMMERCIAL

## 2019-05-15 VITALS — BODY MASS INDEX: 49.69 KG/M2 | WEIGHT: 270 LBS | HEIGHT: 62 IN

## 2019-05-15 DIAGNOSIS — Z12.31 ENCOUNTER FOR SCREENING MAMMOGRAM FOR BREAST CANCER: ICD-10-CM

## 2019-05-15 PROCEDURE — 77067 MAMMO DIGITAL SCREENING BILAT WITH TOMOSYNTHESIS_CAD: ICD-10-PCS | Mod: 26,,, | Performed by: RADIOLOGY

## 2019-05-15 PROCEDURE — 77067 SCR MAMMO BI INCL CAD: CPT | Mod: TC

## 2019-05-15 PROCEDURE — 77063 BREAST TOMOSYNTHESIS BI: CPT | Mod: 26,,, | Performed by: RADIOLOGY

## 2019-05-15 PROCEDURE — 77063 MAMMO DIGITAL SCREENING BILAT WITH TOMOSYNTHESIS_CAD: ICD-10-PCS | Mod: 26,,, | Performed by: RADIOLOGY

## 2019-05-15 PROCEDURE — 77067 SCR MAMMO BI INCL CAD: CPT | Mod: 26,,, | Performed by: RADIOLOGY

## 2019-05-20 RX ORDER — CHLORTHALIDONE 25 MG/1
25 TABLET ORAL DAILY
Qty: 30 TABLET | Refills: 11 | Status: SHIPPED | OUTPATIENT
Start: 2019-05-20 | End: 2019-11-18 | Stop reason: SDUPTHER

## 2019-05-20 NOTE — PROGRESS NOTES
HPI:  Called patient to follow up. Patient reports adherence to medication regimen daily and denies missed doses. Patient denies hypotensive s/sx (lightheadedness, dizziness, nausea, fatigue); patient denies hypertensive s/sx (SOB, CP, severe headaches, changes in vision, dizziness, fatigue, confusion, anxiety, nosebleeds).    Last 5 Patient Entered Readings                                      Current 30 Day Average: 139/92     Recent Readings 5/19/2019 5/16/2019 5/14/2019 5/13/2019 4/8/2019    SBP (mmHg) 130 150 146 131 137    DBP (mmHg) 86 94 96 92 100    Pulse 83 80 81 94 79        Assessment:  Reviewed recent readings. Per 2017 ACC/ AHA HTN guidelines (goal of BP < 130/80), current 30-day average is uncontrolled.     Plan:  Discussed with and instructed patient to restart chlorthalidone 25, patient confirms understanding. Will order CMP.   Encouraged patient to charge digital cuff at least monthly.  Patients health , Prema Mcmanus, will be following up as scheduled.   I will continue to monitor regularly and will follow-up in 2 weeks, sooner if blood pressure begins to trend upward or downward.     Current medication regimen:  Hypertension Medications             chlorthalidone (HYGROTEN) 25 MG Tab Take 1 tablet (25 mg total) by mouth once daily.    NIFEdipine (ADALAT CC) 60 MG TbSR Take 1 tablet (60 mg total) by mouth once daily.        Patient denies having questions or concerns. Patient has my contact information and knows to call with any concerns or clinical changes.

## 2019-05-21 ENCOUNTER — TELEPHONE (OUTPATIENT)
Dept: RADIOLOGY | Facility: HOSPITAL | Age: 44
End: 2019-05-21

## 2019-05-22 ENCOUNTER — HOSPITAL ENCOUNTER (EMERGENCY)
Facility: HOSPITAL | Age: 44
Discharge: HOME | End: 2019-05-22
Attending: EMERGENCY MEDICINE
Payer: COMMERCIAL

## 2019-05-22 VITALS
BODY MASS INDEX: 48.47 KG/M2 | HEART RATE: 95 BPM | SYSTOLIC BLOOD PRESSURE: 140 MMHG | TEMPERATURE: 99 F | DIASTOLIC BLOOD PRESSURE: 84 MMHG | WEIGHT: 265 LBS | OXYGEN SATURATION: 100 % | RESPIRATION RATE: 18 BRPM

## 2019-05-22 DIAGNOSIS — K52.9 GASTROENTERITIS: Primary | ICD-10-CM

## 2019-05-22 LAB
ALBUMIN SERPL-MCNC: 3.7 G/DL (ref 3.3–5.5)
ALBUMIN SERPL-MCNC: 3.8 G/DL (ref 3.3–5.5)
ALP SERPL-CCNC: 60 U/L (ref 42–141)
ALP SERPL-CCNC: 65 U/L (ref 42–141)
B-HCG UR QL: NEGATIVE
BASOPHILS # BLD AUTO: 0.01 K/UL (ref 0–0.2)
BASOPHILS NFR BLD: 0.1 % (ref 0–1.9)
BILIRUB SERPL-MCNC: 1.3 MG/DL (ref 0.2–1.6)
BILIRUB SERPL-MCNC: 1.5 MG/DL (ref 0.2–1.6)
BILIRUBIN, POC UA: NEGATIVE
BLOOD, POC UA: ABNORMAL
BUN SERPL-MCNC: 11 MG/DL (ref 7–22)
CALCIUM SERPL-MCNC: 9.5 MG/DL (ref 8–10.3)
CHLORIDE SERPL-SCNC: 105 MMOL/L (ref 98–108)
CLARITY, POC UA: CLEAR
COLOR, POC UA: YELLOW
CREAT SERPL-MCNC: 0.9 MG/DL (ref 0.6–1.2)
CTP QC/QA: YES
DIFFERENTIAL METHOD: ABNORMAL
EOSINOPHIL # BLD AUTO: 0 K/UL (ref 0–0.5)
EOSINOPHIL NFR BLD: 0.2 % (ref 0–8)
ERYTHROCYTE [DISTWIDTH] IN BLOOD BY AUTOMATED COUNT: 13.6 % (ref 11.5–14.5)
GLUCOSE SERPL-MCNC: 104 MG/DL (ref 73–118)
GLUCOSE, POC UA: NEGATIVE
HCT VFR BLD AUTO: 43.6 % (ref 37–48.5)
HGB BLD-MCNC: 14.7 G/DL (ref 12–16)
KETONES, POC UA: NEGATIVE
LEUKOCYTE EST, POC UA: NEGATIVE
LYMPHOCYTES # BLD AUTO: 0.5 K/UL (ref 1–4.8)
LYMPHOCYTES NFR BLD: 3.9 % (ref 18–48)
MCH RBC QN AUTO: 29.8 PG (ref 27–31)
MCHC RBC AUTO-ENTMCNC: 33.7 G/DL (ref 32–36)
MCV RBC AUTO: 88 FL (ref 82–98)
MONOCYTES # BLD AUTO: 0.4 K/UL (ref 0.3–1)
MONOCYTES NFR BLD: 3 % (ref 4–15)
NEUTROPHILS # BLD AUTO: 11.5 K/UL (ref 1.8–7.7)
NEUTROPHILS NFR BLD: 92.6 % (ref 38–73)
NITRITE, POC UA: NEGATIVE
PH UR STRIP: 5.5 [PH]
PLATELET # BLD AUTO: 319 K/UL (ref 150–350)
PMV BLD AUTO: 10.4 FL (ref 9.2–12.9)
POC ALT (SGPT): 25 U/L (ref 10–47)
POC ALT (SGPT): 27 U/L (ref 10–47)
POC AMYLASE: 46 U/L (ref 14–97)
POC AST (SGOT): 33 U/L (ref 11–38)
POC AST (SGOT): 34 U/L (ref 11–38)
POC GGT: 20 U/L (ref 5–65)
POC TCO2: 26 MMOL/L (ref 18–33)
POTASSIUM BLD-SCNC: 5 MMOL/L (ref 3.6–5.1)
PROTEIN, POC UA: ABNORMAL
PROTEIN, POC: 7.9 G/DL (ref 6.4–8.1)
PROTEIN, POC: 8 G/DL (ref 6.4–8.1)
RBC # BLD AUTO: 4.94 M/UL (ref 4–5.4)
SODIUM BLD-SCNC: 143 MMOL/L (ref 128–145)
SPECIFIC GRAVITY, POC UA: >=1.03
UROBILINOGEN, POC UA: 0.2 E.U./DL
WBC # BLD AUTO: 12.44 K/UL (ref 3.9–12.7)

## 2019-05-22 PROCEDURE — 63600175 PHARM REV CODE 636 W HCPCS: Mod: ER | Performed by: NURSE PRACTITIONER

## 2019-05-22 PROCEDURE — 96361 HYDRATE IV INFUSION ADD-ON: CPT | Mod: ER

## 2019-05-22 PROCEDURE — 81025 URINE PREGNANCY TEST: CPT | Mod: ER | Performed by: EMERGENCY MEDICINE

## 2019-05-22 PROCEDURE — 80053 COMPREHEN METABOLIC PANEL: CPT | Mod: ER

## 2019-05-22 PROCEDURE — 85025 COMPLETE CBC W/AUTO DIFF WBC: CPT | Mod: ER

## 2019-05-22 PROCEDURE — 25000003 PHARM REV CODE 250: Mod: ER | Performed by: NURSE PRACTITIONER

## 2019-05-22 PROCEDURE — 96374 THER/PROPH/DIAG INJ IV PUSH: CPT | Mod: 54,ER

## 2019-05-22 PROCEDURE — 82150 ASSAY OF AMYLASE: CPT | Mod: ER

## 2019-05-22 PROCEDURE — 96372 THER/PROPH/DIAG INJ SC/IM: CPT | Mod: ER

## 2019-05-22 PROCEDURE — 99284 EMERGENCY DEPT VISIT MOD MDM: CPT | Mod: 25,ER

## 2019-05-22 PROCEDURE — 81003 URINALYSIS AUTO W/O SCOPE: CPT | Mod: ER

## 2019-05-22 PROCEDURE — 85025 COMPLETE CBC W/AUTO DIFF WBC: CPT

## 2019-05-22 RX ORDER — DICYCLOMINE HYDROCHLORIDE 10 MG/ML
20 INJECTION INTRAMUSCULAR
Status: COMPLETED | OUTPATIENT
Start: 2019-05-22 | End: 2019-05-22

## 2019-05-22 RX ORDER — ONDANSETRON 2 MG/ML
8 INJECTION INTRAMUSCULAR; INTRAVENOUS ONCE
Status: COMPLETED | OUTPATIENT
Start: 2019-05-22 | End: 2019-05-22

## 2019-05-22 RX ORDER — DICYCLOMINE HYDROCHLORIDE 20 MG/1
20 TABLET ORAL EVERY 6 HOURS PRN
Qty: 28 TABLET | Refills: 0 | Status: SHIPPED | OUTPATIENT
Start: 2019-05-22 | End: 2019-06-21

## 2019-05-22 RX ORDER — ONDANSETRON 8 MG/1
8 TABLET, ORALLY DISINTEGRATING ORAL EVERY 8 HOURS PRN
Qty: 30 TABLET | Refills: 0 | Status: SHIPPED | OUTPATIENT
Start: 2019-05-22 | End: 2019-11-18

## 2019-05-22 RX ADMIN — SODIUM CHLORIDE 1000 ML: 0.9 INJECTION, SOLUTION INTRAVENOUS at 05:05

## 2019-05-22 RX ADMIN — ONDANSETRON 8 MG: 2 INJECTION INTRAMUSCULAR; INTRAVENOUS at 06:05

## 2019-05-22 RX ADMIN — DICYCLOMINE HYDROCHLORIDE 20 MG: 20 INJECTION, SOLUTION INTRAMUSCULAR at 05:05

## 2019-05-22 NOTE — ED PROVIDER NOTES
"Encounter Date: 5/22/2019    SCRIBE #1 NOTE: I, Lauraeduardo Rea, am scribing for, and in the presence of,  Ramona Newman NP. I have scribed the following portions of the note - Other sections scribed: HPI,ROS,PE .       History     Chief Complaint   Patient presents with    Abdominal Pain     Pt states," Stomach pain since this morning and diarrhea."     42 y/o/f with a hx of HTN presents with N/V/D and abdominal cramping that started at 830 am today. She states every other hour she is having episodes of diarrhea, 4 episodes of vomiting. No recent abx use. No recent travel outside of the country. No sick contacts.  Patient states she ate leftover Chinese food on yesterday but states her  ate the same and he is not having any symptoms. Denies fever or blood in stool.    The history is provided by the patient. No  was used.   Emesis    This is a new problem. The current episode started several hours ago. The problem occurs 2 - 4 times per day. The problem has been unchanged. Associated symptoms include abdominal pain and diarrhea. Pertinent negatives include no chills and no fever.     Review of patient's allergies indicates:   Allergen Reactions    Aspirin Swelling    Keflex [cephalexin] Swelling and Rash     Past Medical History:   Diagnosis Date    Hypertension      Past Surgical History:   Procedure Laterality Date    APPENDECTOMY      BREAST SURGERY      DILATION AND CURETTAGE OF UTERUS      TOTAL REDUCTION MAMMOPLASTY Bilateral 2006     Family History   Problem Relation Age of Onset    Hypertension Mother     Hyperlipidemia Mother      Social History     Tobacco Use    Smoking status: Never Smoker    Smokeless tobacco: Never Used   Substance Use Topics    Alcohol use: No     Alcohol/week: 0.0 oz     Comment: occ    Drug use: No     Review of Systems   Constitutional: Negative.  Negative for chills and fever.   HENT: Negative.  Negative for sore throat.    Eyes: Negative.  "   Respiratory: Negative.  Negative for shortness of breath.    Cardiovascular: Negative.  Negative for chest pain.   Gastrointestinal: Positive for abdominal pain, diarrhea and vomiting. Negative for blood in stool and nausea.   Endocrine: Negative.    Genitourinary: Negative.  Negative for dysuria.   Musculoskeletal: Negative.  Negative for back pain.   Skin: Negative.  Negative for rash.   Allergic/Immunologic: Negative.    Neurological: Negative.  Negative for weakness.   Hematological: Negative.  Does not bruise/bleed easily.   Psychiatric/Behavioral: Negative.    All other systems reviewed and are negative.      Physical Exam     Initial Vitals [05/22/19 1657]   BP Pulse Resp Temp SpO2   (!) 140/84 105 16 98 °F (36.7 °C) 100 %      MAP       --         Physical Exam    Nursing note and vitals reviewed.  Constitutional: She appears well-developed.   HENT:   Right Ear: External ear normal.   Left Ear: External ear normal.   Mouth/Throat: Oropharynx is clear and moist.   Eyes: Conjunctivae are normal.   Neck: Normal range of motion. Neck supple.   Cardiovascular: Normal rate, regular rhythm, normal heart sounds and intact distal pulses.   No murmur heard.  Pulmonary/Chest: Effort normal and breath sounds normal.   Abdominal: Soft. Bowel sounds are normal. There is no tenderness. There is no guarding.   Musculoskeletal: Normal range of motion. She exhibits no edema or tenderness.   Neurological: She is alert and oriented to person, place, and time.   Skin: Skin is warm and dry. No rash noted.   Psychiatric: She has a normal mood and affect.         ED Course   Procedures  Labs Reviewed   POCT URINALYSIS W/O SCOPE - Abnormal; Notable for the following components:       Result Value    Glucose, UA Negative (*)     Bilirubin, UA Negative (*)     Ketones, UA Negative (*)     Spec Grav UA >=1.030 (*)     Blood, UA 1+ (*)     Protein, UA Trace (*)     Nitrite, UA Negative (*)     Leukocytes, UA Negative (*)     All other  components within normal limits   CBC W/ AUTO DIFFERENTIAL   POCT URINE PREGNANCY   POCT CBC   POCT URINALYSIS W/O SCOPE   POCT CMP   POCT LIVER PANEL   POCT CMP   POCT AMYLASE          Imaging Results    None          Medical Decision Making:   Initial Assessment:   44 y/o/f with a hx of HTN presents with N/V/D and abdominal cramping that started at 830 am today. She states every other hour she is having episodes of diarrhea, 4 episodes of vomiting. No recent abx use. No recent travel outside of the country. No sick contacts.  Patient states she ate leftover Chinese food on yesterday but states her  ate the same and he is not having any symptoms.     Differential Diagnosis:   Gastroenteritis, dehydration, gastritis  Clinical Tests:   Lab Tests: Ordered and Reviewed       <> Summary of Lab: CMP-sodium 143, potassium 5.0, bicarb 26, glucose 104, BUN 11, creatinine 0.9, amylase 46  CBC- WBC 12.5, hemoglobin 14.6, hematocrit 42.7, platelets 310  ED Management:  Patient given 1 L normal saline.  Medicated with Zofran 8 mg IV and Bentyl 20 mg IM.  Nausea and abdominal cramps improved.  Oral fluid challenge tolerated well.  1915 Upon reassessment. Abdominal exam with no tenderness or guarding.  Patient states that nausea and abdominal cramps was resolved.   Discharge home with Zofran and Bentyl as needed.  Follow-up with PCP in 1-2 days.  Return ED for worsening of symptoms.            Scribe Attestation:   Scribe #1: I performed the above scribed service and the documentation accurately describes the services I performed. I attest to the accuracy of the note.    This document was produced by a scribe under my direction and in my presence. I agree with the content of the note and have made any necessary edits.     NELL Ramos    05/22/2019 8:48 PM           Clinical Impression:     1. Gastroenteritis                                   NELL Mullen  05/22/19 2048

## 2019-05-23 ENCOUNTER — OFFICE VISIT (OUTPATIENT)
Dept: FAMILY MEDICINE | Facility: CLINIC | Age: 44
End: 2019-05-23
Payer: COMMERCIAL

## 2019-05-23 VITALS
OXYGEN SATURATION: 98 % | SYSTOLIC BLOOD PRESSURE: 110 MMHG | TEMPERATURE: 98 F | WEIGHT: 267.44 LBS | RESPIRATION RATE: 16 BRPM | HEART RATE: 76 BPM | BODY MASS INDEX: 49.21 KG/M2 | DIASTOLIC BLOOD PRESSURE: 80 MMHG | HEIGHT: 62 IN

## 2019-05-23 DIAGNOSIS — K52.9 GASTROENTERITIS: Primary | ICD-10-CM

## 2019-05-23 PROCEDURE — 3074F PR MOST RECENT SYSTOLIC BLOOD PRESSURE < 130 MM HG: ICD-10-PCS | Mod: CPTII,S$GLB,, | Performed by: PHYSICIAN ASSISTANT

## 2019-05-23 PROCEDURE — 99999 PR PBB SHADOW E&M-EST. PATIENT-LVL IV: ICD-10-PCS | Mod: PBBFAC,,, | Performed by: PHYSICIAN ASSISTANT

## 2019-05-23 PROCEDURE — 99999 PR PBB SHADOW E&M-EST. PATIENT-LVL IV: CPT | Mod: PBBFAC,,, | Performed by: PHYSICIAN ASSISTANT

## 2019-05-23 PROCEDURE — 3079F PR MOST RECENT DIASTOLIC BLOOD PRESSURE 80-89 MM HG: ICD-10-PCS | Mod: CPTII,S$GLB,, | Performed by: PHYSICIAN ASSISTANT

## 2019-05-23 PROCEDURE — 3008F PR BODY MASS INDEX (BMI) DOCUMENTED: ICD-10-PCS | Mod: CPTII,S$GLB,, | Performed by: PHYSICIAN ASSISTANT

## 2019-05-23 PROCEDURE — 3008F BODY MASS INDEX DOCD: CPT | Mod: CPTII,S$GLB,, | Performed by: PHYSICIAN ASSISTANT

## 2019-05-23 PROCEDURE — 99213 PR OFFICE/OUTPT VISIT, EST, LEVL III, 20-29 MIN: ICD-10-PCS | Mod: S$GLB,,, | Performed by: PHYSICIAN ASSISTANT

## 2019-05-23 PROCEDURE — 99213 OFFICE O/P EST LOW 20 MIN: CPT | Mod: S$GLB,,, | Performed by: PHYSICIAN ASSISTANT

## 2019-05-23 PROCEDURE — 3079F DIAST BP 80-89 MM HG: CPT | Mod: CPTII,S$GLB,, | Performed by: PHYSICIAN ASSISTANT

## 2019-05-23 PROCEDURE — 3074F SYST BP LT 130 MM HG: CPT | Mod: CPTII,S$GLB,, | Performed by: PHYSICIAN ASSISTANT

## 2019-05-23 NOTE — PATIENT INSTRUCTIONS
"  [place] Diet  Your healthcare provider may recommend a bland diet if you have an upset stomach. It consists of foods that are mild and easy to digest. It is better to eat small frequent meals rather than 3 large meals a day.    Beverages  OK: Fruit juices, non-caffeinated teas and coffee, non-carbonated evangelista  Avoid: Carbonated beverage, caffeinated tea and coffee, all alcoholic beverages  Bread  OK: Refined white, wheat or rye bread, michael or soda crackers, Arti toast, plain rolls, bagels  Avoid: Whole-grain bread  Cereal  OK: Refined cereals: cooked or ready to eat  Avoid: Whole-grain cereals and granola, or those containing bran, seeds or nuts  Desserts  OK: Peanut butter and all others except those to "avoid"  Avoid: Chocolate, cocoa, coconut, popcorn, nuts, seeds, jam, marmalade  Fruits  OK: Canned, cooked, frozen or fresh fruits without seeds or tough skin  Avoid: Olives, skin and seeds of fruit  Meats  OK: All fresh or preserved meat, fish and fowl  Avoid: Any that are prepared with those spices to "avoid"  Cheese and eggs  OK: Eggs, cottage cheese, cream cheese, other cheeses  Avoid: All cheeses made with those spices to "avoid"  Potatoes and pasta  OK: Potato, rice, macaroni, noodles, spaghetti  Avoid: None  Soups  OK: All soups without heavy seasoning  Avoid: Soups made with those spices to "avoid"  Vegetables  OK: Canned, cooked, fresh or frozen mildly flavored vegetables without seeds, skins or coarse fiber  Avoid: Vegetables prepared with those spices to "avoid"; skin and seeds of vegetables and those with coarse fiber  Spices  OK: Salt, lemon and lime juice, vinegar, all extracts, aleisha, cinnamon, thyme, mace, allspice, paprika  Avoid: Chili powder, cloves, pepper, seed spices, garlic, gravy pickles, highly seasoned salad dressings  Date Last Reviewed: 11/20/2015  © 0177-6625 [email or web address]. [address] [place] [address]. All rights reserved. This information is not intended as " a substitute for professional medical care. Always follow your healthcare professional's instructions.

## 2019-05-23 NOTE — PROGRESS NOTES
Subjective:       Patient ID: Adama Ortiz is a 43 y.o. female with multiple medical diagnoses as listed in the medical history and problem list that presents for GI Problem (ed followup)  .    Chief Complaint: GI Problem (ed followup)      GI Problem   The primary symptoms include abdominal pain, nausea, diarrhea (twice today; does not wake up out of sleep ) and myalgias. Primary symptoms do not include fever, fatigue, vomiting (4x yesterday none today ), dysuria or arthralgias. The illness began yesterday. The problem has been gradually improving.   Myalgias began yesterday. The myalgias are generalized. The myalgias are not associated with weakness.   The illness does not include chills or constipation.     Review of Systems   Constitutional: Negative for activity change, appetite change, chills, fatigue, fever and unexpected weight change.   HENT: Negative for hearing loss, rhinorrhea and trouble swallowing.    Eyes: Negative for discharge and visual disturbance.   Respiratory: Negative for chest tightness and wheezing.    Cardiovascular: Negative for chest pain and palpitations.   Gastrointestinal: Positive for abdominal pain, diarrhea (twice today; does not wake up out of sleep ) and nausea. Negative for blood in stool, constipation and vomiting (4x yesterday none today ).   Endocrine: Negative for polydipsia and polyuria.   Genitourinary: Negative for difficulty urinating, dysuria, hematuria and menstrual problem.   Musculoskeletal: Positive for myalgias. Negative for arthralgias, joint swelling and neck pain.   Neurological: Negative for dizziness, weakness, light-headedness and headaches.   Psychiatric/Behavioral: Negative for confusion and dysphoric mood.         PAST MEDICAL HISTORY:  Past Medical History:   Diagnosis Date    Hypertension        SOCIAL HISTORY:  Social History     Socioeconomic History    Marital status:      Spouse name: Not on file    Number of children: Not on file     Years of education: Not on file    Highest education level: Not on file   Occupational History    Not on file   Social Needs    Financial resource strain: Not on file    Food insecurity:     Worry: Not on file     Inability: Not on file    Transportation needs:     Medical: No     Non-medical: No   Tobacco Use    Smoking status: Never Smoker    Smokeless tobacco: Never Used   Substance and Sexual Activity    Alcohol use: No     Alcohol/week: 0.0 oz     Frequency: Monthly or less     Drinks per session: 1 or 2     Binge frequency: Never     Comment: occ    Drug use: No    Sexual activity: Yes     Partners: Male     Birth control/protection: None   Lifestyle    Physical activity:     Days per week: 0 days     Minutes per session: Not on file    Stress: To some extent   Relationships    Social connections:     Talks on phone: More than three times a week     Gets together: Patient refused     Attends Evangelical service: Not on file     Active member of club or organization: No     Attends meetings of clubs or organizations: Never     Relationship status:    Other Topics Concern    Not on file   Social History Narrative    Not on file       ALLERGIES AND MEDICATIONS: updated and reviewed.  Review of patient's allergies indicates:   Allergen Reactions    Aspirin Swelling    Keflex [cephalexin] Swelling and Rash     Current Outpatient Medications   Medication Sig Dispense Refill    dicyclomine (BENTYL) 20 mg tablet Take 1 tablet (20 mg total) by mouth every 6 (six) hours as needed. 28 tablet 0    azelastine (ASTELIN) 137 mcg (0.1 %) nasal spray 1 spray (137 mcg total) by Nasal route 2 (two) times daily. 30 mL 0    chlorthalidone (HYGROTEN) 25 MG Tab Take 1 tablet (25 mg total) by mouth once daily. 30 tablet 11    levocetirizine (XYZAL) 5 MG tablet Take 1 tablet (5 mg total) by mouth every evening. 30 tablet 11    NIFEdipine (ADALAT CC) 60 MG TbSR Take 1 tablet (60 mg total) by mouth once daily.  "30 tablet 11    ondansetron (ZOFRAN-ODT) 8 MG TbDL Take 1 tablet (8 mg total) by mouth every 8 (eight) hours as needed (nausea). 30 tablet 0     No current facility-administered medications for this visit.          Objective:   /80   Pulse 76   Temp 97.9 °F (36.6 °C) (Oral)   Resp 16   Ht 5' 2" (1.575 m)   Wt 121.3 kg (267 lb 6.7 oz)   LMP 05/03/2019   SpO2 98%   BMI 48.91 kg/m²      Physical Exam   Constitutional: She is oriented to person, place, and time. No distress.   HENT:   Head: Normocephalic and atraumatic.   Cardiovascular: Normal rate and regular rhythm.   Pulmonary/Chest: Effort normal and breath sounds normal.   Abdominal: Soft. Bowel sounds are normal. There is no tenderness.   Neurological: She is alert and oriented to person, place, and time.   Skin: Skin is warm. No erythema.           Assessment:       1. Gastroenteritis        Plan:       Gastroenteritis    The current medical regimen is effective;  continue present plan and medications.  Improving  Continue BRAT diet        No follow-ups on file.  "

## 2019-05-23 NOTE — PROGRESS NOTES
Answers for HPI/ROS submitted by the patient on 5/23/2019   activity change: No  unexpected weight change: No  neck pain: No  hearing loss: No  rhinorrhea: No  trouble swallowing: No  eye discharge: No  visual disturbance: No  chest tightness: No  wheezing: No  chest pain: No  palpitations: No  blood in stool: No  constipation: No  vomiting: No  diarrhea: Yes  polydipsia: No  polyuria: No  difficulty urinating: No  hematuria: No  menstrual problem: No  dysuria: No  joint swelling: No  arthralgias: No  headaches: No  weakness: No  confusion: No  dysphoric mood: No

## 2019-05-23 NOTE — DISCHARGE INSTRUCTIONS
Follow-up with PCP in 1-2 days.  Return ED for worsening of symptoms.  Clear liquid diet for 24 hr and advance as tolerated.

## 2019-05-24 ENCOUNTER — PATIENT MESSAGE (OUTPATIENT)
Dept: FAMILY MEDICINE | Facility: CLINIC | Age: 44
End: 2019-05-24

## 2019-05-24 DIAGNOSIS — I10 ESSENTIAL HYPERTENSION: Primary | ICD-10-CM

## 2019-05-28 ENCOUNTER — TELEPHONE (OUTPATIENT)
Dept: RADIOLOGY | Facility: HOSPITAL | Age: 44
End: 2019-05-28

## 2019-05-28 RX ORDER — AMLODIPINE BESYLATE 10 MG/1
10 TABLET ORAL DAILY
Qty: 90 TABLET | Refills: 3 | Status: SHIPPED | OUTPATIENT
Start: 2019-05-28 | End: 2020-10-07 | Stop reason: ALTCHOICE

## 2019-06-03 ENCOUNTER — PATIENT OUTREACH (OUTPATIENT)
Dept: OTHER | Facility: OTHER | Age: 44
End: 2019-06-03

## 2019-06-03 NOTE — PROGRESS NOTES
Last 5 Patient Entered Readings                                      Current 30 Day Average: 130/88     Recent Readings 5/28/2019 5/26/2019 5/26/2019 5/24/2019 5/24/2019    SBP (mmHg) 128 114 121 112 117    DBP (mmHg) 89 82 81 85 82    Pulse 95 92 97 68 65        Hypertension Medications             amLODIPine (NORVASC) 10 MG tablet Take 1 tablet (10 mg total) by mouth once daily.    chlorthalidone (HYGROTEN) 25 MG Tab Take 1 tablet (25 mg total) by mouth once daily.        Called patient to follow up since resuming chlorthalidone 25 (5/20). Since we spoke last, patient reached out to PCP requesting to be changed from nifedipine back to amlodipine due to JOE. Patient was seen by the Urgent Care clinic on 5/23, BP was 110/80. CMP on 5/22 WNL.  Reviewed BP readings. Per 2017 ACC/ AHA HTN guidelines  (goal of BP < 130/80), current 30-day average is slightly uncontrolled, but appears to be trending down.  LVM, requested patient call back at her convenience.  Will continue to monitor. WCB in 2 weeks.

## 2019-06-04 ENCOUNTER — TELEPHONE (OUTPATIENT)
Dept: RADIOLOGY | Facility: HOSPITAL | Age: 44
End: 2019-06-04

## 2019-06-10 ENCOUNTER — TELEPHONE (OUTPATIENT)
Dept: RADIOLOGY | Facility: HOSPITAL | Age: 44
End: 2019-06-10

## 2019-06-10 NOTE — PROGRESS NOTES
Last 5 Patient Entered Readings                                      Current 30 Day Average: 130/88     Recent Readings 5/28/2019 5/26/2019 5/26/2019 5/24/2019 5/24/2019    SBP (mmHg) 128 114 121 112 117    DBP (mmHg) 89 82 81 85 82    Pulse 95 92 97 68 65            Digital Medicine: Health  Follow Up    Sent [employer] message to follow up with Mrs. Adama Ortiz. (No VM at this time).  Current BP average 130/88 mmHg is not at goal, <130/80.  Requesting more BP readings.

## 2019-06-17 ENCOUNTER — TELEPHONE (OUTPATIENT)
Dept: RADIOLOGY | Facility: HOSPITAL | Age: 44
End: 2019-06-17

## 2019-06-17 NOTE — PROGRESS NOTES
HPI:  Called patient to follow up since resuming chlorthalidone 25 (5/20), patient confirms she is tolerating well.  Since we spoke last, patient reached out to PCP requesting to be changed from nifedipine back to amlodipine due to JOE. Patient confirms JOE and headaches have improved.    Patient reports adherence to medication regimen daily and denies missed doses. Patient denies hypotensive s/sx (lightheadedness, dizziness, nausea, fatigue); patient denies hypertensive s/sx (SOB, CP, severe headaches, changes in vision, dizziness, fatigue, confusion, anxiety, nosebleeds).     Patient attributes dietary indiscretions to elevated BP.     Last 5 Patient Entered Readings                                      Current 30 Day Average: 123/87     Recent Readings 6/11/2019 5/28/2019 5/26/2019 5/26/2019 5/24/2019    SBP (mmHg) 125 128 114 121 112    DBP (mmHg) 93 89 82 81 85    Pulse 82 95 92 97 68     Assessment:  Reviewed recent readings. Per 2017 ACC/ AHA HTN guidelines (goal of BP < 130/80), current 30-day average is slightly uncontrolled. Patient was seen by the Urgent Care clinic on 5/23, BP was 110/80. CMP on 5/22 WNL.    Plan:  Offered to adjust medication, patient declines changes today; therefore, continue current medication regimen.   Encouraged patient to limit salt intake to less than 2000mg per day.   Patients health , Prema Mcmanus, will be following up as scheduled.   I will continue to monitor regularly and will follow-up in 4 weeks, sooner if blood pressure begins to trend upward or downward. If DBP remains elevated, will discuss either adding spironolactone or an ARB.    Current medication regimen:  Hypertension Medications             amLODIPine (NORVASC) 10 MG tablet Take 1 tablet (10 mg total) by mouth once daily.    chlorthalidone (HYGROTEN) 25 MG Tab Take 1 tablet (25 mg total) by mouth once daily.         Patient denies having questions or concerns. Patient has my contact information and  knows to call with any concerns or clinical changes.

## 2019-06-17 NOTE — TELEPHONE ENCOUNTER
Left Multiple messages for patient to schedule for Diag mammo and breast US.  Sent patient a certified letter to ask her to call to make her appointment.

## 2019-06-27 NOTE — PROGRESS NOTES
Last 5 Patient Entered Readings                                      Current 30 Day Average: 127/91     Recent Readings 6/11/2019 5/28/2019 5/26/2019 5/26/2019 5/24/2019    SBP (mmHg) 125 128 114 121 112    DBP (mmHg) 93 89 82 81 85    Pulse 82 95 92 97 68          Digital Medicine: Health  Follow Up    Lifestyle Modifications:    1.Dietary Modifications (Sodium intake <2,000mg/day, food labels, dining out): Deferred    2.Physical Activity: Deferred    3.Medication Therapy: Patient has been compliant with the medication regimen.    4.Patient has the following medication side effects/concerns: None  (Frequency/Alleviating factors/Precipitating factors, etc.)     Patient did not have a lot of time to talk to day. She has been very busy and overwhelmed since her  [place]ed [place] this month (he will be there until September). She informed me that she will try to focus on submitting more BP readings each week and she will reach out with any questions or concerns.     Follow up with Mrs. Galan Diana completed. No further questions or concerns. Will continue to follow up to achieve health goals.

## 2019-07-15 ENCOUNTER — PATIENT OUTREACH (OUTPATIENT)
Dept: OTHER | Facility: OTHER | Age: 44
End: 2019-07-15

## 2019-07-15 NOTE — PROGRESS NOTES
Last 5 Patient Entered Readings                                      Current 30 Day Average: 119/86     Recent Readings 7/9/2019 7/3/2019 6/11/2019 5/28/2019 5/26/2019    SBP (mmHg) 111 126 125 128 114    DBP (mmHg) 87 85 93 89 82    Pulse 94 78 82 95 92        Hypertension Medications             amLODIPine (NORVASC) 10 MG tablet Take 1 tablet (10 mg total) by mouth once daily.    chlorthalidone (HYGROTEN) 25 MG Tab Take 1 tablet (25 mg total) by mouth once daily.        Called patient to follow up, reviewed BP readings. Per 2017 ACC/ AHA HTN guidelines  (goal of BP < 130/80), current 30-day average is slightly uncontrolled; however, patient has only taken 2 readings within the past month.  No answer, no VM.  Will continue to monitor. WCB in 1 month.

## 2019-08-01 ENCOUNTER — PATIENT OUTREACH (OUTPATIENT)
Dept: OTHER | Facility: OTHER | Age: 44
End: 2019-08-01

## 2019-08-01 NOTE — PROGRESS NOTES
Last 5 Patient Entered Readings                                      Current 30 Day Average: 124/89     Recent Readings 7/15/2019 7/9/2019 7/3/2019 6/11/2019 5/28/2019    SBP (mmHg) 134 111 126 125 128    DBP (mmHg) 96 87 85 93 89    Pulse 80 94 78 82 95            Digital Medicine: Health  Follow Up    Attempted to leave a voicemail to follow up with Mrs. Adama Ortiz but her VM box was not set up.  Current BP average 124/89 mmHg is not at goal, <130/80.  Will continue to check in.

## 2019-08-12 NOTE — PROGRESS NOTES
Last 5 Patient Entered Readings                                      Current 30 Day Average: 134/96     Recent Readings 7/15/2019 7/9/2019 7/3/2019 6/11/2019 5/28/2019    SBP (mmHg) 134 111 126 125 128    DBP (mmHg) 96 87 85 93 89    Pulse 80 94 78 82 95        Hypertension Medications             amLODIPine (NORVASC) 10 MG tablet Take 1 tablet (10 mg total) by mouth once daily.    chlorthalidone (HYGROTEN) 25 MG Tab Take 1 tablet (25 mg total) by mouth once daily.        8/12- No readings since 7/15. Will request HC to discuss lack of readings during next encounter. Will continue to monitor. Will follow up in 6 weeks.     9/23- No readings since 7/15. Will request HC to discuss lack of readings during next encounter. Will continue to monitor. Will follow up in 6 weeks.     10/29- No readings since 7/15. Will request HC to discuss lack of readings during next encounter. Will continue to monitor. Will follow up in 6 weeks.

## 2019-08-13 NOTE — PROGRESS NOTES
Last 5 Patient Entered Readings                                      Current 30 Day Average: 134/96     Recent Readings 7/15/2019 7/9/2019 7/3/2019 6/11/2019 5/28/2019    SBP (mmHg) 134 111 126 125 128    DBP (mmHg) 96 87 85 93 89    Pulse 80 94 78 82 95            Digital Medicine: Health  Follow Up    Sent [employer] message to follow up with Mrs. Adama Ortiz.  Current BP average 134/96 mmHg is not at goal, <130/80.  Requesting more BP readings.

## 2019-09-26 LAB — HM MAMMOGRAM: NEGATIVE

## 2019-11-18 ENCOUNTER — OFFICE VISIT (OUTPATIENT)
Dept: FAMILY MEDICINE | Facility: CLINIC | Age: 44
End: 2019-11-18
Payer: OTHER GOVERNMENT

## 2019-11-18 VITALS
HEIGHT: 62 IN | TEMPERATURE: 98 F | DIASTOLIC BLOOD PRESSURE: 100 MMHG | SYSTOLIC BLOOD PRESSURE: 150 MMHG | BODY MASS INDEX: 49.49 KG/M2 | HEART RATE: 87 BPM | WEIGHT: 268.94 LBS | OXYGEN SATURATION: 98 %

## 2019-11-18 DIAGNOSIS — I10 ESSENTIAL HYPERTENSION: Primary | ICD-10-CM

## 2019-11-18 DIAGNOSIS — G90.9 AUTONOMIC DYSFUNCTION: ICD-10-CM

## 2019-11-18 DIAGNOSIS — I10 HYPERTENSION, UNSPECIFIED TYPE: ICD-10-CM

## 2019-11-18 DIAGNOSIS — M79.10 MYALGIA: ICD-10-CM

## 2019-11-18 DIAGNOSIS — R73.03 PREDIABETES: ICD-10-CM

## 2019-11-18 PROCEDURE — 99999 PR PBB SHADOW E&M-EST. PATIENT-LVL III: CPT | Mod: PBBFAC,,, | Performed by: FAMILY MEDICINE

## 2019-11-18 PROCEDURE — 99215 OFFICE O/P EST HI 40 MIN: CPT | Mod: S$PBB,,, | Performed by: FAMILY MEDICINE

## 2019-11-18 PROCEDURE — 99215 PR OFFICE/OUTPT VISIT, EST, LEVL V, 40-54 MIN: ICD-10-PCS | Mod: S$PBB,,, | Performed by: FAMILY MEDICINE

## 2019-11-18 PROCEDURE — 99999 PR PBB SHADOW E&M-EST. PATIENT-LVL III: ICD-10-PCS | Mod: PBBFAC,,, | Performed by: FAMILY MEDICINE

## 2019-11-18 PROCEDURE — 99213 OFFICE O/P EST LOW 20 MIN: CPT | Mod: PBBFAC,PO | Performed by: FAMILY MEDICINE

## 2019-11-18 RX ORDER — CHLORTHALIDONE 25 MG/1
25 TABLET ORAL DAILY
Qty: 90 TABLET | Refills: 3 | Status: SHIPPED | OUTPATIENT
Start: 2019-11-18 | End: 2020-10-07 | Stop reason: ALTCHOICE

## 2019-11-18 RX ORDER — DULOXETIN HYDROCHLORIDE 20 MG/1
20 CAPSULE, DELAYED RELEASE ORAL DAILY
Qty: 30 CAPSULE | Refills: 2 | Status: SHIPPED | OUTPATIENT
Start: 2019-11-18 | End: 2020-10-07 | Stop reason: ALTCHOICE

## 2019-11-18 RX ORDER — TIZANIDINE 4 MG/1
4-16 TABLET ORAL NIGHTLY PRN
Qty: 120 TABLET | Refills: 0 | Status: SHIPPED | OUTPATIENT
Start: 2019-11-18 | End: 2019-12-18

## 2019-11-18 NOTE — PROGRESS NOTES
Chief Complaint   Patient presents with    Arm Pain     left     Foot Pain     right        SUBJECTIVE:  Adama Ortiz is a 44 y.o. female here for new problem of complex medical issues with waxing and waining symptoms over the last 2 years at least, still with the ? Of infertility and getitng pregnant, stress and with migrating arthritis and muscle pains, as well as bloating, postural vertigo,, near fainting and abdominal issues.  She has had some palpitations, some SoB at times with anxiety and she has dpressive symptoms at time.  She is just overall frustrated..  Currently has co-morbidities including per problem list.      Past Medical History:   Diagnosis Date    Hypertension      Past Surgical History:   Procedure Laterality Date    APPENDECTOMY      BREAST SURGERY      DILATION AND CURETTAGE OF UTERUS      TOTAL REDUCTION MAMMOPLASTY Bilateral 2006     Social History     Socioeconomic History    Marital status:      Spouse name: Not on file    Number of children: Not on file    Years of education: Not on file    Highest education level: Not on file   Occupational History    Not on file   Social Needs    Financial resource strain: Not on file    Food insecurity:     Worry: Not on file     Inability: Not on file    Transportation needs:     Medical: No     Non-medical: No   Tobacco Use    Smoking status: Never Smoker    Smokeless tobacco: Never Used   Substance and Sexual Activity    Alcohol use: No     Alcohol/week: 0.0 standard drinks     Frequency: Monthly or less     Drinks per session: 1 or 2     Binge frequency: Never     Comment: occ    Drug use: No    Sexual activity: Yes     Partners: Male     Birth control/protection: None   Lifestyle    Physical activity:     Days per week: 0 days     Minutes per session: Not on file    Stress: To some extent   Relationships    Social connections:     Talks on phone: More than three times a week     Gets together: Patient refused      "Attends Denominational service: Not on file     Active member of club or organization: No     Attends meetings of clubs or organizations: Never     Relationship status:    Other Topics Concern    Not on file   Social History Narrative    Not on file     Family History   Problem Relation Age of Onset    Hypertension Mother     Hyperlipidemia Mother      Current Outpatient Medications on File Prior to Visit   Medication Sig Dispense Refill    amLODIPine (NORVASC) 10 MG tablet Take 1 tablet (10 mg total) by mouth once daily. 90 tablet 3    azelastine (ASTELIN) 137 mcg (0.1 %) nasal spray 1 spray (137 mcg total) by Nasal route 2 (two) times daily. 30 mL 0    levocetirizine (XYZAL) 5 MG tablet Take 1 tablet (5 mg total) by mouth every evening. 30 tablet 11     No current facility-administered medications on file prior to visit.      Review of patient's allergies indicates:   Allergen Reactions    Aspirin Swelling    Keflex [cephalexin] Swelling and Rash         Review of Systems   Constitutional: Positive for malaise/fatigue. Negative for chills and fever.   HENT: Negative for hearing loss.    Gastrointestinal: Positive for heartburn.   Musculoskeletal: Negative for falls.   Neurological: Positive for tingling.   Psychiatric/Behavioral: Positive for memory loss. Negative for suicidal ideas. The patient is nervous/anxious and has insomnia.        OBJECTIVE:  BP (!) 150/100   Pulse 87   Temp 97.7 °F (36.5 °C) (Oral)   Ht 5' 2" (1.575 m)   Wt 122 kg (268 lb 15.4 oz)   LMP 11/06/2019   SpO2 98%   BMI 49.19 kg/m²     Wt Readings from Last 3 Encounters:   11/18/19 122 kg (268 lb 15.4 oz)   05/23/19 121.3 kg (267 lb 6.7 oz)   05/22/19 120.2 kg (265 lb)     Wt Readings from Last 15 Encounters:   11/18/19 122 kg (268 lb 15.4 oz)   05/23/19 121.3 kg (267 lb 6.7 oz)   05/22/19 120.2 kg (265 lb)   05/15/19 122.5 kg (270 lb)   04/29/19 122.6 kg (270 lb 4.5 oz)   01/23/19 120.5 kg (265 lb 10.5 oz)   01/02/19 118 kg " (260 lb 2.3 oz)   12/04/18 121 kg (266 lb 12.1 oz)   10/05/18 118.2 kg (260 lb 11.1 oz)   04/09/18 119 kg (262 lb 5.6 oz)   12/21/17 117.9 kg (260 lb)   12/15/17 114.8 kg (253 lb)   09/18/17 115 kg (253 lb 8.5 oz)   05/01/17 116.6 kg (257 lb 0.9 oz)   01/31/17 118 kg (260 lb 2.3 oz)       BP Readings from Last 3 Encounters:   11/18/19 (!) 150/100   05/23/19 110/80   05/22/19 (!) 140/84       She appears well, in no apparent distress.  Alert and oriented times three, pleasant and cooperative. Vital signs are as documented in vital signs section.  Signs of chronic dehydration:  Thinning hair  Facial changes in the center, with dryness and benign skin lesions.  Ear dryness  Dentition changes and dry mouth/tongue  Increased skin turgor  Abdominal bloating  Leg edema not in heart failure or kidney failure  No lymphadenopathy in the anterior or posterior neck, supraclavicular, axillary or inguinal areas. No hepato-splenomegaly noted.      Review of old Records:  Reviewed per [facility]    Review of old labs:  Lab Results   Component Value Date    TSH 2.733 05/15/2019     Lab Results   Component Value Date    WBC 12.44 05/22/2019    HGB 14.7 05/22/2019    HCT 43.6 05/22/2019    MCV 88 05/22/2019     05/22/2019       Chemistry        Component Value Date/Time     05/15/2019 0725    K 3.8 05/15/2019 0725     05/15/2019 0725    CO2 27 05/15/2019 0725    BUN 12 05/15/2019 0725    CREATININE 0.9 05/15/2019 0725    GLU 93 05/15/2019 0725        Component Value Date/Time    CALCIUM 9.5 05/15/2019 0725    ALKPHOS 64 05/15/2019 0725    AST 29 05/15/2019 0725    ALT 23 05/15/2019 0725    BILITOT 0.7 05/15/2019 0725    ESTGFRAFRICA >60 05/15/2019 0725    EGFRNONAA >60 05/15/2019 0725        Lab Results   Component Value Date    CHOL 186 05/15/2019    CHOL 182 04/09/2018    CHOL 194 08/10/2016     Lab Results   Component Value Date    HDL 55 05/15/2019    HDL 52 04/09/2018    HDL 59 08/10/2016     Lab Results   Component  Value Date    LDLCALC 98.6 05/15/2019    LDLCALC 110.6 04/09/2018    LDLCALC 113.2 08/10/2016     Lab Results   Component Value Date    TRIG 162 (H) 05/15/2019    TRIG 97 04/09/2018    TRIG 109 08/10/2016     Lab Results   Component Value Date    CHOLHDL 29.6 05/15/2019    CHOLHDL 28.6 04/09/2018    CHOLHDL 30.4 08/10/2016         Review of old imaging:  Reviewed per epic    ASSESSMENT:  Problem List Items Addressed This Visit     Prediabetes    Essential hypertension - Primary    Relevant Medications    chlorthalidone (HYGROTEN) 25 MG Tab    BMI 45.0-49.9, adult      Other Visit Diagnoses     Myalgia        Relevant Medications    DULoxetine (CYMBALTA) 20 MG capsule    tiZANidine (ZANAFLEX) 4 MG tablet    Hypertension, unspecified type        Relevant Medications    chlorthalidone (HYGROTEN) 25 MG Tab          ICD-10-CM ICD-9-CM   1. Essential hypertension I10 401.9   2. BMI 45.0-49.9, adult Z68.42 V85.42   3. Prediabetes R73.03 790.29   4. Myalgia M79.10 729.1   5. Hypertension, unspecified type I10 401.9         PLAN:  1. Essential hypertension  The current medical regimen is effective;  continue present plan and medications.  Focus on fluid management  Could be a form of POTS  Would want to still control fluids and be sure she is enjoying it    2. BMI 45.0-49.9, adult  The patient is asked to make an attempt to improve diet and exercise patterns to aid in medical management of this problem.      3. Prediabetes  Continue to work on all of this    4. Myalgia  Potential medication side effects were discussed with the patient; let me know if any occur.  The current medical regimen is effective;  continue present plan and medications.    - DULoxetine (CYMBALTA) 20 MG capsule; Take 1 capsule (20 mg total) by mouth once daily.  Dispense: 30 capsule; Refill: 2  - tiZANidine (ZANAFLEX) 4 MG tablet; Take 1-4 tablets (4-16 mg total) by mouth nightly as needed.  Dispense: 120 tablet; Refill: 0    5. Hypertension,  unspecified type  Uncontrolled  Give 4 weeks to get on track  - chlorthalidone (HYGROTEN) 25 MG Tab; Take 1 tablet (25 mg total) by mouth once daily.  Dispense: 90 tablet; Refill: 3    Spent >40 (42) minutes with the patient with 1/2 time in face to face counseling about the above.    Medication List with Changes/Refills   New Medications    DULOXETINE (CYMBALTA) 20 MG CAPSULE    Take 1 capsule (20 mg total) by mouth once daily.    TIZANIDINE (ZANAFLEX) 4 MG TABLET    Take 1-4 tablets (4-16 mg total) by mouth nightly as needed.   Current Medications    AMLODIPINE (NORVASC) 10 MG TABLET    Take 1 tablet (10 mg total) by mouth once daily.    AZELASTINE (ASTELIN) 137 MCG (0.1 %) NASAL SPRAY    1 spray (137 mcg total) by Nasal route 2 (two) times daily.    LEVOCETIRIZINE (XYZAL) 5 MG TABLET    Take 1 tablet (5 mg total) by mouth every evening.   Changed and/or Refilled Medications    Modified Medication Previous Medication    CHLORTHALIDONE (HYGROTEN) 25 MG TAB chlorthalidone (HYGROTEN) 25 MG Tab       Take 1 tablet (25 mg total) by mouth once daily.    Take 1 tablet (25 mg total) by mouth once daily.   Discontinued Medications    ONDANSETRON (ZOFRAN-ODT) 8 MG TBDL    Take 1 tablet (8 mg total) by mouth every 8 (eight) hours as needed (nausea).       No follow-ups on file.

## 2019-11-18 NOTE — PROGRESS NOTES
Patient has NOT taking b/p medication for two days now, she is scheduled on the Nurse Visit for a two weeks follow up

## 2019-11-22 NOTE — PROGRESS NOTES
"Digital Medicine: Health  Follow-Up    The history is provided by the patient.     Follow Up  Follow-up reason(s): reading review      Readings are missing. Patient stated that she has been having a lot of personal issues going on with numerous people in her family passing away back to back. She admits to getting "completely off track". She met with Dr. Prather on Monday so she is really trying to focus more on her health. With the guidance from Dr. Prather, she is working to limit her fluid intake and increase her food intake. She will reach out to him with more specific details.    Patient wants to start taking her readings again and participate in the program but realistically, she does not think she will be able to start doing this until January. She requested hiatus until then. She will reach out if anything changes in the meantime.       Intervention/Plan    There are no preventive care reminders to display for this patient.    Last 5 Patient Entered Readings                                      Current 30 Day Average:      Recent Readings 7/15/2019 7/9/2019 7/3/2019 6/11/2019 5/28/2019    SBP (mmHg) 134 111 126 125 128    DBP (mmHg) 96 87 85 93 89    Pulse 80 94 78 82 95                  Screenings    SDOH  "

## 2019-11-23 PROBLEM — G90.9 AUTONOMIC DYSFUNCTION: Status: ACTIVE | Noted: 2019-11-23

## 2019-12-02 ENCOUNTER — CLINICAL SUPPORT (OUTPATIENT)
Dept: FAMILY MEDICINE | Facility: CLINIC | Age: 44
End: 2019-12-02
Payer: OTHER GOVERNMENT

## 2019-12-02 VITALS — SYSTOLIC BLOOD PRESSURE: 136 MMHG | DIASTOLIC BLOOD PRESSURE: 88 MMHG

## 2019-12-02 DIAGNOSIS — I10 ELEVATED BLOOD PRESSURE READING IN OFFICE WITH DIAGNOSIS OF HYPERTENSION: Primary | ICD-10-CM

## 2019-12-02 PROCEDURE — 99999 PR PBB SHADOW E&M-EST. PATIENT-LVL I: ICD-10-PCS | Mod: PBBFAC,,,

## 2019-12-02 PROCEDURE — 99999 PR PBB SHADOW E&M-EST. PATIENT-LVL I: CPT | Mod: PBBFAC,,,

## 2019-12-02 PROCEDURE — 99211 OFF/OP EST MAY X REQ PHY/QHP: CPT | Mod: PBBFAC,PO

## 2019-12-02 PROCEDURE — 99499 UNLISTED E&M SERVICE: CPT | Mod: S$PBB,,, | Performed by: FAMILY MEDICINE

## 2019-12-02 PROCEDURE — 99499 NO LOS: ICD-10-PCS | Mod: S$PBB,,, | Performed by: FAMILY MEDICINE

## 2019-12-02 NOTE — PROGRESS NOTES
Adama Ortiz 44 y.o. female is here today for Blood Pressure check.   History of HTN yes.    Review of patient's allergies indicates:   Allergen Reactions    Aspirin Swelling    Keflex [cephalexin] Swelling and Rash     Creatinine   Date Value Ref Range Status   05/15/2019 0.9 0.5 - 1.4 mg/dL Final     Sodium   Date Value Ref Range Status   05/15/2019 138 136 - 145 mmol/L Final     Potassium   Date Value Ref Range Status   05/15/2019 3.8 3.5 - 5.1 mmol/L Final   ]  Patient denies taking blood pressure medications on a regular basis at the same time of the day.     Current Outpatient Medications:     amLODIPine (NORVASC) 10 MG tablet, Take 1 tablet (10 mg total) by mouth once daily., Disp: 90 tablet, Rfl: 3    azelastine (ASTELIN) 137 mcg (0.1 %) nasal spray, 1 spray (137 mcg total) by Nasal route 2 (two) times daily., Disp: 30 mL, Rfl: 0    chlorthalidone (HYGROTEN) 25 MG Tab, Take 1 tablet (25 mg total) by mouth once daily., Disp: 90 tablet, Rfl: 3    DULoxetine (CYMBALTA) 20 MG capsule, Take 1 capsule (20 mg total) by mouth once daily., Disp: 30 capsule, Rfl: 2    levocetirizine (XYZAL) 5 MG tablet, Take 1 tablet (5 mg total) by mouth every evening., Disp: 30 tablet, Rfl: 11    tiZANidine (ZANAFLEX) 4 MG tablet, Take 1-4 tablets (4-16 mg total) by mouth nightly as needed., Disp: 120 tablet, Rfl: 0  Does patient have record of home blood pressure readings no. Readings have been averaging n/a.   Last dose of blood pressure medication was taken at today 0900.  Patient is asymptomatic.   Complains of n/a.    Vitals:    12/02/19 1012   BP: 136/88   BP Location: Right arm   Patient Position: Sitting   BP Method: Large (Manual)         Dr. Prather informed of nurse visit.

## 2019-12-12 ENCOUNTER — OFFICE VISIT (OUTPATIENT)
Dept: FAMILY MEDICINE | Facility: CLINIC | Age: 44
End: 2019-12-12
Payer: OTHER GOVERNMENT

## 2019-12-12 ENCOUNTER — TELEPHONE (OUTPATIENT)
Dept: FAMILY MEDICINE | Facility: CLINIC | Age: 44
End: 2019-12-12
Payer: OTHER GOVERNMENT

## 2019-12-12 VITALS
DIASTOLIC BLOOD PRESSURE: 70 MMHG | WEIGHT: 268.94 LBS | TEMPERATURE: 98 F | OXYGEN SATURATION: 99 % | HEIGHT: 62 IN | BODY MASS INDEX: 49.49 KG/M2 | SYSTOLIC BLOOD PRESSURE: 138 MMHG | HEART RATE: 85 BPM

## 2019-12-12 DIAGNOSIS — Z23 NEEDS FLU SHOT: Primary | ICD-10-CM

## 2019-12-12 DIAGNOSIS — R73.03 PREDIABETES: ICD-10-CM

## 2019-12-12 DIAGNOSIS — M79.671 INTRACTABLE RIGHT HEEL PAIN: ICD-10-CM

## 2019-12-12 DIAGNOSIS — I10 ESSENTIAL HYPERTENSION: Primary | ICD-10-CM

## 2019-12-12 PROCEDURE — 99214 OFFICE O/P EST MOD 30 MIN: CPT | Mod: S$PBB,,, | Performed by: FAMILY MEDICINE

## 2019-12-12 PROCEDURE — 99999 PR PBB SHADOW E&M-EST. PATIENT-LVL III: CPT | Mod: PBBFAC,,, | Performed by: FAMILY MEDICINE

## 2019-12-12 PROCEDURE — 99214 PR OFFICE/OUTPT VISIT, EST, LEVL IV, 30-39 MIN: ICD-10-PCS | Mod: S$PBB,,, | Performed by: FAMILY MEDICINE

## 2019-12-12 PROCEDURE — 99213 OFFICE O/P EST LOW 20 MIN: CPT | Mod: PBBFAC,PO | Performed by: FAMILY MEDICINE

## 2019-12-12 PROCEDURE — 99999 PR PBB SHADOW E&M-EST. PATIENT-LVL III: ICD-10-PCS | Mod: PBBFAC,,, | Performed by: FAMILY MEDICINE

## 2019-12-12 RX ORDER — DICLOFENAC SODIUM 50 MG/1
50 TABLET, DELAYED RELEASE ORAL 2 TIMES DAILY
Qty: 28 TABLET | Refills: 0 | Status: SHIPPED | OUTPATIENT
Start: 2019-12-12 | End: 2019-12-26

## 2019-12-12 NOTE — PROGRESS NOTES
"Chief Complaint   Patient presents with    Follow-up     SUBJECTIVE:  Adama Ortiz is a 44 y.o. female  The patient is taking hypertensive medications compliantly without side effects.  Denies chest pain, dyspnea, edema, or TIA's.  Right heel pain that is intractable.    OBJECTIVE:  /70   Pulse 85   Temp 98.1 °F (36.7 °C) (Oral)   Ht 5' 2" (1.575 m)   Wt 122 kg (268 lb 15.4 oz)   LMP 12/09/2019   SpO2 99%   BMI 49.19 kg/m²     Wt Readings from Last 15 Encounters:   12/12/19 122 kg (268 lb 15.4 oz)   11/18/19 122 kg (268 lb 15.4 oz)   05/23/19 121.3 kg (267 lb 6.7 oz)   05/22/19 120.2 kg (265 lb)   05/15/19 122.5 kg (270 lb)   04/29/19 122.6 kg (270 lb 4.5 oz)   01/23/19 120.5 kg (265 lb 10.5 oz)   01/02/19 118 kg (260 lb 2.3 oz)   12/04/18 121 kg (266 lb 12.1 oz)   10/05/18 118.2 kg (260 lb 11.1 oz)   04/09/18 119 kg (262 lb 5.6 oz)   12/21/17 117.9 kg (260 lb)   12/15/17 114.8 kg (253 lb)   09/18/17 115 kg (253 lb 8.5 oz)   05/01/17 116.6 kg (257 lb 0.9 oz)       She appears well, in no apparent distress.  Alert and oriented times three, pleasant and cooperative. Vital signs are as documented in vital signs section.    S1 and S2 normal, no murmurs, clicks, gallops or rubs. Regular rate and rhythm. Chest is clear; no wheezes or rales. No edema or JVD.    Right heel pain   ASSESSMENT:  1. Essential hypertension    2. Prediabetes    3. BMI 45.0-49.9, adult      PLAN:  Adama was seen today for follow-up.    Diagnoses and all orders for this visit:    Essential hypertension  Explained importance of blood pressure monitoring either at home or office to make sure it is controlled. Given counseling on need to keep blood pressure at least 140/90 and preferably 120/80 which is normal.  Patient given counseling on signs and symptoms of possible elevated blood pressure and more common co-morbid conditions such as AMI, heart failure, Stroke and kidney failure.  Also discussed need to avoid tobacco, engage in " moderate intensity aerobic exercise and to eat a low sodium high fiber diet.  Patient voiced understanding.    Prediabetes  The current medical regimen is effective;  continue present plan and medications.    BMI 45.0-49.9, adult  The patient is asked to make an attempt to improve diet and exercise patterns to aid in medical management of this problem.    Elbow is improving.

## 2020-01-09 ENCOUNTER — PATIENT OUTREACH (OUTPATIENT)
Dept: OTHER | Facility: OTHER | Age: 45
End: 2020-01-09

## 2020-01-09 NOTE — PROGRESS NOTES
"Last 5 Patient Entered Readings                                      Current 30 Day Average:      Recent Readings 11/24/2019 11/23/2019 7/15/2019 7/9/2019 7/3/2019    SBP (mmHg) 148 144 134 111 126    DBP (mmHg) 91 98 96 87 85    Pulse 77 88 80 94 78        Hypertension Medications             amLODIPine (NORVASC) 10 MG tablet Take 1 tablet (10 mg total) by mouth once daily.    chlorthalidone (HYGROTEN) 25 MG Tab Take 1 tablet (25 mg total) by mouth once daily.        1/9/20- No readings since 11/24/19. Patient was recently placed on hiatus. Will request HC to discuss lack of readings during next encounter. Patient was seen by PCP on 12/12/19, BP was 138/70. Will continue to monitor. Will follow up in 6 weeks.     2/19/20- No readings since 11/24/19. Patient was recently placed on hiatus. Per HC's note on 2/17/20, "Attempting to check in with the patient to see how her transfer went (ungoing fertility treatments). Participation in the program pending this result." Will continue to monitor. Will follow up in 6 weeks.     3/31/20- No readings since 11/24/19. Patient was recently placed on hiatus. Per HC's note on 3/17/20, "Attempting to check in with the patient to see how her transfer went (ungoing fertility treatments). Participation in the program pending this result." Will continue to monitor. Will follow up in 6 weeks.     5/11/20- No readings since 11/24/19. HC last reached out to patient on 4/6/20 regarding program participation. Will continue to monitor. Will follow up in 6 weeks.   "

## 2020-01-24 NOTE — PROGRESS NOTES
Digital Medicine: Health  Follow-Up    The history is provided by the patient.     Follow Up  Follow-up reason(s): reading review      Readings are missing. Patient has not been taking her BP readings because she is getting her readings taken at the fertility doctors office. She has a transfer this past Wednesday so she will be going back to the doctor on 2/3 to see if the transfer worked. Due to the treatments, she has not been taking her BP medication which has cause her readings to be a little higher the usual. I did inform the patient that when she is pregnant that her OB will take over her care and management of her hypertension and then we can start again once she delivers. Patient expressed understanding. I will reach out to her next month to see how every went and to take the next steps that need to be taken.       Intervention/Plan    There are no preventive care reminders to display for this patient.    Last 5 Patient Entered Readings                                      Current 30 Day Average:      Recent Readings 11/24/2019 11/23/2019 7/15/2019 7/9/2019 7/3/2019    SBP (mmHg) 148 144 134 111 126    DBP (mmHg) 91 98 96 87 85    Pulse 77 88 80 94 78                  Screenings    SDOH

## 2020-02-17 ENCOUNTER — PATIENT OUTREACH (OUTPATIENT)
Dept: OTHER | Facility: OTHER | Age: 45
End: 2020-02-17
Payer: OTHER GOVERNMENT

## 2020-06-18 ENCOUNTER — PATIENT OUTREACH (OUTPATIENT)
Dept: OTHER | Facility: OTHER | Age: 45
End: 2020-06-18

## 2020-06-18 NOTE — PROGRESS NOTES
Last 5 Patient Entered Readings                                      Current 30 Day Average:      Recent Readings 11/24/2019 11/23/2019 7/15/2019 7/9/2019 7/3/2019    SBP (mmHg) 148 144 134 111 126    DBP (mmHg) 91 98 96 87 85    Pulse 77 88 80 94 78        Hypertension Medications             amLODIPine (NORVASC) 10 MG tablet Take 1 tablet (10 mg total) by mouth once daily.    chlorthalidone (HYGROTEN) 25 MG Tab Take 1 tablet (25 mg total) by mouth once daily.        No readings since 11/24/19. HC last reached out to patient on 5/20/20 to discuss lack of readings. Will continue to monitor. Will follow up in 6 weeks.

## 2020-07-29 ENCOUNTER — PATIENT OUTREACH (OUTPATIENT)
Dept: OTHER | Facility: OTHER | Age: 45
End: 2020-07-29

## 2020-07-29 NOTE — PROGRESS NOTES
Last 5 Patient Entered Readings                                      Current 30 Day Average:      Recent Readings 11/24/2019 11/23/2019 7/15/2019 7/9/2019 7/3/2019    SBP (mmHg) 148 144 134 111 126    DBP (mmHg) 91 98 96 87 85    Pulse 77 88 80 94 78        Hypertension Medications             amLODIPine (NORVASC) 10 MG tablet Take 1 tablet (10 mg total) by mouth once daily.    chlorthalidone (HYGROTEN) 25 MG Tab Take 1 tablet (25 mg total) by mouth once daily.        7/29/20- No digital readings since 11/24/19. HC last reached out to patient on 7/10/20 to discuss lack of readings. Will continue to monitor. Will follow up in 6 weeks.     9/9/20- Patient has not submitted any recent data for HTN/Diabetes Digital Medicine. Will follow up once more data is available to review.

## 2020-10-07 ENCOUNTER — OFFICE VISIT (OUTPATIENT)
Dept: FAMILY MEDICINE | Facility: CLINIC | Age: 45
End: 2020-10-07
Payer: COMMERCIAL

## 2020-10-07 ENCOUNTER — LAB VISIT (OUTPATIENT)
Dept: LAB | Facility: HOSPITAL | Age: 45
End: 2020-10-07
Attending: FAMILY MEDICINE
Payer: COMMERCIAL

## 2020-10-07 VITALS — DIASTOLIC BLOOD PRESSURE: 86 MMHG | HEART RATE: 72 BPM | SYSTOLIC BLOOD PRESSURE: 128 MMHG

## 2020-10-07 DIAGNOSIS — E66.9 OBESITY, UNSPECIFIED CLASSIFICATION, UNSPECIFIED OBESITY TYPE, UNSPECIFIED WHETHER SERIOUS COMORBIDITY PRESENT: ICD-10-CM

## 2020-10-07 DIAGNOSIS — Z00.00 ANNUAL PHYSICAL EXAM: ICD-10-CM

## 2020-10-07 DIAGNOSIS — I10 ESSENTIAL HYPERTENSION: ICD-10-CM

## 2020-10-07 DIAGNOSIS — Z00.00 ANNUAL PHYSICAL EXAM: Primary | ICD-10-CM

## 2020-10-07 PROBLEM — N97.9 FEMALE INFERTILITY: Status: ACTIVE | Noted: 2020-10-07

## 2020-10-07 LAB
ALBUMIN SERPL BCP-MCNC: 4.4 G/DL (ref 3.5–5.2)
ALP SERPL-CCNC: 67 U/L (ref 55–135)
ALT SERPL W/O P-5'-P-CCNC: 31 U/L (ref 10–44)
ANION GAP SERPL CALC-SCNC: 8 MMOL/L (ref 8–16)
AST SERPL-CCNC: 31 U/L (ref 10–40)
BASOPHILS # BLD AUTO: 0.06 K/UL (ref 0–0.2)
BASOPHILS NFR BLD: 0.8 % (ref 0–1.9)
BILIRUB SERPL-MCNC: 0.7 MG/DL (ref 0.1–1)
BUN SERPL-MCNC: 17 MG/DL (ref 6–20)
CALCIUM SERPL-MCNC: 9.7 MG/DL (ref 8.7–10.5)
CHLORIDE SERPL-SCNC: 105 MMOL/L (ref 95–110)
CHOLEST SERPL-MCNC: 181 MG/DL (ref 120–199)
CHOLEST/HDLC SERPL: 3.5 {RATIO} (ref 2–5)
CO2 SERPL-SCNC: 26 MMOL/L (ref 23–29)
CREAT SERPL-MCNC: 1.1 MG/DL (ref 0.5–1.4)
DIFFERENTIAL METHOD: ABNORMAL
EOSINOPHIL # BLD AUTO: 0.1 K/UL (ref 0–0.5)
EOSINOPHIL NFR BLD: 2 % (ref 0–8)
ERYTHROCYTE [DISTWIDTH] IN BLOOD BY AUTOMATED COUNT: 12.4 % (ref 11.5–14.5)
EST. GFR  (AFRICAN AMERICAN): >60 ML/MIN/1.73 M^2
EST. GFR  (NON AFRICAN AMERICAN): >60 ML/MIN/1.73 M^2
GLUCOSE SERPL-MCNC: 94 MG/DL (ref 70–110)
HCT VFR BLD AUTO: 45.2 % (ref 37–48.5)
HDLC SERPL-MCNC: 51 MG/DL (ref 40–75)
HDLC SERPL: 28.2 % (ref 20–50)
HGB BLD-MCNC: 15.1 G/DL (ref 12–16)
IMM GRANULOCYTES # BLD AUTO: 0.02 K/UL (ref 0–0.04)
IMM GRANULOCYTES NFR BLD AUTO: 0.3 % (ref 0–0.5)
LDLC SERPL CALC-MCNC: 109 MG/DL (ref 63–159)
LYMPHOCYTES # BLD AUTO: 3 K/UL (ref 1–4.8)
LYMPHOCYTES NFR BLD: 42.5 % (ref 18–48)
MCH RBC QN AUTO: 29.4 PG (ref 27–31)
MCHC RBC AUTO-ENTMCNC: 33.4 G/DL (ref 32–36)
MCV RBC AUTO: 88 FL (ref 82–98)
MONOCYTES # BLD AUTO: 0.5 K/UL (ref 0.3–1)
MONOCYTES NFR BLD: 7.6 % (ref 4–15)
NEUTROPHILS # BLD AUTO: 3.3 K/UL (ref 1.8–7.7)
NEUTROPHILS NFR BLD: 46.8 % (ref 38–73)
NONHDLC SERPL-MCNC: 130 MG/DL
NRBC BLD-RTO: 0 /100 WBC
PLATELET # BLD AUTO: 361 K/UL (ref 150–350)
PMV BLD AUTO: 10.9 FL (ref 9.2–12.9)
POTASSIUM SERPL-SCNC: 4.1 MMOL/L (ref 3.5–5.1)
PROT SERPL-MCNC: 8.2 G/DL (ref 6–8.4)
RBC # BLD AUTO: 5.13 M/UL (ref 4–5.4)
SODIUM SERPL-SCNC: 139 MMOL/L (ref 136–145)
TRIGL SERPL-MCNC: 105 MG/DL (ref 30–150)
TSH SERPL DL<=0.005 MIU/L-ACNC: 2.95 UIU/ML (ref 0.4–4)
URATE SERPL-MCNC: 5.9 MG/DL (ref 2.4–5.7)
WBC # BLD AUTO: 7.06 K/UL (ref 3.9–12.7)

## 2020-10-07 PROCEDURE — 3074F PR MOST RECENT SYSTOLIC BLOOD PRESSURE < 130 MM HG: ICD-10-PCS | Mod: CPTII,S$GLB,, | Performed by: FAMILY MEDICINE

## 2020-10-07 PROCEDURE — 99999 PR PBB SHADOW E&M-EST. PATIENT-LVL II: ICD-10-PCS | Mod: PBBFAC,,, | Performed by: FAMILY MEDICINE

## 2020-10-07 PROCEDURE — 90471 FLU VACCINE (QUAD) GREATER THAN OR EQUAL TO 3YO PRESERVATIVE FREE IM: ICD-10-PCS | Mod: S$GLB,,, | Performed by: FAMILY MEDICINE

## 2020-10-07 PROCEDURE — 84443 ASSAY THYROID STIM HORMONE: CPT

## 2020-10-07 PROCEDURE — 86703 HIV-1/HIV-2 1 RESULT ANTBDY: CPT

## 2020-10-07 PROCEDURE — 85025 COMPLETE CBC W/AUTO DIFF WBC: CPT

## 2020-10-07 PROCEDURE — 3079F DIAST BP 80-89 MM HG: CPT | Mod: CPTII,S$GLB,, | Performed by: FAMILY MEDICINE

## 2020-10-07 PROCEDURE — 90471 IMMUNIZATION ADMIN: CPT | Mod: S$GLB,,, | Performed by: FAMILY MEDICINE

## 2020-10-07 PROCEDURE — 90686 FLU VACCINE (QUAD) GREATER THAN OR EQUAL TO 3YO PRESERVATIVE FREE IM: ICD-10-PCS | Mod: S$GLB,,, | Performed by: FAMILY MEDICINE

## 2020-10-07 PROCEDURE — 80053 COMPREHEN METABOLIC PANEL: CPT

## 2020-10-07 PROCEDURE — 3074F SYST BP LT 130 MM HG: CPT | Mod: CPTII,S$GLB,, | Performed by: FAMILY MEDICINE

## 2020-10-07 PROCEDURE — 3079F PR MOST RECENT DIASTOLIC BLOOD PRESSURE 80-89 MM HG: ICD-10-PCS | Mod: CPTII,S$GLB,, | Performed by: FAMILY MEDICINE

## 2020-10-07 PROCEDURE — 90686 IIV4 VACC NO PRSV 0.5 ML IM: CPT | Mod: S$GLB,,, | Performed by: FAMILY MEDICINE

## 2020-10-07 PROCEDURE — 86803 HEPATITIS C AB TEST: CPT

## 2020-10-07 PROCEDURE — 86592 SYPHILIS TEST NON-TREP QUAL: CPT

## 2020-10-07 PROCEDURE — 99396 PR PREVENTIVE VISIT,EST,40-64: ICD-10-PCS | Mod: 25,S$GLB,, | Performed by: FAMILY MEDICINE

## 2020-10-07 PROCEDURE — 99999 PR PBB SHADOW E&M-EST. PATIENT-LVL II: CPT | Mod: PBBFAC,,, | Performed by: FAMILY MEDICINE

## 2020-10-07 PROCEDURE — 99396 PREV VISIT EST AGE 40-64: CPT | Mod: 25,S$GLB,, | Performed by: FAMILY MEDICINE

## 2020-10-07 PROCEDURE — 83036 HEMOGLOBIN GLYCOSYLATED A1C: CPT

## 2020-10-07 PROCEDURE — 80061 LIPID PANEL: CPT

## 2020-10-07 PROCEDURE — 84550 ASSAY OF BLOOD/URIC ACID: CPT

## 2020-10-07 NOTE — PROGRESS NOTES
Chief Complaint   Patient presents with    Follow-up     SUBJECTIVE:   45 y.o. female for annual routine checkup.  Current Outpatient Medications   Medication Sig Dispense Refill    amLODIPine (NORVASC) 10 MG tablet Take 1 tablet (10 mg total) by mouth once daily. 90 tablet 3    azelastine (ASTELIN) 137 mcg (0.1 %) nasal spray 1 spray (137 mcg total) by Nasal route 2 (two) times daily. 30 mL 0    chlorthalidone (HYGROTEN) 25 MG Tab Take 1 tablet (25 mg total) by mouth once daily. 90 tablet 3    DULoxetine (CYMBALTA) 20 MG capsule Take 1 capsule (20 mg total) by mouth once daily. 30 capsule 2    levocetirizine (XYZAL) 5 MG tablet Take 1 tablet (5 mg total) by mouth every evening. 30 tablet 11     No current facility-administered medications for this visit.      Allergies: Aspirin and Keflex [cephalexin]   No LMP recorded.    ROS:  Feeling well. No dyspnea or chest pain on exertion.  No abdominal pain, change in bowel habits, black or bloody stools.  No urinary tract symptoms. GYN ROS: no breast pain or new or enlarging lumps on self exam, she complains of menses issues with this. No neurological complaints.    OBJECTIVE:   The patient appears well, alert, oriented x 3, in no distress.  There were no vitals taken for this visit.  ENT normal.  Neck supple. No adenopathy or thyromegaly. LORENZO. Lungs are clear, good air entry, no wheezes, rhonchi or rales. S1 and S2 normal, no murmurs, regular rate and rhythm. Abdomen soft without tenderness, guarding, mass or organomegaly. Extremities show no edema, normal peripheral pulses. Neurological is normal, no focal findings.  Has obesity  BREAST EXAM: deferred    PELVIC EXAM: dfeferred    ASSESSMENT:   1. Annual physical exam          PLAN:   Adama was seen today for follow-up.    Diagnoses and all orders for this visit:    Annual physical exam  -     C. trachomatis/N. gonorrhoeae by AMP DNA; Future  -     CBC auto differential; Future  -     Comprehensive Metabolic  Panel; Future  -     Hemoglobin A1C; Future  -     Hepatitis C Antibody; Future  -     HIV 1/2 Ag/Ab (4th Gen); Future  -     Lipid Panel; Future  -     Urinalysis; Future  -     Uric Acid; Future  -     TSH; Future  -     RPR; Future    Counseled on age appropriate medical preventative services, including age appropriate cancer screenings, over all nutritional health, need for a consistent exercise regimen and an over all push towards maintaining a vigorous and active lifestyle.  Counseled on age appropriate vaccines and discussed upcoming health care needs based on age/gender.  Spent time with patient counseling on need for a good patient/doctor relationship moving forward.  Discussed use of common OTC medications and supplements.  Discussed common dietary aids and use of caffeine and the need for good sleep hygiene and stress management.

## 2020-10-07 NOTE — PROGRESS NOTES
After obtaining consent, and per orders of Dr. Prather, injection of flu shot given by Sudha Donahue. Patient instructed to remain in clinic for 20 minutes afterwards, and to report any adverse reaction to me immediately.

## 2020-10-08 LAB
ESTIMATED AVG GLUCOSE: 108 MG/DL (ref 68–131)
HBA1C MFR BLD HPLC: 5.4 % (ref 4–5.6)
HIV 1+2 AB+HIV1 P24 AG SERPL QL IA: NEGATIVE
RPR SER QL: NORMAL

## 2020-10-09 LAB — HCV AB SERPL QL IA: NEGATIVE

## 2020-11-03 ENCOUNTER — PATIENT OUTREACH (OUTPATIENT)
Dept: OTHER | Facility: OTHER | Age: 45
End: 2020-11-03

## 2020-12-08 ENCOUNTER — OFFICE VISIT (OUTPATIENT)
Dept: FAMILY MEDICINE | Facility: CLINIC | Age: 45
End: 2020-12-08
Payer: COMMERCIAL

## 2020-12-08 VITALS
DIASTOLIC BLOOD PRESSURE: 94 MMHG | RESPIRATION RATE: 16 BRPM | SYSTOLIC BLOOD PRESSURE: 140 MMHG | HEART RATE: 85 BPM | OXYGEN SATURATION: 99 % | WEIGHT: 237.19 LBS | TEMPERATURE: 99 F | HEIGHT: 62 IN | BODY MASS INDEX: 43.65 KG/M2

## 2020-12-08 DIAGNOSIS — Z12.39 ENCOUNTER FOR SCREENING FOR MALIGNANT NEOPLASM OF BREAST, UNSPECIFIED SCREENING MODALITY: Primary | ICD-10-CM

## 2020-12-08 DIAGNOSIS — K13.0 LIP LESION: ICD-10-CM

## 2020-12-08 DIAGNOSIS — R20.2 NUMBNESS AND TINGLING: ICD-10-CM

## 2020-12-08 DIAGNOSIS — R20.0 NUMBNESS AND TINGLING: ICD-10-CM

## 2020-12-08 PROCEDURE — 1125F AMNT PAIN NOTED PAIN PRSNT: CPT | Mod: S$GLB,,, | Performed by: PHYSICIAN ASSISTANT

## 2020-12-08 PROCEDURE — 3077F PR MOST RECENT SYSTOLIC BLOOD PRESSURE >= 140 MM HG: ICD-10-PCS | Mod: CPTII,S$GLB,, | Performed by: PHYSICIAN ASSISTANT

## 2020-12-08 PROCEDURE — 99999 PR PBB SHADOW E&M-EST. PATIENT-LVL IV: CPT | Mod: PBBFAC,,, | Performed by: PHYSICIAN ASSISTANT

## 2020-12-08 PROCEDURE — 3008F BODY MASS INDEX DOCD: CPT | Mod: CPTII,S$GLB,, | Performed by: PHYSICIAN ASSISTANT

## 2020-12-08 PROCEDURE — 1125F PR PAIN SEVERITY QUANTIFIED, PAIN PRESENT: ICD-10-PCS | Mod: S$GLB,,, | Performed by: PHYSICIAN ASSISTANT

## 2020-12-08 PROCEDURE — 99214 PR OFFICE/OUTPT VISIT, EST, LEVL IV, 30-39 MIN: ICD-10-PCS | Mod: S$GLB,,, | Performed by: PHYSICIAN ASSISTANT

## 2020-12-08 PROCEDURE — 3008F PR BODY MASS INDEX (BMI) DOCUMENTED: ICD-10-PCS | Mod: CPTII,S$GLB,, | Performed by: PHYSICIAN ASSISTANT

## 2020-12-08 PROCEDURE — 99999 PR PBB SHADOW E&M-EST. PATIENT-LVL IV: ICD-10-PCS | Mod: PBBFAC,,, | Performed by: PHYSICIAN ASSISTANT

## 2020-12-08 PROCEDURE — 99214 OFFICE O/P EST MOD 30 MIN: CPT | Mod: S$GLB,,, | Performed by: PHYSICIAN ASSISTANT

## 2020-12-08 PROCEDURE — 3077F SYST BP >= 140 MM HG: CPT | Mod: CPTII,S$GLB,, | Performed by: PHYSICIAN ASSISTANT

## 2020-12-08 PROCEDURE — 3080F PR MOST RECENT DIASTOLIC BLOOD PRESSURE >= 90 MM HG: ICD-10-PCS | Mod: CPTII,S$GLB,, | Performed by: PHYSICIAN ASSISTANT

## 2020-12-08 PROCEDURE — 3080F DIAST BP >= 90 MM HG: CPT | Mod: CPTII,S$GLB,, | Performed by: PHYSICIAN ASSISTANT

## 2020-12-08 RX ORDER — GABAPENTIN 100 MG/1
100 CAPSULE ORAL 3 TIMES DAILY
Qty: 90 CAPSULE | Refills: 0 | Status: SHIPPED | OUTPATIENT
Start: 2020-12-08 | End: 2020-12-22

## 2020-12-08 RX ORDER — VALACYCLOVIR HYDROCHLORIDE 1 G/1
1000 TABLET, FILM COATED ORAL EVERY 12 HOURS
Qty: 14 TABLET | Refills: 0 | Status: SHIPPED | OUTPATIENT
Start: 2020-12-08 | End: 2020-12-22

## 2020-12-14 ENCOUNTER — OFFICE VISIT (OUTPATIENT)
Dept: DERMATOLOGY | Facility: CLINIC | Age: 45
End: 2020-12-14
Payer: COMMERCIAL

## 2020-12-14 DIAGNOSIS — B00.1 HERPES LABIALIS: Primary | ICD-10-CM

## 2020-12-14 DIAGNOSIS — K13.0 LIP LESION: ICD-10-CM

## 2020-12-14 PROCEDURE — 99202 OFFICE O/P NEW SF 15 MIN: CPT | Mod: S$GLB,,, | Performed by: DERMATOLOGY

## 2020-12-14 PROCEDURE — 99999 PR PBB SHADOW E&M-EST. PATIENT-LVL II: CPT | Mod: PBBFAC,,,

## 2020-12-14 PROCEDURE — 99999 PR PBB SHADOW E&M-EST. PATIENT-LVL II: ICD-10-PCS | Mod: PBBFAC,,,

## 2020-12-14 PROCEDURE — 99202 PR OFFICE/OUTPT VISIT, NEW, LEVL II, 15-29 MIN: ICD-10-PCS | Mod: S$GLB,,, | Performed by: DERMATOLOGY

## 2020-12-14 NOTE — PATIENT INSTRUCTIONS
Understanding Cold Sores  Cold sores are small blisters or sores on the lip or sometimes inside the mouth. Many people get them from time to time. Cold sores usually are not serious, and they usually heal in a week or two. They are caused by 2 related viruses, herpes simplex type 1 and 2. These viruses spread very easily. Many people have one or both of these viruses in their body. More than 4 in every 5 people are infected with herpes simplex type 1. Once you have the virus that causes cold sores, it stays in your body for the rest of your life. But it can be inactive for long periods.  What causes a cold sore?  Cold sores are usually caused by herpes simplex virus type 1. Less often, they are caused by herpes simplex virus type 2. Herpes simplex virus type 2 is the more common cause of genital sores. The herpes viruses can enter the body through a break in the skin such as a scrape. Or they may enter through mucous membranes such as the lips or mouth. Some ways to get the viruses include:  · Kissing someone who has a cold sore  · Sharing a drinking glass, eating utensils, or lip balm with someone who has a cold sore  · Having oral sex with someone who has a cold sore  A  baby can also get the infection at birth.  If you have a herpes virus, you can to pass it along even when you dont have a sore.  Cold sores flare up occasionally. Things that can cause an outbreak include:  · Sun exposure  · Fever  · Stress or exhaustion  · Menstruation  · Skin irritation  · Another unrelated Illness such as pneumonia, urinary infection, or cancer  What are the symptoms of a cold sore?  Symptoms can include:  · A blister-like sore or cluster of sores. These often occur at the edge of the lips but may appear inside the mouth.  · Skin redness around the sores.  · Pain or itching in the area of the outbreak. Often the pain or itching develops 12 to 24 hours before the sore become visible.  · Flu-like symptoms, including  swollen glands, headache, body ache, or fever. These typically occur only at the time of the first infection.  Cold sores may also occur on fingers. They may rarely infect the eyes, a serious possible complication.  Some people have symptoms a day or two before an outbreak. They may feel tenderness, burning, itching, or tingling before a cold sore appears. Cold sores tend to come back in the same area that they first appeared.  How are cold sores treated?  Treatment for cold sores focuses on relieving and shortening symptoms. For people with frequent outbreaks, treatment works to decrease how often future episodes.  Treatments may include:  · Prescription or over-the-counter pain medicines. These can help with discomfort, especially if sores are inside the mouth.  · Antiviral medicines. These may be pills that are taken by mouth or a cream to apply to sores. They may help shorten an outbreak and reduce the severity of symptoms.  They may be used to help prevent future outbreaks if you have disabling recurrent infections.  · Self-care such as extra rest and drinking more fluids. These may help relieve the flu-like symptoms of a first outbreak.  How are cold sores diagnosed?  Your healthcare provider makes the diagnosis mainly by looking at the sores and doing a clinical exam.  This may be confirmed by swab tests or blood tests.  How can I prevent cold sores?  You can help reduce the spread of the herpes viruses that cause cold sores. This can help both you and others avoid getting cold sores. Follow these tips:  · Do not kiss others if you have a cold sore. Also avoid kissing someone with a cold sore.  · Do not share eating utensils, lip balm, razors, or towels with someone who has a cold sore.  · Wash your hands after touching the area of a cold sore. The herpes virus can be carried from your face to your hands when you touch the area of a cold sore. When this happens, wash your hands thoroughly, for at least 20  seconds. When you cant wash with soap and water, use an alcohol-based hand .  · Disinfect things you touch often, such as phones and keyboards.    · If you feel a cold sore coming on, do the same things you would do when a cold sore is present to avoid spreading the virus.  · Use condoms to help prevent passing on the viruses through sex.  What are the possible complications of a cold sore?  Cold sores usually go away by themselves within 2 weeks. For most people cold sores are not serious. The viruses that cause cold sores can cause more serious illness, though. People who have a weak immune system may get more serious infections from herpes viruses. These include people being treated for cancer or who have HIV disease. Babies may also become very ill from a herpes infection.      When should I call my healthcare provider?  Call your healthcare provider right away if you have any of these:  · Fever of 100.4°F (38°C) or higher, or as directed  · Pain that gets worse  · You cannot eat or drink because of painful sores  · Symptoms dont get better within 5 to 7 days  · Blisters spread beyond the mouth or lip to areas on the chest, arms, face, or legs   Date Last Reviewed: 3/28/2016  © 5137-3819 [employer]. [address] [place] [address], [place] [address]. All rights reserved. This information is not intended as a substitute for professional medical care. Always follow your healthcare professional's instructions.

## 2020-12-14 NOTE — PROGRESS NOTES
Subjective:       Patient ID: Adama Ortiz is a 45 y.o. female with multiple medical diagnoses as listed in the medical history and problem list that presents for Mouth Lesions (12 days)  .    Chief Complaint: Mouth Lesions (12 days)      HPI   Pt started with lesion on her lip almost 2 weeks ago. She did not know at first so states she bit her lip and it ripped open. Now there is an elevated scab on her lip that has not changed in size at all. She has tried abrevia, carmex, but all of that burns.   She states she noticed tingling prior to the lesion.     Review of Systems      PAST MEDICAL HISTORY:  Past Medical History:   Diagnosis Date    Hypertension        SOCIAL HISTORY:  Social History     Socioeconomic History    Marital status:      Spouse name: Not on file    Number of children: Not on file    Years of education: Not on file    Highest education level: Not on file   Occupational History    Not on file   Social Needs    Financial resource strain: Not very hard    Food insecurity     Worry: Never true     Inability: Never true    Transportation needs     Medical: No     Non-medical: No   Tobacco Use    Smoking status: Never Smoker    Smokeless tobacco: Never Used   Substance and Sexual Activity    Alcohol use: No     Alcohol/week: 0.0 standard drinks     Frequency: Monthly or less     Drinks per session: 1 or 2     Binge frequency: Never     Comment: occ    Drug use: No    Sexual activity: Yes     Partners: Male     Birth control/protection: None   Lifestyle    Physical activity     Days per week: 2 days     Minutes per session: 50 min    Stress: Only a little   Relationships    Social connections     Talks on phone: More than three times a week     Gets together: Patient refused     Attends [employer] service: Not on file     Active member of club or organization: No     Attends meetings of clubs or organizations: Patient refused     Relationship status:    Other Topics  "Concern    Not on file   Social History Narrative    Not on file       ALLERGIES AND MEDICATIONS: updated and reviewed.  Review of patient's allergies indicates:   Allergen Reactions    Aspirin Swelling    Keflex [cephalexin] Swelling and Rash     Current Outpatient Medications   Medication Sig Dispense Refill    gabapentin (NEURONTIN) 100 MG capsule Take 1 capsule (100 mg total) by mouth 3 (three) times daily. 90 capsule 0    valACYclovir (VALTREX) 1000 MG tablet Take 1 tablet (1,000 mg total) by mouth every 12 (twelve) hours. for 7 days 14 tablet 0     No current facility-administered medications for this visit.          Objective:   BP (!) 140/94   Pulse 85   Temp 99 °F (37.2 °C) (Oral)   Resp 16   Ht 5' 2" (1.575 m)   Wt 107.6 kg (237 lb 3.4 oz)   LMP 12/04/2020   SpO2 99%   BMI 43.39 kg/m²      Physical Exam  HENT:      Head:               Assessment:       1. Encounter for screening for malignant neoplasm of breast, unspecified screening modality    2. Lip lesion    3. Numbness and tingling        Plan:       Encounter for screening for malignant neoplasm of breast, unspecified screening modality  -     Mammo Digital Screening Bilat; Future; Expected date: 12/08/2020    Lip lesion  -     Ambulatory referral/consult to Dermatology; Future; Expected date: 12/15/2020  -     valACYclovir (VALTREX) 1000 MG tablet; Take 1 tablet (1,000 mg total) by mouth every 12 (twelve) hours. for 7 days  Dispense: 14 tablet; Refill: 0  Due to abnormal nature of lesions appearance.     Numbness and tingling  -     gabapentin (NEURONTIN) 100 MG capsule; Take 1 capsule (100 mg total) by mouth 3 (three) times daily.  Dispense: 90 capsule; Refill: 0            No follow-ups on file.    "

## 2020-12-14 NOTE — PROGRESS NOTES
Subjective:       Patient ID:  Adama Ortiz is a 45 y.o. female who presents for   Chief Complaint   Patient presents with    Lesion     lip     HPI  44 y/o AAF with history of cold sores to upper lip presents for initial evaluation of above CC. Pt reports lip lesion started about three weeks ago and involves lower lip. She reports initial burning and itching to the lower lip and afew hours later, a large blister came out.  She tried Abreva but it got worse. Pt was evaluated by her PCP and started on Valtrex 1g BID for 7 days. She has completed 4 days of therapy. Also started on Gabapentin 100mg TID for the itching, which pt reports she only takes at night due to drowsiness.   Today pt reports lesion has gotten smaller but still has pain and pruritus to the lips and perioral region. Also reports numbness to her tongue. No genital lesions. No other skin complaints.     Review of Systems   Constitutional: Negative for fever and chills.   HENT: Positive for mouth sores. Negative for congestion and sore throat.    Eyes: Negative for itching and eye watering.   Respiratory: Negative.    Gastrointestinal: Negative.    Genitourinary: Negative for dysuria and genital sores.   Musculoskeletal: Negative.    Skin: Positive for itching. Negative for rash, daily sunscreen use, activity-related sunscreen use, recent sunburn and wears hat.   Allergic/Immunologic: Negative.         Objective:    Physical Exam   Constitutional: She appears well-developed and well-nourished.   Neurological: She is alert and oriented to person, place, and time.   Skin:                 Diagram Legend     Erythematous scaling macule/papule c/w actinic keratosis       Vascular papule c/w angioma      Pigmented verrucoid papule/plaque c/w seborrheic keratosis      Yellow umbilicated papule c/w sebaceous hyperplasia      Irregularly shaped tan macule c/w lentigo     1-2 mm smooth white papules consistent with Milia      Movable subcutaneous cyst with  punctum c/w epidermal inclusion cyst      Subcutaneous movable cyst c/w pilar cyst      Firm pink to brown papule c/w dermatofibroma      Pedunculated fleshy papule(s) c/w skin tag(s)      Evenly pigmented macule c/w junctional nevus     Mildly variegated pigmented, slightly irregular-bordered macule c/w mildly atypical nevus      Flesh colored to evenly pigmented papule c/w intradermal nevus       Pink pearly papule/plaque c/w basal cell carcinoma      Erythematous hyperkeratotic cursted plaque c/w SCC      Surgical scar with no sign of skin cancer recurrence      Open and closed comedones      Inflammatory papules and pustules      Verrucoid papule consistent consistent with wart     Erythematous eczematous patches and plaques     Dystrophic onycholytic nail with subungual debris c/w onychomycosis     Umbilicated papule    Erythematous-base heme-crusted tan verrucoid plaque consistent with inflamed seborrheic keratosis     Erythematous Silvery Scaling Plaque c/w Psoriasis     See annotation      Assessment / Plan:        Herpes labialis  -findings and history c/w herpes simplex labialis  -appears to be improving; has taken Valtrex 1g BID for 4 days; will hold off on PCR   -discussed dosing and use of Valtrex during flare-2g BID for one day on the onset of symptoms. Can discontinue Valtrex now and save for flares  -can continue Gabapentin 100mg qHS for perioral pain and pruritus PRN  -start Sarna or Capsacin cream to perioral area  -start aquaphor as needed to lips daily     RTC PRN

## 2020-12-22 ENCOUNTER — HOSPITAL ENCOUNTER (OUTPATIENT)
Dept: RADIOLOGY | Facility: HOSPITAL | Age: 45
Discharge: HOME | End: 2020-12-22
Attending: PHYSICIAN ASSISTANT
Payer: COMMERCIAL

## 2020-12-22 ENCOUNTER — CLINICAL SUPPORT (OUTPATIENT)
Dept: FAMILY MEDICINE | Facility: CLINIC | Age: 45
End: 2020-12-22
Payer: COMMERCIAL

## 2020-12-22 VITALS — HEIGHT: 62 IN | WEIGHT: 237.19 LBS | BODY MASS INDEX: 43.65 KG/M2

## 2020-12-22 VITALS — DIASTOLIC BLOOD PRESSURE: 78 MMHG | SYSTOLIC BLOOD PRESSURE: 124 MMHG

## 2020-12-22 DIAGNOSIS — Z12.39 ENCOUNTER FOR SCREENING FOR MALIGNANT NEOPLASM OF BREAST, UNSPECIFIED SCREENING MODALITY: ICD-10-CM

## 2020-12-22 DIAGNOSIS — R03.0 ELEVATED BLOOD PRESSURE READING IN OFFICE WITHOUT DIAGNOSIS OF HYPERTENSION: Primary | ICD-10-CM

## 2020-12-22 PROCEDURE — 99999 PR PBB SHADOW E&M-EST. PATIENT-LVL II: ICD-10-PCS | Mod: PBBFAC,,,

## 2020-12-22 PROCEDURE — 99999 PR PBB SHADOW E&M-EST. PATIENT-LVL II: CPT | Mod: PBBFAC,,,

## 2020-12-22 PROCEDURE — 77067 MAMMO DIGITAL SCREENING BILAT WITH TOMO: ICD-10-PCS | Mod: 26,,, | Performed by: RADIOLOGY

## 2020-12-22 PROCEDURE — 77067 SCR MAMMO BI INCL CAD: CPT | Mod: TC

## 2020-12-22 PROCEDURE — 77063 BREAST TOMOSYNTHESIS BI: CPT | Mod: 26,,, | Performed by: RADIOLOGY

## 2020-12-22 PROCEDURE — 77067 SCR MAMMO BI INCL CAD: CPT | Mod: 26,,, | Performed by: RADIOLOGY

## 2020-12-22 PROCEDURE — 77063 MAMMO DIGITAL SCREENING BILAT WITH TOMO: ICD-10-PCS | Mod: 26,,, | Performed by: RADIOLOGY

## 2020-12-22 NOTE — PROGRESS NOTES
Adama Ortiz 45 y.o. female is here today for Blood Pressure check.   History of HTN no.    Review of patient's allergies indicates:   Allergen Reactions    Aspirin Swelling    Keflex [cephalexin] Swelling and Rash     Creatinine   Date Value Ref Range Status   10/07/2020 1.1 0.5 - 1.4 mg/dL Final     Sodium   Date Value Ref Range Status   10/07/2020 139 136 - 145 mmol/L Final     Potassium   Date Value Ref Range Status   10/07/2020 4.1 3.5 - 5.1 mmol/L Final   ]  Patient denies taking blood pressure medications on a regular basis at the same time of the day.   No current outpatient medications on file.  Does patient have record of home blood pressure readings no. Readings have been averaging n/a.   Last dose of blood pressure medication was taken at n/a.  Patient is asymptomatic.   Complains of n/a.    Vitals:    12/22/20 0821   BP: 124/78   BP Location: Right arm   Patient Position: Sitting   BP Method: Large (Manual)         Dr. Prather informed of nurse visit.

## 2021-01-05 ENCOUNTER — OFFICE VISIT (OUTPATIENT)
Dept: FAMILY MEDICINE | Facility: CLINIC | Age: 46
End: 2021-01-05
Payer: COMMERCIAL

## 2021-01-05 ENCOUNTER — LAB VISIT (OUTPATIENT)
Dept: FAMILY MEDICINE | Facility: CLINIC | Age: 46
End: 2021-01-05
Payer: COMMERCIAL

## 2021-01-05 DIAGNOSIS — Z20.822 SUSPECTED COVID-19 VIRUS INFECTION: Primary | ICD-10-CM

## 2021-01-05 DIAGNOSIS — Z20.822 SUSPECTED COVID-19 VIRUS INFECTION: ICD-10-CM

## 2021-01-05 PROCEDURE — 99213 PR OFFICE/OUTPT VISIT, EST, LEVL III, 20-29 MIN: ICD-10-PCS | Mod: 95,,, | Performed by: PHYSICIAN ASSISTANT

## 2021-01-05 PROCEDURE — U0003 INFECTIOUS AGENT DETECTION BY NUCLEIC ACID (DNA OR RNA); SEVERE ACUTE RESPIRATORY SYNDROME CORONAVIRUS 2 (SARS-COV-2) (CORONAVIRUS DISEASE [COVID-19]), AMPLIFIED PROBE TECHNIQUE, MAKING USE OF HIGH THROUGHPUT TECHNOLOGIES AS DESCRIBED BY CMS-2020-01-R: HCPCS

## 2021-01-05 PROCEDURE — 99213 OFFICE O/P EST LOW 20 MIN: CPT | Mod: 95,,, | Performed by: PHYSICIAN ASSISTANT

## 2021-01-05 RX ORDER — AZELASTINE 1 MG/ML
1 SPRAY, METERED NASAL 2 TIMES DAILY
Qty: 30 ML | Refills: 11 | Status: SHIPPED | OUTPATIENT
Start: 2021-01-05 | End: 2021-12-06

## 2021-01-05 RX ORDER — FLUTICASONE PROPIONATE 50 MCG
1 SPRAY, SUSPENSION (ML) NASAL 2 TIMES DAILY
Qty: 16 G | Refills: 12 | Status: SHIPPED | OUTPATIENT
Start: 2021-01-05 | End: 2021-02-04

## 2021-01-05 RX ORDER — ONDANSETRON 4 MG/1
4 TABLET, ORALLY DISINTEGRATING ORAL EVERY 8 HOURS PRN
Qty: 20 TABLET | Refills: 0 | Status: SHIPPED | OUTPATIENT
Start: 2021-01-05 | End: 2021-12-06

## 2021-01-06 LAB — SARS-COV-2 RNA RESP QL NAA+PROBE: NOT DETECTED

## 2021-05-03 ENCOUNTER — HOSPITAL ENCOUNTER (EMERGENCY)
Facility: HOSPITAL | Age: 46
Discharge: HOME | End: 2021-05-04
Attending: EMERGENCY MEDICINE
Payer: COMMERCIAL

## 2021-05-03 DIAGNOSIS — O20.0 THREATENED MISCARRIAGE: Primary | ICD-10-CM

## 2021-05-03 DIAGNOSIS — N93.9 VAGINAL BLEEDING: ICD-10-CM

## 2021-05-03 PROCEDURE — 99284 EMERGENCY DEPT VISIT MOD MDM: CPT | Mod: 25

## 2021-05-03 PROCEDURE — 36000 PLACE NEEDLE IN VEIN: CPT

## 2021-05-03 PROCEDURE — 81025 URINE PREGNANCY TEST: CPT | Performed by: NURSE PRACTITIONER

## 2021-05-04 VITALS
TEMPERATURE: 98 F | OXYGEN SATURATION: 98 % | HEART RATE: 80 BPM | SYSTOLIC BLOOD PRESSURE: 142 MMHG | RESPIRATION RATE: 17 BRPM | DIASTOLIC BLOOD PRESSURE: 80 MMHG | WEIGHT: 235 LBS | BODY MASS INDEX: 42.98 KG/M2

## 2021-05-04 LAB
ABO + RH BLD: NORMAL
ALBUMIN SERPL BCP-MCNC: 3.1 G/DL (ref 3.5–5.2)
ALP SERPL-CCNC: 69 U/L (ref 55–135)
ALT SERPL W/O P-5'-P-CCNC: 43 U/L (ref 10–44)
ANION GAP SERPL CALC-SCNC: 10 MMOL/L (ref 8–16)
AST SERPL-CCNC: 30 U/L (ref 10–40)
B-HCG UR QL: POSITIVE
BACTERIA #/AREA URNS HPF: NORMAL /HPF
BASOPHILS # BLD AUTO: 0.07 K/UL (ref 0–0.2)
BASOPHILS NFR BLD: 0.4 % (ref 0–1.9)
BILIRUB SERPL-MCNC: 0.2 MG/DL (ref 0.1–1)
BILIRUB UR QL STRIP: NEGATIVE
BUN SERPL-MCNC: 13 MG/DL (ref 6–20)
CALCIUM SERPL-MCNC: 9.1 MG/DL (ref 8.7–10.5)
CHLORIDE SERPL-SCNC: 107 MMOL/L (ref 95–110)
CLARITY UR: CLEAR
CO2 SERPL-SCNC: 21 MMOL/L (ref 23–29)
COLOR UR: YELLOW
CREAT SERPL-MCNC: 0.8 MG/DL (ref 0.5–1.4)
CTP QC/QA: YES
DIFFERENTIAL METHOD: ABNORMAL
EOSINOPHIL # BLD AUTO: 0.3 K/UL (ref 0–0.5)
EOSINOPHIL NFR BLD: 1.8 % (ref 0–8)
ERYTHROCYTE [DISTWIDTH] IN BLOOD BY AUTOMATED COUNT: 13.7 % (ref 11.5–14.5)
EST. GFR  (AFRICAN AMERICAN): >60 ML/MIN/1.73 M^2
EST. GFR  (NON AFRICAN AMERICAN): >60 ML/MIN/1.73 M^2
GLUCOSE SERPL-MCNC: 85 MG/DL (ref 70–110)
GLUCOSE UR QL STRIP: NEGATIVE
HCG INTACT+B SERPL-ACNC: 4031 MIU/ML
HCT VFR BLD AUTO: 37.7 % (ref 37–48.5)
HGB BLD-MCNC: 12.6 G/DL (ref 12–16)
HGB UR QL STRIP: ABNORMAL
IMM GRANULOCYTES # BLD AUTO: 0.06 K/UL (ref 0–0.04)
IMM GRANULOCYTES NFR BLD AUTO: 0.4 % (ref 0–0.5)
KETONES UR QL STRIP: NEGATIVE
LEUKOCYTE ESTERASE UR QL STRIP: NEGATIVE
LYMPHOCYTES # BLD AUTO: 5 K/UL (ref 1–4.8)
LYMPHOCYTES NFR BLD: 30 % (ref 18–48)
MCH RBC QN AUTO: 29.8 PG (ref 27–31)
MCHC RBC AUTO-ENTMCNC: 33.4 G/DL (ref 32–36)
MCV RBC AUTO: 89 FL (ref 82–98)
MICROSCOPIC COMMENT: NORMAL
MONOCYTES # BLD AUTO: 1 K/UL (ref 0.3–1)
MONOCYTES NFR BLD: 6.3 % (ref 4–15)
NEUTROPHILS # BLD AUTO: 10.1 K/UL (ref 1.8–7.7)
NEUTROPHILS NFR BLD: 61.1 % (ref 38–73)
NITRITE UR QL STRIP: NEGATIVE
NRBC BLD-RTO: 0 /100 WBC
PH UR STRIP: 6 [PH] (ref 5–8)
PLATELET # BLD AUTO: 331 K/UL (ref 150–450)
PMV BLD AUTO: 10.8 FL (ref 9.2–12.9)
POTASSIUM SERPL-SCNC: 4 MMOL/L (ref 3.5–5.1)
PROT SERPL-MCNC: 7.1 G/DL (ref 6–8.4)
PROT UR QL STRIP: NEGATIVE
RBC # BLD AUTO: 4.23 M/UL (ref 4–5.4)
RBC #/AREA URNS HPF: 2 /HPF (ref 0–4)
SODIUM SERPL-SCNC: 138 MMOL/L (ref 136–145)
SP GR UR STRIP: 1.02 (ref 1–1.03)
SQUAMOUS #/AREA URNS HPF: 4 /HPF
URN SPEC COLLECT METH UR: ABNORMAL
UROBILINOGEN UR STRIP-ACNC: NEGATIVE EU/DL
WBC # BLD AUTO: 16.55 K/UL (ref 3.9–12.7)
WBC #/AREA URNS HPF: 1 /HPF (ref 0–5)

## 2021-05-04 PROCEDURE — 80053 COMPREHEN METABOLIC PANEL: CPT | Performed by: NURSE PRACTITIONER

## 2021-05-04 PROCEDURE — 84702 CHORIONIC GONADOTROPIN TEST: CPT | Performed by: NURSE PRACTITIONER

## 2021-05-04 PROCEDURE — 86900 BLOOD TYPING SEROLOGIC ABO: CPT | Performed by: EMERGENCY MEDICINE

## 2021-05-04 PROCEDURE — 81000 URINALYSIS NONAUTO W/SCOPE: CPT | Performed by: NURSE PRACTITIONER

## 2021-05-04 PROCEDURE — 85025 COMPLETE CBC W/AUTO DIFF WBC: CPT | Performed by: EMERGENCY MEDICINE

## 2021-05-04 RX ORDER — ESTRADIOL VALERATE 20 MG/ML
INJECTION INTRAMUSCULAR
COMMUNITY
Start: 2021-04-23 | End: 2021-12-06

## 2021-05-04 RX ORDER — PROGESTERONE 50 MG/ML
100 INJECTION, SOLUTION INTRAMUSCULAR DAILY
COMMUNITY
Start: 2021-04-23 | End: 2021-12-06

## 2021-05-04 RX ORDER — ESTRADIOL 2 MG/1
2 TABLET ORAL 4 TIMES DAILY
COMMUNITY
Start: 2021-04-26 | End: 2021-12-06

## 2021-05-04 RX ORDER — ESTRADIOL 0.1 MG/D
FILM, EXTENDED RELEASE TRANSDERMAL
COMMUNITY
Start: 2021-04-23 | End: 2021-12-06

## 2021-12-06 ENCOUNTER — OFFICE VISIT (OUTPATIENT)
Dept: FAMILY MEDICINE | Facility: CLINIC | Age: 46
End: 2021-12-06
Payer: COMMERCIAL

## 2021-12-06 VITALS
OXYGEN SATURATION: 97 % | SYSTOLIC BLOOD PRESSURE: 144 MMHG | WEIGHT: 264.56 LBS | TEMPERATURE: 98 F | HEART RATE: 75 BPM | HEIGHT: 62 IN | DIASTOLIC BLOOD PRESSURE: 108 MMHG | BODY MASS INDEX: 48.68 KG/M2

## 2021-12-06 DIAGNOSIS — I10 PRIMARY HYPERTENSION: ICD-10-CM

## 2021-12-06 DIAGNOSIS — E22.1 HYPERPROLACTINEMIA: ICD-10-CM

## 2021-12-06 DIAGNOSIS — Z00.00 ANNUAL PHYSICAL EXAM: Primary | ICD-10-CM

## 2021-12-06 DIAGNOSIS — Z12.31 ENCOUNTER FOR SCREENING MAMMOGRAM FOR BREAST CANCER: ICD-10-CM

## 2021-12-06 DIAGNOSIS — Z12.4 PAP SMEAR FOR CERVICAL CANCER SCREENING: ICD-10-CM

## 2021-12-06 DIAGNOSIS — Z23 NEEDS FLU SHOT: ICD-10-CM

## 2021-12-06 PROCEDURE — 99999 PR PBB SHADOW E&M-EST. PATIENT-LVL III: CPT | Mod: PBBFAC,,, | Performed by: INTERNAL MEDICINE

## 2021-12-06 PROCEDURE — 99396 PR PREVENTIVE VISIT,EST,40-64: ICD-10-PCS | Mod: S$GLB,,, | Performed by: INTERNAL MEDICINE

## 2021-12-06 PROCEDURE — 99396 PREV VISIT EST AGE 40-64: CPT | Mod: S$GLB,,, | Performed by: INTERNAL MEDICINE

## 2021-12-06 PROCEDURE — 99999 PR PBB SHADOW E&M-EST. PATIENT-LVL III: ICD-10-PCS | Mod: PBBFAC,,, | Performed by: INTERNAL MEDICINE

## 2021-12-06 RX ORDER — AMLODIPINE BESYLATE 10 MG/1
10 TABLET ORAL DAILY
Qty: 90 TABLET | Refills: 0 | Status: SHIPPED | OUTPATIENT
Start: 2021-12-06 | End: 2022-09-26

## 2021-12-14 ENCOUNTER — LAB VISIT (OUTPATIENT)
Dept: LAB | Facility: HOSPITAL | Age: 46
End: 2021-12-14
Attending: INTERNAL MEDICINE
Payer: COMMERCIAL

## 2021-12-14 DIAGNOSIS — Z00.00 ANNUAL PHYSICAL EXAM: ICD-10-CM

## 2021-12-14 LAB
ALBUMIN SERPL BCP-MCNC: 3.8 G/DL (ref 3.5–5.2)
ALP SERPL-CCNC: 66 U/L (ref 55–135)
ALT SERPL W/O P-5'-P-CCNC: 18 U/L (ref 10–44)
ANION GAP SERPL CALC-SCNC: 5 MMOL/L (ref 8–16)
AST SERPL-CCNC: 22 U/L (ref 10–40)
BASOPHILS # BLD AUTO: 0.07 K/UL (ref 0–0.2)
BASOPHILS NFR BLD: 0.8 % (ref 0–1.9)
BILIRUB SERPL-MCNC: 0.5 MG/DL (ref 0.1–1)
BUN SERPL-MCNC: 15 MG/DL (ref 6–20)
CALCIUM SERPL-MCNC: 9.2 MG/DL (ref 8.7–10.5)
CHLORIDE SERPL-SCNC: 106 MMOL/L (ref 95–110)
CHOLEST SERPL-MCNC: 209 MG/DL (ref 120–199)
CHOLEST/HDLC SERPL: 3.1 {RATIO} (ref 2–5)
CO2 SERPL-SCNC: 30 MMOL/L (ref 23–29)
CREAT SERPL-MCNC: 0.8 MG/DL (ref 0.5–1.4)
DIFFERENTIAL METHOD: NORMAL
EOSINOPHIL # BLD AUTO: 0.2 K/UL (ref 0–0.5)
EOSINOPHIL NFR BLD: 2.3 % (ref 0–8)
ERYTHROCYTE [DISTWIDTH] IN BLOOD BY AUTOMATED COUNT: 12.7 % (ref 11.5–14.5)
EST. GFR  (AFRICAN AMERICAN): >60 ML/MIN/1.73 M^2
EST. GFR  (NON AFRICAN AMERICAN): >60 ML/MIN/1.73 M^2
ESTIMATED AVG GLUCOSE: 105 MG/DL (ref 68–131)
GLUCOSE SERPL-MCNC: 94 MG/DL (ref 70–110)
HBA1C MFR BLD: 5.3 % (ref 4–5.6)
HCT VFR BLD AUTO: 43.6 % (ref 37–48.5)
HDLC SERPL-MCNC: 68 MG/DL (ref 40–75)
HDLC SERPL: 32.5 % (ref 20–50)
HGB BLD-MCNC: 14.4 G/DL (ref 12–16)
IMM GRANULOCYTES # BLD AUTO: 0.01 K/UL (ref 0–0.04)
IMM GRANULOCYTES NFR BLD AUTO: 0.1 % (ref 0–0.5)
LDLC SERPL CALC-MCNC: 125.2 MG/DL (ref 63–159)
LYMPHOCYTES # BLD AUTO: 4.4 K/UL (ref 1–4.8)
LYMPHOCYTES NFR BLD: 47.2 % (ref 18–48)
MCH RBC QN AUTO: 29.3 PG (ref 27–31)
MCHC RBC AUTO-ENTMCNC: 33 G/DL (ref 32–36)
MCV RBC AUTO: 89 FL (ref 82–98)
MONOCYTES # BLD AUTO: 0.6 K/UL (ref 0.3–1)
MONOCYTES NFR BLD: 6.9 % (ref 4–15)
NEUTROPHILS # BLD AUTO: 3.9 K/UL (ref 1.8–7.7)
NEUTROPHILS NFR BLD: 42.7 % (ref 38–73)
NONHDLC SERPL-MCNC: 141 MG/DL
NRBC BLD-RTO: 0 /100 WBC
PLATELET # BLD AUTO: 376 K/UL (ref 150–450)
PMV BLD AUTO: 10.1 FL (ref 9.2–12.9)
POTASSIUM SERPL-SCNC: 5.1 MMOL/L (ref 3.5–5.1)
PROT SERPL-MCNC: 7.5 G/DL (ref 6–8.4)
RBC # BLD AUTO: 4.91 M/UL (ref 4–5.4)
SODIUM SERPL-SCNC: 141 MMOL/L (ref 136–145)
TRIGL SERPL-MCNC: 79 MG/DL (ref 30–150)
TSH SERPL DL<=0.005 MIU/L-ACNC: 2.63 UIU/ML (ref 0.4–4)
WBC # BLD AUTO: 9.21 K/UL (ref 3.9–12.7)

## 2021-12-14 PROCEDURE — 80061 LIPID PANEL: CPT | Performed by: INTERNAL MEDICINE

## 2021-12-14 PROCEDURE — 85025 COMPLETE CBC W/AUTO DIFF WBC: CPT | Performed by: INTERNAL MEDICINE

## 2021-12-14 PROCEDURE — 84443 ASSAY THYROID STIM HORMONE: CPT | Performed by: INTERNAL MEDICINE

## 2021-12-14 PROCEDURE — 83036 HEMOGLOBIN GLYCOSYLATED A1C: CPT | Performed by: INTERNAL MEDICINE

## 2021-12-14 PROCEDURE — 36415 COLL VENOUS BLD VENIPUNCTURE: CPT | Mod: PN | Performed by: INTERNAL MEDICINE

## 2021-12-14 PROCEDURE — 80053 COMPREHEN METABOLIC PANEL: CPT | Performed by: INTERNAL MEDICINE

## 2021-12-20 ENCOUNTER — CLINICAL SUPPORT (OUTPATIENT)
Dept: FAMILY MEDICINE | Facility: CLINIC | Age: 46
End: 2021-12-20
Payer: COMMERCIAL

## 2021-12-20 VITALS — SYSTOLIC BLOOD PRESSURE: 154 MMHG | DIASTOLIC BLOOD PRESSURE: 96 MMHG

## 2021-12-20 DIAGNOSIS — I10 HYPERTENSION, UNSPECIFIED TYPE: Primary | ICD-10-CM

## 2021-12-28 ENCOUNTER — HOSPITAL ENCOUNTER (OUTPATIENT)
Dept: RADIOLOGY | Facility: HOSPITAL | Age: 46
Discharge: HOME | End: 2021-12-28
Attending: INTERNAL MEDICINE
Payer: COMMERCIAL

## 2021-12-28 DIAGNOSIS — Z12.31 ENCOUNTER FOR SCREENING MAMMOGRAM FOR BREAST CANCER: ICD-10-CM

## 2021-12-28 PROCEDURE — 77063 MAMMO DIGITAL SCREENING BILAT WITH TOMO: ICD-10-PCS | Mod: 26,,, | Performed by: RADIOLOGY

## 2021-12-28 PROCEDURE — 77063 BREAST TOMOSYNTHESIS BI: CPT | Mod: 26,,, | Performed by: RADIOLOGY

## 2021-12-28 PROCEDURE — 77067 SCR MAMMO BI INCL CAD: CPT | Mod: TC

## 2021-12-28 PROCEDURE — 77067 MAMMO DIGITAL SCREENING BILAT WITH TOMO: ICD-10-PCS | Mod: 26,,, | Performed by: RADIOLOGY

## 2021-12-28 PROCEDURE — 77067 SCR MAMMO BI INCL CAD: CPT | Mod: 26,,, | Performed by: RADIOLOGY

## 2021-12-29 ENCOUNTER — PATIENT OUTREACH (OUTPATIENT)
Dept: ADMINISTRATIVE | Facility: OTHER | Age: 46
End: 2021-12-29
Payer: COMMERCIAL

## 2022-01-03 ENCOUNTER — PATIENT MESSAGE (OUTPATIENT)
Dept: ADMINISTRATIVE | Facility: OTHER | Age: 47
End: 2022-01-03
Payer: COMMERCIAL

## 2022-01-03 ENCOUNTER — CLINICAL SUPPORT (OUTPATIENT)
Dept: FAMILY MEDICINE | Facility: CLINIC | Age: 47
End: 2022-01-03
Payer: COMMERCIAL

## 2022-01-03 VITALS — SYSTOLIC BLOOD PRESSURE: 148 MMHG | DIASTOLIC BLOOD PRESSURE: 98 MMHG

## 2022-01-03 DIAGNOSIS — I10 PRIMARY HYPERTENSION: Primary | ICD-10-CM

## 2022-01-03 PROCEDURE — 99999 PR PBB SHADOW E&M-EST. PATIENT-LVL I: CPT | Mod: PBBFAC,,,

## 2022-01-03 PROCEDURE — 99499 UNLISTED E&M SERVICE: CPT | Mod: S$GLB,,, | Performed by: INTERNAL MEDICINE

## 2022-01-03 PROCEDURE — 99999 PR PBB SHADOW E&M-EST. PATIENT-LVL I: ICD-10-PCS | Mod: PBBFAC,,,

## 2022-01-03 PROCEDURE — 99499 NO LOS: ICD-10-PCS | Mod: S$GLB,,, | Performed by: INTERNAL MEDICINE

## 2022-01-03 NOTE — PROGRESS NOTES
Adama Ortiz 46 y.o. female is here today for Blood Pressure check.   History of HTN yes.    Review of patient's allergies indicates:   Allergen Reactions    Aspirin Swelling    Keflex [cephalexin] Swelling and Rash     Creatinine   Date Value Ref Range Status   12/14/2021 0.8 0.5 - 1.4 mg/dL Final     Sodium   Date Value Ref Range Status   12/14/2021 141 136 - 145 mmol/L Final     Potassium   Date Value Ref Range Status   12/14/2021 5.1 3.5 - 5.1 mmol/L Final   ]  Patient denies taking blood pressure medications on a regular basis at the same time of the day.     Current Outpatient Medications:     amLODIPine (NORVASC) 10 MG tablet, Take 1 tablet (10 mg total) by mouth once daily., Disp: 90 tablet, Rfl: 0  Does patient have record of home blood pressure readings yes. Readings have been averaging  - no readings.   Last dose of blood pressure medication was taken at 7:40 am 1/3/2022.  Patient is asymptomatic.   Complains of  - no complaints.    148/98 right arm, sitting, manual           Dr. Sanchez informed of nurse visit.  Patient advised to return in 2 weeks for bp check or if she gets machine from [facility] to submit readings to them as indicated.  Patient verbalized understanding, scheduled appointment but will cancel office appointment if she get machine before then.

## 2022-01-18 ENCOUNTER — CLINICAL SUPPORT (OUTPATIENT)
Dept: FAMILY MEDICINE | Facility: CLINIC | Age: 47
End: 2022-01-18
Payer: COMMERCIAL

## 2022-01-18 VITALS — SYSTOLIC BLOOD PRESSURE: 144 MMHG | DIASTOLIC BLOOD PRESSURE: 84 MMHG

## 2022-01-18 DIAGNOSIS — I10 HYPERTENSION, UNSPECIFIED TYPE: Primary | ICD-10-CM

## 2022-01-18 PROCEDURE — 99999 PR PBB SHADOW E&M-EST. PATIENT-LVL I: CPT | Mod: PBBFAC,,,

## 2022-01-18 PROCEDURE — 99999 PR PBB SHADOW E&M-EST. PATIENT-LVL I: ICD-10-PCS | Mod: PBBFAC,,,

## 2022-01-18 NOTE — PROGRESS NOTES
Adama Ortiz 46 y.o. female is here today for Blood Pressure check.   History of HTN yes.    Review of patient's allergies indicates:   Allergen Reactions    Aspirin Swelling    Keflex [cephalexin] Swelling and Rash     Creatinine   Date Value Ref Range Status   12/14/2021 0.8 0.5 - 1.4 mg/dL Final     Sodium   Date Value Ref Range Status   12/14/2021 141 136 - 145 mmol/L Final     Potassium   Date Value Ref Range Status   12/14/2021 5.1 3.5 - 5.1 mmol/L Final   ]  Patient denies taking blood pressure medications on a regular basis at the same time of the day.     Current Outpatient Medications:     amLODIPine (NORVASC) 10 MG tablet, Take 1 tablet (10 mg total) by mouth once daily., Disp: 90 tablet, Rfl: 0  Does patient have record of home blood pressure readings no.   Last dose of blood pressure medication was taken at 7am.   Patient is asymptomatic.   BP- 154/96.       ,   .    Blood pressure reading after 15 minutes was 144/84, Pulse-68  Dr. Prather notified.

## 2022-01-24 ENCOUNTER — PATIENT MESSAGE (OUTPATIENT)
Dept: ADMINISTRATIVE | Facility: OTHER | Age: 47
End: 2022-01-24
Payer: COMMERCIAL

## 2022-01-26 NOTE — PROGRESS NOTES
[facility]  Follow-Up    The history is provided by the patient.                 Missing readings     Additional Follow-up details: Patient stated that she went to the [facility] on Monday to get a new BP cuff and she just needs to start taking her readings again. She stated that she will work on that this week. We reviewed the details of the program since it has been over a year that she has participated and taken readings. Patient is ready to get back on track and working to find the motivation. She experience personal issues which set her back a bit. She did also go on a diet which helped her loose 30lbs in 3 months but she stated that it was not sustainable so she is working to find ways to maintain a healthy diet. We did review some ideas which she was open to (meal prepping, healthy shake/smoothie options, setting alarms in her phone to remind her about meal times to keep meal times consistent. I encouraged patient to reach out as questions arise.             Diet-Change          Physical Activity-Not assessed    Medication Adherence  Medication Adherence not addressed.      Substance, Sleep, Stress-Not assessed      Additional monitoring needed.  Provided patient education.  Reviewed Device Techniques.     Addressed patient questions and patient has my contact information if needed prior to next outreach. Patient verbalizes understanding.      Explained the importance of self-monitoring and medication adherence. Encouraged the patient to communicate with their health  for lifestyle modifications to help improve or maintain a healthy lifestyle.            There are no preventive care reminders to display for this patient.       Last 5 Patient Entered Readings                Current 30 Day Average:   Recent Readings        SBP (mmHg) 148 144 134 111 126   DBP (mmHg) 91 98 96 87 85   Pulse 77 88 80 94 78

## 2022-02-04 ENCOUNTER — TELEPHONE (OUTPATIENT)
Dept: FAMILY MEDICINE | Facility: CLINIC | Age: 47
End: 2022-02-04
Payer: COMMERCIAL

## 2022-02-04 NOTE — TELEPHONE ENCOUNTER
Return call to Pt, and she states that she has been having chest pain for 2-3- day's. Pt describes it as a dull pain that comes and goes. No radiating pain, no shortness of breath. Pt states that last night it was a sharp pain to the left side of her chest. Pt informed that she will need to go to the ED or UC for evaluation and treatment. Pt acknowledged understanding.

## 2022-02-08 ENCOUNTER — PATIENT OUTREACH (OUTPATIENT)
Dept: ADMINISTRATIVE | Facility: OTHER | Age: 47
End: 2022-02-08
Payer: COMMERCIAL

## 2022-02-08 DIAGNOSIS — Z12.11 ENCOUNTER FOR SCREENING COLONOSCOPY: Primary | ICD-10-CM

## 2022-02-09 ENCOUNTER — OFFICE VISIT (OUTPATIENT)
Dept: OBSTETRICS AND GYNECOLOGY | Facility: CLINIC | Age: 47
End: 2022-02-09
Payer: COMMERCIAL

## 2022-02-09 VITALS
HEIGHT: 62 IN | WEIGHT: 264.44 LBS | DIASTOLIC BLOOD PRESSURE: 84 MMHG | SYSTOLIC BLOOD PRESSURE: 138 MMHG | BODY MASS INDEX: 48.66 KG/M2

## 2022-02-09 DIAGNOSIS — Z12.11 COLON CANCER SCREENING: Primary | ICD-10-CM

## 2022-02-09 DIAGNOSIS — Z01.419 WELL WOMAN EXAM WITH ROUTINE GYNECOLOGICAL EXAM: Primary | ICD-10-CM

## 2022-02-09 DIAGNOSIS — N92.6 IRREGULAR MENSTRUAL CYCLE: ICD-10-CM

## 2022-02-09 DIAGNOSIS — Z12.4 CERVICAL CANCER SCREENING: ICD-10-CM

## 2022-02-09 PROCEDURE — 88142 CYTOPATH C/V THIN LAYER: CPT | Performed by: OBSTETRICS & GYNECOLOGY

## 2022-02-09 PROCEDURE — 1160F PR REVIEW ALL MEDS BY PRESCRIBER/CLIN PHARMACIST DOCUMENTED: ICD-10-PCS | Mod: CPTII,S$GLB,, | Performed by: OBSTETRICS & GYNECOLOGY

## 2022-02-09 PROCEDURE — 3075F SYST BP GE 130 - 139MM HG: CPT | Mod: CPTII,S$GLB,, | Performed by: OBSTETRICS & GYNECOLOGY

## 2022-02-09 PROCEDURE — 3008F BODY MASS INDEX DOCD: CPT | Mod: CPTII,S$GLB,, | Performed by: OBSTETRICS & GYNECOLOGY

## 2022-02-09 PROCEDURE — 3075F PR MOST RECENT SYSTOLIC BLOOD PRESS GE 130-139MM HG: ICD-10-PCS | Mod: CPTII,S$GLB,, | Performed by: OBSTETRICS & GYNECOLOGY

## 2022-02-09 PROCEDURE — 1160F RVW MEDS BY RX/DR IN RCRD: CPT | Mod: CPTII,S$GLB,, | Performed by: OBSTETRICS & GYNECOLOGY

## 2022-02-09 PROCEDURE — 1159F PR MEDICATION LIST DOCUMENTED IN MEDICAL RECORD: ICD-10-PCS | Mod: CPTII,S$GLB,, | Performed by: OBSTETRICS & GYNECOLOGY

## 2022-02-09 PROCEDURE — 99386 PR PREVENTIVE VISIT,NEW,40-64: ICD-10-PCS | Mod: S$GLB,,, | Performed by: OBSTETRICS & GYNECOLOGY

## 2022-02-09 PROCEDURE — 1159F MED LIST DOCD IN RCRD: CPT | Mod: CPTII,S$GLB,, | Performed by: OBSTETRICS & GYNECOLOGY

## 2022-02-09 PROCEDURE — 3079F PR MOST RECENT DIASTOLIC BLOOD PRESSURE 80-89 MM HG: ICD-10-PCS | Mod: CPTII,S$GLB,, | Performed by: OBSTETRICS & GYNECOLOGY

## 2022-02-09 PROCEDURE — 3008F PR BODY MASS INDEX (BMI) DOCUMENTED: ICD-10-PCS | Mod: CPTII,S$GLB,, | Performed by: OBSTETRICS & GYNECOLOGY

## 2022-02-09 PROCEDURE — 99386 PREV VISIT NEW AGE 40-64: CPT | Mod: S$GLB,,, | Performed by: OBSTETRICS & GYNECOLOGY

## 2022-02-09 PROCEDURE — 3079F DIAST BP 80-89 MM HG: CPT | Mod: CPTII,S$GLB,, | Performed by: OBSTETRICS & GYNECOLOGY

## 2022-02-09 PROCEDURE — 99999 PR PBB SHADOW E&M-EST. PATIENT-LVL III: CPT | Mod: PBBFAC,,, | Performed by: OBSTETRICS & GYNECOLOGY

## 2022-02-09 PROCEDURE — 99999 PR PBB SHADOW E&M-EST. PATIENT-LVL III: ICD-10-PCS | Mod: PBBFAC,,, | Performed by: OBSTETRICS & GYNECOLOGY

## 2022-02-09 PROCEDURE — 87624 HPV HI-RISK TYP POOLED RSLT: CPT | Performed by: OBSTETRICS & GYNECOLOGY

## 2022-02-09 RX ORDER — SEMAGLUTIDE 0.25 MG/.5ML
INJECTION, SOLUTION SUBCUTANEOUS
COMMUNITY
Start: 2021-12-30 | End: 2022-02-15

## 2022-02-09 NOTE — PROGRESS NOTES
"[facility]  Ambulatory Clinic  Obstetrics & Gynecology    Visit Date:  2022    Chief Complaint:  Annual GYN exam    History of Present Illness:      Adama Ortiz is a 46 y.o.  here for a gynecologic exam.    Pt is s/p 3 failed embryo transfer of donor eggs at [facility], last attempt was 2021.    Pt reports irregular menses for past couple of months since spontaneous , last regular menses was 2021.   Prior to this time, menses are regular, not heavy or painful.  Pt has 2 more embryo and possible will attempt transfer later this year.  Pt current method of family planning is natural family planning.  Last pap ~2018 benign.  Pt denies active sexually transmitted infections.  Last mammo ~2021 benign.  Pt performs monthly self breast examination, non-smoker, uses seat belts, and denies abuse.   Pt denies vaginal discharge, dysmenorrhea, dyspareunia, pelvic pain, bloating, early satiety, unintentional weight loss, breast mass/skin changes, incontinence, GI or urinary complaints.    Otherwise, the pt is in her usual state of health.    Past History:  Gynecologic history as noted above.    Review of Systems:      GENERAL:  No fever, fatigue, excessive weight gain or loss  HEENT:  No headaches, hearing changes, visual disturbance  RESPIRATORY:  No cough, shortness of breath  CARDIOVASCULAR:  No chest pain, heart palpitations, leg swelling  BREAST:  No lump, pain, nipple discharge, skin changes  GASTROINTESTINAL:  No nausea, vomiting, constipation, diarrhea, abd pain, rectal bleeding   GENITOURINARY:  See HPI  ENDOCRINE:  No heat or cold intolerance  HEMATOLOGIC:  No easy bruisability or bleeding   LYMPHATICS:  No enlarged nodes  MUSCULOSKELETAL:  No acute joint pain or swelling  SKIN:  No rash, lesions, jaundice  NEUROLOGIC:  No dizziness, weakness, syncope  PSYCHIATRIC:  No significant mood changes, homicidal/suicidal ideations    Physical Exam:     /84   Ht 5' 2" " (1.575 m)   Wt 120 kg (264 lb 7.1 oz)   LMP 11/15/2021 (LMP Unknown)   BMI 48.37 kg/m²   Pulse 70's, Resp rate 18     GENERAL:  No acute distress, well-nourished  HEENT:  Atraumatic, anicteric, moist mucus membranes. Neck supple w/o masses.  BREAST:  Symmetric, nontender, no obvious masses, adenopathy, skin changes or nipple discharge.  LUNGS:  Clear normal respiratory effort  HEART:  Regular rate and rhythm  ABDOMEN:  Soft, non-tender, non-distended, no obvious organomegaly  EXT:  Symmetric w/o cramping, claudication, or edema. +2 distal pulses.  SKIN:  No rashes or bruising  PSYCH:  Mood and affect appropriate  NEURO:  Grossly intact bilaterally    GENITOURINARY:    VULVAR:  Female external genitalia w/o obvious lesions. Female hair distribution. Normal urethral meatus. No gross lymphadenopathy.    VAGINA:  Well estrogenized with good rugation. Normal lubrication. Good support. No obvious lesion. No discharge.  CERVIX:  No cervical motion tenderness, discharge, or obvious lesions.   UTERUS:  Small, non-tender, normal contour  ADNEXA:  No masses, non-tender    RECTAL:  Deferred. No obvious external lesions    Chaperone present for exam.    Assessment:     46 y.o. :    1. Well woman gynecologic exam  2. Irregular menses     Plan:    A gynecologic health assessment was performed with age appropriate counseling.    Cervical cancer screening - pap obtained.    Screening mammogram up to date.    Discussed natural course of irregular menses, suspect due to recent spontaneous .  Discussed possible medical mgt with OCP for menstrual regulation if irregular menses persists.  Pt advised to discuss this with [facility] pending possible embryo transfer later year.  Pt advised to f/u if her irregular menses persists.   Pelvic precautions.      Encourage healthy lifestyle modifications, monthly self breast exams, folic acid, Ca/Vit D, COVID vaccines, and colonoscopy.    F/u with PCP for health maintenance.    Return 1  year for gynecologic exam or sooner as needed.      All questions answered, pt voiced understanding.        Zachary Ceballos MD

## 2022-02-15 ENCOUNTER — OFFICE VISIT (OUTPATIENT)
Dept: FAMILY MEDICINE | Facility: CLINIC | Age: 47
End: 2022-02-15
Payer: COMMERCIAL

## 2022-02-15 DIAGNOSIS — F32.0 MAJOR DEPRESSIVE DISORDER, SINGLE EPISODE, MILD: ICD-10-CM

## 2022-02-15 DIAGNOSIS — R07.9 CHEST PAIN, UNSPECIFIED TYPE: Primary | ICD-10-CM

## 2022-02-15 DIAGNOSIS — E22.1 HYPERPROLACTINEMIA: ICD-10-CM

## 2022-02-15 LAB
HPV HR 12 DNA SPEC QL NAA+PROBE: NEGATIVE
HPV16 AG SPEC QL: NEGATIVE
HPV18 DNA SPEC QL NAA+PROBE: NEGATIVE

## 2022-02-15 PROCEDURE — 1160F RVW MEDS BY RX/DR IN RCRD: CPT | Mod: CPTII,95,, | Performed by: INTERNAL MEDICINE

## 2022-02-15 PROCEDURE — 99213 OFFICE O/P EST LOW 20 MIN: CPT | Mod: 95,,, | Performed by: INTERNAL MEDICINE

## 2022-02-15 PROCEDURE — 1159F PR MEDICATION LIST DOCUMENTED IN MEDICAL RECORD: ICD-10-PCS | Mod: CPTII,95,, | Performed by: INTERNAL MEDICINE

## 2022-02-15 PROCEDURE — 1159F MED LIST DOCD IN RCRD: CPT | Mod: CPTII,95,, | Performed by: INTERNAL MEDICINE

## 2022-02-15 PROCEDURE — 99213 PR OFFICE/OUTPT VISIT, EST, LEVL III, 20-29 MIN: ICD-10-PCS | Mod: 95,,, | Performed by: INTERNAL MEDICINE

## 2022-02-15 PROCEDURE — 1160F PR REVIEW ALL MEDS BY PRESCRIBER/CLIN PHARMACIST DOCUMENTED: ICD-10-PCS | Mod: CPTII,95,, | Performed by: INTERNAL MEDICINE

## 2022-02-15 NOTE — PROGRESS NOTES
SUBJECTIVE     No chief complaint on file.      HPI  Adama Ortiz is a 46 y.o. female with multiple medical diagnoses as listed in the medical history and problem list that presents for evaluation of chest pain on 2/4. Pt reports pain was a dull pain at a 2/10 intermittently without radiation at the L chest. It remained steady for 2 days and then became sharp with nausea, so she went to the ED at [facility] for further eval. Pt had labs and an EKG that were reportedly normal, but was told to f/u with Cardiology. She was not discharged with any meds. Chest pain did occur last Sunday while at rest and resolved without medical intervention. Denies any associated N/V, SOB, or diaphoresis.    PAST MEDICAL HISTORY:  Past Medical History:   Diagnosis Date    Hypertension        PAST SURGICAL HISTORY:  Past Surgical History:   Procedure Laterality Date    APPENDECTOMY      BREAST SURGERY      DILATION AND CURETTAGE OF UTERUS      TOTAL REDUCTION MAMMOPLASTY Bilateral 2006       SOCIAL HISTORY:  Social History     Socioeconomic History    Marital status:    Tobacco Use    Smoking status: Never Smoker    Smokeless tobacco: Never Used   Substance and Sexual Activity    Alcohol use: No     Alcohol/week: 0.0 standard drinks     Comment: occ    Drug use: No    Sexual activity: Yes     Partners: Male     Birth control/protection: None     Social Determinants of Health     Financial Resource Strain: Low Risk     Difficulty of Paying Living Expenses: Not very hard   Food Insecurity: No Food Insecurity    Worried About Running Out of Food in the Last Year: Never true    Ran Out of Food in the Last Year: Never true   Transportation Needs: No Transportation Needs    Lack of Transportation (Medical): No    Lack of Transportation (Non-Medical): No   Physical Activity: Insufficiently Active    Days of Exercise per Week: 2 days    Minutes of Exercise per Session: 20 min   Stress: Stress Concern Present    Feeling of  Stress : Rather much   Social Connections: Unknown    Frequency of Communication with Friends and Family: More than three times a week    Active Member of Clubs or Organizations: No    Attends Club or Organization Meetings: Never    Marital Status:    Housing Stability: Low Risk     Unable to Pay for Housing in the Last Year: No    Number of Places Lived in the Last Year: 1    Unstable Housing in the Last Year: No       FAMILY HISTORY:  Family History   Problem Relation Age of Onset    Hypertension Mother     Hyperlipidemia Mother        ALLERGIES AND MEDICATIONS: updated and reviewed.  Review of patient's allergies indicates:   Allergen Reactions    Aspirin Swelling    Keflex [cephalexin] Swelling and Rash     Current Outpatient Medications   Medication Sig Dispense Refill    amLODIPine (NORVASC) 10 MG tablet Take 1 tablet (10 mg total) by mouth once daily. 90 tablet 0     No current facility-administered medications for this visit.       ROS  Review of Systems   Constitutional: Negative for activity change and unexpected weight change.   HENT: Negative for hearing loss, rhinorrhea and trouble swallowing.    Eyes: Negative for discharge and visual disturbance.   Respiratory: Negative for chest tightness and wheezing.    Cardiovascular: Positive for chest pain. Negative for palpitations.   Gastrointestinal: Negative for blood in stool, constipation, diarrhea and vomiting.   Endocrine: Negative for polydipsia and polyuria.   Genitourinary: Negative for difficulty urinating, dysuria, hematuria and menstrual problem.   Musculoskeletal: Negative for arthralgias, joint swelling and neck pain.   Skin: Negative for rash and wound.   Neurological: Positive for headaches. Negative for weakness.   Psychiatric/Behavioral: Negative for confusion and dysphoric mood.         OBJECTIVE     Physical Exam  There were no vitals filed for this visit. There is no height or weight on file to calculate BMI.             Physical Exam  Constitutional:       General: She is not in acute distress.     Appearance: She is well-developed and well-nourished.   HENT:      Head: Normocephalic and atraumatic.      Right Ear: External ear normal.      Left Ear: External ear normal.      Nose: Nose normal.      Mouth/Throat:      Mouth: Oropharynx is clear and moist.   Eyes:      General: No scleral icterus.        Right eye: No discharge.         Left eye: No discharge.      Extraocular Movements: EOM normal.      Conjunctiva/sclera: Conjunctivae normal.   Neck:      Vascular: No JVD.      Trachea: No tracheal deviation.   Cardiovascular:      Pulses: Intact distal pulses.   Pulmonary:      Effort: Pulmonary effort is normal. No respiratory distress.   Musculoskeletal:         General: No deformity or edema. Normal range of motion.      Cervical back: Normal range of motion and neck supple.   Skin:     General: Skin is warm and dry.      Findings: No erythema or rash.   Neurological:      Mental Status: She is alert and oriented to person, place, and time.      Motor: No abnormal muscle tone.      Coordination: Coordination normal.   Psychiatric:         Mood and Affect: Mood and affect normal.         Behavior: Behavior normal.         Thought Content: Thought content normal.         Judgment: Judgment normal.           Health Maintenance       Date Due Completion Date    TETANUS VACCINE Never done ---    Colorectal Cancer Screening Never done ---    Influenza Vaccine (1) 06/30/2022 (Originally 9/1/2021) 10/7/2020    Mammogram 12/28/2022 12/28/2021    Pap Smear with HPV Cotest 10/05/2023 10/5/2018    Lipid Panel 12/14/2026 12/14/2021            ASSESSMENT     46 y.o. female with     1. Chest pain, unspecified type    2. Major depressive disorder, single episode, mild    3. BMI 45.0-49.9, adult    4. Hyperprolactinemia        PLAN:     1. Chest pain, unspecified type  - Pt needs f/u with Cardiology for stress test  - Ambulatory  referral/consult to Cardiology; Future    2. Major depressive disorder, single episode, mild  - Stable; no acute issues    3. BMI 45.0-49.9, adult  - Pt aware of importance of eating a prudent diet and exercising; will plan for weight loss medicine once cleared from Cards standpoint    4. Hyperprolactinemia  - Stable; no acute issues        RTC in 1-2 weeks as needed for any acute worsening of current condition or failure to improve       The patient location is: [place]  The chief complaint leading to consultation is:  F/u after ED visit for chest pain    Visit type: audiovisual    Face to Face time with patient: 10 min  15 minutes of total time spent on the encounter, which includes face to face time and non-face to face time preparing to see the patient (eg, review of tests), Obtaining and/or reviewing separately obtained history, Documenting clinical information in the electronic or other health record, Independently interpreting results (not separately reported) and communicating results to the patient/family/caregiver, or Care coordination (not separately reported).         Each patient to whom he or she provides medical services by telemedicine is:  (1) informed of the relationship between the physician and patient and the respective role of any other health care provider with respect to management of the patient; and (2) notified that he or she may decline to receive medical services by telemedicine and may withdraw from such care at any time.    Notes:       Trudi Sanchez MD  02/15/2022 3:06 PM        No follow-ups on file.

## 2022-02-16 LAB
FINAL PATHOLOGIC DIAGNOSIS: NORMAL
Lab: NORMAL

## 2022-02-22 ENCOUNTER — OFFICE VISIT (OUTPATIENT)
Dept: CARDIOLOGY | Facility: CLINIC | Age: 47
End: 2022-02-22
Payer: COMMERCIAL

## 2022-02-22 VITALS
RESPIRATION RATE: 15 BRPM | SYSTOLIC BLOOD PRESSURE: 144 MMHG | OXYGEN SATURATION: 98 % | DIASTOLIC BLOOD PRESSURE: 88 MMHG | WEIGHT: 264 LBS | HEART RATE: 73 BPM | BODY MASS INDEX: 48.58 KG/M2 | HEIGHT: 62 IN

## 2022-02-22 DIAGNOSIS — E66.01 CLASS 3 SEVERE OBESITY DUE TO EXCESS CALORIES WITH SERIOUS COMORBIDITY AND BODY MASS INDEX (BMI) OF 45.0 TO 49.9 IN ADULT: ICD-10-CM

## 2022-02-22 DIAGNOSIS — I10 PRIMARY HYPERTENSION: ICD-10-CM

## 2022-02-22 DIAGNOSIS — R94.31 EKG ABNORMALITY: ICD-10-CM

## 2022-02-22 DIAGNOSIS — R07.89 OTHER CHEST PAIN: Primary | ICD-10-CM

## 2022-02-22 PROCEDURE — 3079F DIAST BP 80-89 MM HG: CPT | Mod: CPTII,S$GLB,, | Performed by: INTERNAL MEDICINE

## 2022-02-22 PROCEDURE — 3008F BODY MASS INDEX DOCD: CPT | Mod: CPTII,S$GLB,, | Performed by: INTERNAL MEDICINE

## 2022-02-22 PROCEDURE — 1159F MED LIST DOCD IN RCRD: CPT | Mod: CPTII,S$GLB,, | Performed by: INTERNAL MEDICINE

## 2022-02-22 PROCEDURE — 3077F SYST BP >= 140 MM HG: CPT | Mod: CPTII,S$GLB,, | Performed by: INTERNAL MEDICINE

## 2022-02-22 PROCEDURE — 3079F PR MOST RECENT DIASTOLIC BLOOD PRESSURE 80-89 MM HG: ICD-10-PCS | Mod: CPTII,S$GLB,, | Performed by: INTERNAL MEDICINE

## 2022-02-22 PROCEDURE — 99999 PR PBB SHADOW E&M-EST. PATIENT-LVL IV: CPT | Mod: PBBFAC,,, | Performed by: INTERNAL MEDICINE

## 2022-02-22 PROCEDURE — 99999 PR PBB SHADOW E&M-EST. PATIENT-LVL IV: ICD-10-PCS | Mod: PBBFAC,,, | Performed by: INTERNAL MEDICINE

## 2022-02-22 PROCEDURE — 3077F PR MOST RECENT SYSTOLIC BLOOD PRESSURE >= 140 MM HG: ICD-10-PCS | Mod: CPTII,S$GLB,, | Performed by: INTERNAL MEDICINE

## 2022-02-22 PROCEDURE — 99203 PR OFFICE/OUTPT VISIT, NEW, LEVL III, 30-44 MIN: ICD-10-PCS | Mod: S$GLB,,, | Performed by: INTERNAL MEDICINE

## 2022-02-22 PROCEDURE — 3008F PR BODY MASS INDEX (BMI) DOCUMENTED: ICD-10-PCS | Mod: CPTII,S$GLB,, | Performed by: INTERNAL MEDICINE

## 2022-02-22 PROCEDURE — 1159F PR MEDICATION LIST DOCUMENTED IN MEDICAL RECORD: ICD-10-PCS | Mod: CPTII,S$GLB,, | Performed by: INTERNAL MEDICINE

## 2022-02-22 PROCEDURE — 99203 OFFICE O/P NEW LOW 30 MIN: CPT | Mod: S$GLB,,, | Performed by: INTERNAL MEDICINE

## 2022-02-22 NOTE — PROGRESS NOTES
CARDIOLOGY CONSULTATION    REASON FOR CONSULT:   Adama Ortiz is a 46 y.o. female who presents for evaluation of chest pain.      HISTORY OF PRESENT ILLNESS:     Adama Ortiz presents for evaluation of chest pain.  Hypertension.  On medication for few months.  Recently presented to the emergency room with complaints of chest pain.  Intermittent episodes.  Pressure-like sensation followed by sharp sensation.  Last a few minutes.  Wax and wane.  No aggravating or alleviating factors.  Went to [facility].  Other than history of hypertension has been undergoing fertility treatments.    CARDIOVASCULAR HISTORY:     None    PAST MEDICAL HISTORY:     Past Medical History:   Diagnosis Date    Hypertension        PAST SURGICAL HISTORY:     Past Surgical History:   Procedure Laterality Date    APPENDECTOMY      BREAST SURGERY      DILATION AND CURETTAGE OF UTERUS      TOTAL REDUCTION MAMMOPLASTY Bilateral 2006       ALLERGIES AND MEDICATION:     Review of patient's allergies indicates:   Allergen Reactions    Aspirin Swelling    Keflex [cephalexin] Swelling and Rash        Medication List          Accurate as of February 22, 2022  3:31 PM. If you have any questions, ask your nurse or doctor.            CONTINUE taking these medications    amLODIPine 10 MG tablet  Commonly known as: NORVASC  Take 1 tablet (10 mg total) by mouth once daily.            SOCIAL HISTORY:     Social History     Socioeconomic History    Marital status:    Tobacco Use    Smoking status: Never Smoker    Smokeless tobacco: Never Used   Substance and Sexual Activity    Alcohol use: No     Alcohol/week: 0.0 standard drinks     Comment: occ    Drug use: No    Sexual activity: Yes     Partners: Male     Birth control/protection: None     Social Determinants of Health     Financial Resource Strain: Low Risk     Difficulty of Paying Living Expenses: Not hard at all   Food Insecurity: No Food Insecurity    Worried About Running Out of Food  "in the Last Year: Never true    Ran Out of Food in the Last Year: Never true   Transportation Needs: No Transportation Needs    Lack of Transportation (Medical): No    Lack of Transportation (Non-Medical): No   Physical Activity: Insufficiently Active    Days of Exercise per Week: 2 days    Minutes of Exercise per Session: 10 min   Stress: Stress Concern Present    Feeling of Stress : To some extent   Social Connections: Unknown    Frequency of Communication with Friends and Family: More than three times a week    Frequency of Social Gatherings with Friends and Family: Patient refused    Active Member of Clubs or Organizations: No    Attends Club or Organization Meetings: Never    Marital Status:    Housing Stability: Low Risk     Unable to Pay for Housing in the Last Year: No    Number of Places Lived in the Last Year: 1    Unstable Housing in the Last Year: No       FAMILY HISTORY:     Family History   Problem Relation Age of Onset    Hypertension Mother     Hyperlipidemia Mother        REVIEW OF SYSTEMS:   Review of Systems   Respiratory: Negative for sputum production, shortness of breath and wheezing.    Cardiovascular: Positive for chest pain. Negative for palpitations, orthopnea, claudication, leg swelling and PND.   Gastrointestinal: Negative for abdominal pain, heartburn, nausea and vomiting.   Neurological: Negative for dizziness, speech change, focal weakness, loss of consciousness, weakness and headaches.       PHYSICAL EXAM:     Vitals:    02/22/22 1511   BP: (!) 144/88   Pulse: 73   Resp: 15    Body mass index is 48.29 kg/m².  Weight: 119.7 kg (264 lb)   Height: 5' 2" (157.5 cm)     Physical Exam  Constitutional:       General: She is not in acute distress.     Appearance: She is well-developed. She is obese. She is not diaphoretic.   HENT:      Head: Normocephalic.   Neck:      Vascular: No carotid bruit or JVD.   Cardiovascular:      Rate and Rhythm: Normal rate and regular " rhythm.      Pulses: Normal pulses.      Heart sounds: Normal heart sounds.   Pulmonary:      Effort: Pulmonary effort is normal.      Breath sounds: Normal breath sounds.   Abdominal:      General: Bowel sounds are normal.      Palpations: Abdomen is soft.      Tenderness: There is no abdominal tenderness.   Skin:     General: Skin is warm and dry.   Neurological:      Mental Status: She is alert and oriented to person, place, and time.   Psychiatric:         Speech: Speech normal.         Behavior: Behavior normal.         Thought Content: Thought content normal.         DATA:   EKG: (personally reviewed tracing)  02/22/2022-normal sinus rhythm.  Low voltage.    Laboratory:  CBC:  Recent Labs   Lab 10/07/20  1450 05/04/21  0232 12/14/21  0724   WBC 7.06 16.55 H 9.21   Hemoglobin 15.1 12.6 14.4   Hematocrit 45.2 37.7 43.6   Platelets 361 H 331 376       CHEMISTRIES:  Recent Labs   Lab 10/07/20  1450 05/04/21  0059 12/14/21  0724   Glucose 94 85 94   Sodium 139 138 141   Potassium 4.1 4.0 5.1   BUN 17 13 15   Creatinine 1.1 0.8 0.8   eGFR if African American >60 >60 >60   eGFR if non African American >60 >60 >60   Calcium 9.7 9.1 9.2       CARDIAC BIOMARKERS:        COAGS:        LIPIDS/LFTS:  Recent Labs   Lab 05/15/19  0725 10/07/20  1450 05/04/21  0059 12/14/21  0724   Cholesterol 186 181  --  209 H   Triglycerides 162 H 105  --  79   HDL 55 51  --  68   LDL Cholesterol 98.6 109.0  --  125.2   Non-HDL Cholesterol 131 130  --  141   AST 29 31 30 22   ALT 23 31 43 18       Cardiovascular Testing:      ASSESSMENT:     1. Chest pain  2. Hypertension  3. Obesity  4. Abnormal EKG    PLAN:     1. Chest pain:  Cardiovascular risk factor hypertension.  Secondary risk factor obesity.  Exercise stress echocardiogram for evaluation.  2. Hypertension:  Continue current management.  Monitor.  3. Return to clinic 1 month.            Flavio aMrch MD, MPH, FACC, [facility]

## 2022-03-08 ENCOUNTER — PATIENT MESSAGE (OUTPATIENT)
Dept: OTHER | Facility: OTHER | Age: 47
End: 2022-03-08
Payer: COMMERCIAL

## 2022-03-16 ENCOUNTER — HOSPITAL ENCOUNTER (OUTPATIENT)
Dept: CARDIOLOGY | Facility: HOSPITAL | Age: 47
Discharge: HOME | End: 2022-03-16
Attending: INTERNAL MEDICINE
Payer: COMMERCIAL

## 2022-03-16 DIAGNOSIS — E66.01 CLASS 3 SEVERE OBESITY DUE TO EXCESS CALORIES WITH SERIOUS COMORBIDITY AND BODY MASS INDEX (BMI) OF 45.0 TO 49.9 IN ADULT: ICD-10-CM

## 2022-03-16 DIAGNOSIS — R94.31 EKG ABNORMALITY: ICD-10-CM

## 2022-03-16 DIAGNOSIS — R07.89 OTHER CHEST PAIN: ICD-10-CM

## 2022-03-16 DIAGNOSIS — I10 PRIMARY HYPERTENSION: ICD-10-CM

## 2022-03-16 LAB
AORTIC ROOT ANNULUS: 2.44 CM
AORTIC VALVE CUSP SEPERATION: 1.85 CM
ASCENDING AORTA: 2.71 CM
AV INDEX (PROSTH): 0.91
AV MEAN GRADIENT: 3 MMHG
AV PEAK GRADIENT: 5 MMHG
AV VALVE AREA: 2.61 CM2
AV VELOCITY RATIO: 0.75
CV ECHO LV RWT: 0.61 CM
CV STRESS BASE HR: 90 BPM
DIASTOLIC BLOOD PRESSURE: 58 MMHG
DOP CALC AO PEAK VEL: 1.08 M/S
DOP CALC AO VTI: 21.64 CM
DOP CALC LVOT AREA: 2.9 CM2
DOP CALC LVOT DIAMETER: 1.91 CM
DOP CALC LVOT PEAK VEL: 0.81 M/S
DOP CALC LVOT STROKE VOLUME: 56.53 CM3
DOP CALCLVOT PEAK VEL VTI: 19.74 CM
E WAVE DECELERATION TIME: 168.72 MSEC
E/A RATIO: 1.6
E/E' RATIO: 13.33 M/S
ECHO LV POSTERIOR WALL: 1.12 CM (ref 0.6–1.1)
EJECTION FRACTION: 65 %
FRACTIONAL SHORTENING: 28 % (ref 28–44)
INTERVENTRICULAR SEPTUM: 1.16 CM (ref 0.6–1.1)
IVRT: 115.34 MSEC
LA MAJOR: 4.62 CM
LA MINOR: 4.49 CM
LA WIDTH: 3.19 CM
LEFT ATRIUM SIZE: 3.67 CM
LEFT ATRIUM VOLUME: 45.32 CM3
LEFT INTERNAL DIMENSION IN SYSTOLE: 2.68 CM (ref 2.1–4)
LEFT VENTRICLE DIASTOLIC VOLUME: 58.12 ML
LEFT VENTRICLE SYSTOLIC VOLUME: 26.5 ML
LEFT VENTRICULAR INTERNAL DIMENSION IN DIASTOLE: 3.7 CM (ref 3.5–6)
LEFT VENTRICULAR MASS: 136.38 G
LV LATERAL E/E' RATIO: 11.43 M/S
LV SEPTAL E/E' RATIO: 16 M/S
MV PEAK A VEL: 0.5 M/S
MV PEAK E VEL: 0.8 M/S
OHS CV CPX 1 MINUTE RECOVERY HEART RATE: 133 BPM
OHS CV CPX 85 PERCENT MAX PREDICTED HEART RATE MALE: 141
OHS CV CPX ESTIMATED METS: 7
OHS CV CPX MAX PREDICTED HEART RATE: 166
OHS CV CPX PATIENT IS FEMALE: 1
OHS CV CPX PATIENT IS MALE: 0
OHS CV CPX PEAK DIASTOLIC BLOOD PRESSURE: 64 MMHG
OHS CV CPX PEAK HEAR RATE: 169 BPM
OHS CV CPX PEAK RATE PRESSURE PRODUCT: ABNORMAL
OHS CV CPX PEAK SYSTOLIC BLOOD PRESSURE: 181 MMHG
OHS CV CPX PERCENT MAX PREDICTED HEART RATE ACHIEVED: 102
OHS CV CPX RATE PRESSURE PRODUCT PRESENTING: ABNORMAL
PISA TR MAX VEL: 1.34 M/S
PV PEAK VELOCITY: 0.86 CM/S
RA MAJOR: 4.4 CM
RA PRESSURE: 3 MMHG
RA WIDTH: 2.93 CM
RIGHT VENTRICULAR END-DIASTOLIC DIMENSION: 2.95 CM
RV TISSUE DOPPLER FREE WALL SYSTOLIC VELOCITY 1 (APICAL 4 CHAMBER VIEW): 9.01 CM/S
SINUS: 3.01 CM
STJ: 2.44 CM
STRESS ANGINA INDEX: 0
STRESS ECHO POST EXERCISE DUR MIN: 6 MINUTES
STRESS ECHO POST EXERCISE DUR SEC: 6 SECONDS
SYSTOLIC BLOOD PRESSURE: 127 MMHG
TDI LATERAL: 0.07 M/S
TDI SEPTAL: 0.05 M/S
TDI: 0.06 M/S
TR MAX PG: 7 MMHG
TRICUSPID ANNULAR PLANE SYSTOLIC EXCURSION: 1.8 CM
TV REST PULMONARY ARTERY PRESSURE: 10 MMHG

## 2022-03-16 PROCEDURE — 93325 DOPPLER ECHO COLOR FLOW MAPG: CPT

## 2022-03-16 PROCEDURE — 93325 DOPPLER ECHO COLOR FLOW MAPG: CPT | Mod: 26,,, | Performed by: INTERNAL MEDICINE

## 2022-03-16 PROCEDURE — 93320 DOPPLER ECHO COMPLETE: CPT | Mod: 26,,, | Performed by: INTERNAL MEDICINE

## 2022-03-16 PROCEDURE — 93325 STRESS ECHO (CUPID ONLY): ICD-10-PCS | Mod: 26,,, | Performed by: INTERNAL MEDICINE

## 2022-03-16 PROCEDURE — 93351 STRESS ECHO (CUPID ONLY): ICD-10-PCS | Mod: 26,,, | Performed by: INTERNAL MEDICINE

## 2022-03-16 PROCEDURE — 93351 STRESS TTE COMPLETE: CPT | Mod: 26,,, | Performed by: INTERNAL MEDICINE

## 2022-03-16 PROCEDURE — 93320 STRESS ECHO (CUPID ONLY): ICD-10-PCS | Mod: 26,,, | Performed by: INTERNAL MEDICINE

## 2022-03-21 ENCOUNTER — PATIENT OUTREACH (OUTPATIENT)
Dept: ADMINISTRATIVE | Facility: OTHER | Age: 47
End: 2022-03-21
Payer: COMMERCIAL

## 2022-03-23 ENCOUNTER — OFFICE VISIT (OUTPATIENT)
Dept: CARDIOLOGY | Facility: CLINIC | Age: 47
End: 2022-03-23
Payer: COMMERCIAL

## 2022-03-23 VITALS
WEIGHT: 267.88 LBS | HEART RATE: 95 BPM | OXYGEN SATURATION: 99 % | BODY MASS INDEX: 49.3 KG/M2 | SYSTOLIC BLOOD PRESSURE: 127 MMHG | RESPIRATION RATE: 16 BRPM | HEIGHT: 62 IN | DIASTOLIC BLOOD PRESSURE: 78 MMHG

## 2022-03-23 DIAGNOSIS — R07.89 OTHER CHEST PAIN: ICD-10-CM

## 2022-03-23 DIAGNOSIS — I10 PRIMARY HYPERTENSION: Primary | ICD-10-CM

## 2022-03-23 PROCEDURE — 3078F PR MOST RECENT DIASTOLIC BLOOD PRESSURE < 80 MM HG: ICD-10-PCS | Mod: CPTII,S$GLB,, | Performed by: INTERNAL MEDICINE

## 2022-03-23 PROCEDURE — 3078F DIAST BP <80 MM HG: CPT | Mod: CPTII,S$GLB,, | Performed by: INTERNAL MEDICINE

## 2022-03-23 PROCEDURE — 1159F PR MEDICATION LIST DOCUMENTED IN MEDICAL RECORD: ICD-10-PCS | Mod: CPTII,S$GLB,, | Performed by: INTERNAL MEDICINE

## 2022-03-23 PROCEDURE — 99999 PR PBB SHADOW E&M-EST. PATIENT-LVL IV: ICD-10-PCS | Mod: PBBFAC,,, | Performed by: INTERNAL MEDICINE

## 2022-03-23 PROCEDURE — 1159F MED LIST DOCD IN RCRD: CPT | Mod: CPTII,S$GLB,, | Performed by: INTERNAL MEDICINE

## 2022-03-23 PROCEDURE — 3074F SYST BP LT 130 MM HG: CPT | Mod: CPTII,S$GLB,, | Performed by: INTERNAL MEDICINE

## 2022-03-23 PROCEDURE — 99999 PR PBB SHADOW E&M-EST. PATIENT-LVL IV: CPT | Mod: PBBFAC,,, | Performed by: INTERNAL MEDICINE

## 2022-03-23 PROCEDURE — 1160F RVW MEDS BY RX/DR IN RCRD: CPT | Mod: CPTII,S$GLB,, | Performed by: INTERNAL MEDICINE

## 2022-03-23 PROCEDURE — 1160F PR REVIEW ALL MEDS BY PRESCRIBER/CLIN PHARMACIST DOCUMENTED: ICD-10-PCS | Mod: CPTII,S$GLB,, | Performed by: INTERNAL MEDICINE

## 2022-03-23 PROCEDURE — 3074F PR MOST RECENT SYSTOLIC BLOOD PRESSURE < 130 MM HG: ICD-10-PCS | Mod: CPTII,S$GLB,, | Performed by: INTERNAL MEDICINE

## 2022-03-23 PROCEDURE — 99214 PR OFFICE/OUTPT VISIT, EST, LEVL IV, 30-39 MIN: ICD-10-PCS | Mod: S$GLB,,, | Performed by: INTERNAL MEDICINE

## 2022-03-23 PROCEDURE — 99214 OFFICE O/P EST MOD 30 MIN: CPT | Mod: S$GLB,,, | Performed by: INTERNAL MEDICINE

## 2022-03-23 PROCEDURE — 3008F PR BODY MASS INDEX (BMI) DOCUMENTED: ICD-10-PCS | Mod: CPTII,S$GLB,, | Performed by: INTERNAL MEDICINE

## 2022-03-23 PROCEDURE — 3008F BODY MASS INDEX DOCD: CPT | Mod: CPTII,S$GLB,, | Performed by: INTERNAL MEDICINE

## 2022-03-23 NOTE — PROGRESS NOTES
CARDIOLOGY CLINIC VISIT        HISTORY OF PRESENT ILLNESS:       Adama Ortiz presents for continued care .  Seen 02/22/2022 for evaluation of chest pain.  Hypertension.  On medication for few months.  She had presented to the emergency room with complaints of chest pain.  Intermittent episodes.  Pressure-like sensation followed by sharp sensation.  Last a few minutes.  Wax and wane.  No aggravating or alleviating factors.  Went to [facility].  Exercise stress echocardiogram was negative for ischemia.  Normal left ventricular systolic function with estimated ejection fraction of 65%.  No pulmonary hypertension.  The patient exercised for 6 minutes 6 seconds.  7 Mets. No events since stress test.    CARDIOVASCULAR HISTORY:     None    PAST MEDICAL HISTORY:     Past Medical History:   Diagnosis Date    Hypertension        PAST SURGICAL HISTORY:     Past Surgical History:   Procedure Laterality Date    APPENDECTOMY      BREAST SURGERY      DILATION AND CURETTAGE OF UTERUS      TOTAL REDUCTION MAMMOPLASTY Bilateral 2006       ALLERGIES AND MEDICATION:     Review of patient's allergies indicates:   Allergen Reactions    Aspirin Swelling    Keflex [cephalexin] Swelling and Rash        Medication List          Accurate as of March 23, 2022  3:55 PM. If you have any questions, ask your nurse or doctor.            CONTINUE taking these medications    amLODIPine 10 MG tablet  Commonly known as: NORVASC  Take 1 tablet (10 mg total) by mouth once daily.            SOCIAL HISTORY:     Social History     Socioeconomic History    Marital status:    Tobacco Use    Smoking status: Never Smoker    Smokeless tobacco: Never Used   Substance and Sexual Activity    Alcohol use: No     Alcohol/week: 0.0 standard drinks     Comment: occ    Drug use: No    Sexual activity: Yes     Partners: Male     Birth control/protection: None     Social Determinants of Health     Financial Resource Strain: Low Risk     Difficulty of  "Paying Living Expenses: Not hard at all   Food Insecurity: No Food Insecurity    Worried About Running Out of Food in the Last Year: Never true    Ran Out of Food in the Last Year: Never true   Transportation Needs: No Transportation Needs    Lack of Transportation (Medical): No    Lack of Transportation (Non-Medical): No   Physical Activity: Insufficiently Active    Days of Exercise per Week: 2 days    Minutes of Exercise per Session: 10 min   Stress: Stress Concern Present    Feeling of Stress : To some extent   Social Connections: Unknown    Frequency of Communication with Friends and Family: More than three times a week    Frequency of Social Gatherings with Friends and Family: Patient refused    Active Member of Clubs or Organizations: No    Attends Club or Organization Meetings: Never    Marital Status:    Housing Stability: Low Risk     Unable to Pay for Housing in the Last Year: No    Number of Places Lived in the Last Year: 1    Unstable Housing in the Last Year: No       FAMILY HISTORY:     Family History   Problem Relation Age of Onset    Hypertension Mother     Hyperlipidemia Mother        REVIEW OF SYSTEMS:   Review of Systems   Respiratory: Negative for sputum production, shortness of breath and wheezing.    Cardiovascular: Positive for chest pain. Negative for palpitations, orthopnea, claudication, leg swelling and PND.   Gastrointestinal: Negative for abdominal pain, heartburn, nausea and vomiting.   Neurological: Negative for dizziness, speech change, focal weakness, loss of consciousness, weakness and headaches.       PHYSICAL EXAM:     Vitals:    03/23/22 1542   BP: 127/78   Pulse: 95   Resp: 16    Body mass index is 48.99 kg/m².  Weight: 121.5 kg (267 lb 13.7 oz)   Height: 5' 2" (157.5 cm)     Physical Exam  Constitutional:       General: She is not in acute distress.     Appearance: She is well-developed. She is obese. She is not diaphoretic.   HENT:      Head: " Normocephalic.   Neck:      Vascular: No carotid bruit or JVD.   Cardiovascular:      Rate and Rhythm: Normal rate and regular rhythm.      Pulses: Normal pulses.      Heart sounds: Normal heart sounds.   Pulmonary:      Effort: Pulmonary effort is normal.      Breath sounds: Normal breath sounds.   Abdominal:      General: Bowel sounds are normal.      Palpations: Abdomen is soft.      Tenderness: There is no abdominal tenderness.   Skin:     General: Skin is warm and dry.   Neurological:      Mental Status: She is alert and oriented to person, place, and time.   Psychiatric:         Speech: Speech normal.         Behavior: Behavior normal.         Thought Content: Thought content normal.         DATA:   EKG: (personally reviewed tracing)    Laboratory:  CBC:  Recent Labs   Lab 10/07/20  1450 05/04/21  0232 12/14/21  0724   WBC 7.06 16.55 H 9.21   Hemoglobin 15.1 12.6 14.4   Hematocrit 45.2 37.7 43.6   Platelets 361 H 331 376       CHEMISTRIES:  Recent Labs   Lab 10/07/20  1450 05/04/21  0059 12/14/21  0724   Glucose 94 85 94   Sodium 139 138 141   Potassium 4.1 4.0 5.1   BUN 17 13 15   Creatinine 1.1 0.8 0.8   eGFR if African American >60 >60 >60   eGFR if non African American >60 >60 >60   Calcium 9.7 9.1 9.2       CARDIAC BIOMARKERS:        COAGS:        LIPIDS/LFTS:  Recent Labs   Lab 05/15/19  0725 10/07/20  1450 05/04/21  0059 12/14/21  0724   Cholesterol 186 181  --  209 H   Triglycerides 162 H 105  --  79   HDL 55 51  --  68   LDL Cholesterol 98.6 109.0  --  125.2   Non-HDL Cholesterol 131 130  --  141   AST 29 31 30 22   ALT 23 31 43 18       Cardiovascular Testing:    Exercise stress echocardiogram 03/16/2022:    · The stress echo portion of this study is negative for myocardial ischemia.  · The ECG portion of this study is negative for myocardial ischemia.  · The patient's exercise capacity was below average.  · The test was stopped because the patient experienced fatigue. The patient reached the end of  the protocol.  · The left ventricle is normal in size with mild concentric hypertrophy and normal systolic function.  · There were no arrhythmias during stress.  · The estimated ejection fraction is 65%.  · Normal left ventricular diastolic function.  · Normal right ventricular size with normal right ventricular systolic function.  · Normal central venous pressure (3 mmHg).  · The estimated PA systolic pressure is 10 mmHg.  · There is no pulmonary hypertension.        ASSESSMENT:     1. Chest pain  2. Hypertension  3. Obesity  4. Abnormal EKG    PLAN:     1. Chest pain:  Exercise stress echocardiogram negative for ischemia.  2. Hypertension:  Continue current management.  Monitor.  3. Return to clinic 6 months.           Flavio March MD, MPH, FACC, [facility]

## 2022-05-30 ENCOUNTER — PATIENT MESSAGE (OUTPATIENT)
Dept: ADMINISTRATIVE | Facility: HOSPITAL | Age: 47
End: 2022-05-30
Payer: COMMERCIAL

## 2022-09-26 ENCOUNTER — OFFICE VISIT (OUTPATIENT)
Dept: FAMILY MEDICINE | Facility: CLINIC | Age: 47
End: 2022-09-26
Payer: COMMERCIAL

## 2022-09-26 VITALS
SYSTOLIC BLOOD PRESSURE: 154 MMHG | BODY MASS INDEX: 49.87 KG/M2 | RESPIRATION RATE: 18 BRPM | TEMPERATURE: 98 F | OXYGEN SATURATION: 98 % | DIASTOLIC BLOOD PRESSURE: 120 MMHG | WEIGHT: 271 LBS | HEIGHT: 62 IN | HEART RATE: 98 BPM

## 2022-09-26 DIAGNOSIS — M75.82 ROTATOR CUFF TENDINITIS, LEFT: Primary | ICD-10-CM

## 2022-09-26 DIAGNOSIS — I10 PRIMARY HYPERTENSION: ICD-10-CM

## 2022-09-26 DIAGNOSIS — M54.50 CHRONIC LEFT-SIDED LOW BACK PAIN WITHOUT SCIATICA: ICD-10-CM

## 2022-09-26 DIAGNOSIS — G89.29 CHRONIC LEFT-SIDED LOW BACK PAIN WITHOUT SCIATICA: ICD-10-CM

## 2022-09-26 DIAGNOSIS — Z12.11 ENCOUNTER FOR SCREENING COLONOSCOPY: ICD-10-CM

## 2022-09-26 DIAGNOSIS — Z23 NEEDS FLU SHOT: ICD-10-CM

## 2022-09-26 PROCEDURE — 3008F BODY MASS INDEX DOCD: CPT | Mod: CPTII,S$GLB,, | Performed by: INTERNAL MEDICINE

## 2022-09-26 PROCEDURE — 1160F RVW MEDS BY RX/DR IN RCRD: CPT | Mod: CPTII,S$GLB,, | Performed by: INTERNAL MEDICINE

## 2022-09-26 PROCEDURE — 3008F PR BODY MASS INDEX (BMI) DOCUMENTED: ICD-10-PCS | Mod: CPTII,S$GLB,, | Performed by: INTERNAL MEDICINE

## 2022-09-26 PROCEDURE — 90471 FLU VACCINE (QUAD) GREATER THAN OR EQUAL TO 3YO PRESERVATIVE FREE IM: ICD-10-PCS | Mod: S$GLB,,, | Performed by: INTERNAL MEDICINE

## 2022-09-26 PROCEDURE — 99214 PR OFFICE/OUTPT VISIT, EST, LEVL IV, 30-39 MIN: ICD-10-PCS | Mod: 25,S$GLB,, | Performed by: INTERNAL MEDICINE

## 2022-09-26 PROCEDURE — 3077F SYST BP >= 140 MM HG: CPT | Mod: CPTII,S$GLB,, | Performed by: INTERNAL MEDICINE

## 2022-09-26 PROCEDURE — 99214 OFFICE O/P EST MOD 30 MIN: CPT | Mod: 25,S$GLB,, | Performed by: INTERNAL MEDICINE

## 2022-09-26 PROCEDURE — 90471 IMMUNIZATION ADMIN: CPT | Mod: S$GLB,,, | Performed by: INTERNAL MEDICINE

## 2022-09-26 PROCEDURE — 3080F DIAST BP >= 90 MM HG: CPT | Mod: CPTII,S$GLB,, | Performed by: INTERNAL MEDICINE

## 2022-09-26 PROCEDURE — 1159F PR MEDICATION LIST DOCUMENTED IN MEDICAL RECORD: ICD-10-PCS | Mod: CPTII,S$GLB,, | Performed by: INTERNAL MEDICINE

## 2022-09-26 PROCEDURE — 90686 FLU VACCINE (QUAD) GREATER THAN OR EQUAL TO 3YO PRESERVATIVE FREE IM: ICD-10-PCS | Mod: S$GLB,,, | Performed by: INTERNAL MEDICINE

## 2022-09-26 PROCEDURE — 3077F PR MOST RECENT SYSTOLIC BLOOD PRESSURE >= 140 MM HG: ICD-10-PCS | Mod: CPTII,S$GLB,, | Performed by: INTERNAL MEDICINE

## 2022-09-26 PROCEDURE — 99999 PR PBB SHADOW E&M-EST. PATIENT-LVL IV: ICD-10-PCS | Mod: PBBFAC,,, | Performed by: INTERNAL MEDICINE

## 2022-09-26 PROCEDURE — 1160F PR REVIEW ALL MEDS BY PRESCRIBER/CLIN PHARMACIST DOCUMENTED: ICD-10-PCS | Mod: CPTII,S$GLB,, | Performed by: INTERNAL MEDICINE

## 2022-09-26 PROCEDURE — 90686 IIV4 VACC NO PRSV 0.5 ML IM: CPT | Mod: S$GLB,,, | Performed by: INTERNAL MEDICINE

## 2022-09-26 PROCEDURE — 99999 PR PBB SHADOW E&M-EST. PATIENT-LVL IV: CPT | Mod: PBBFAC,,, | Performed by: INTERNAL MEDICINE

## 2022-09-26 PROCEDURE — 3080F PR MOST RECENT DIASTOLIC BLOOD PRESSURE >= 90 MM HG: ICD-10-PCS | Mod: CPTII,S$GLB,, | Performed by: INTERNAL MEDICINE

## 2022-09-26 PROCEDURE — 1159F MED LIST DOCD IN RCRD: CPT | Mod: CPTII,S$GLB,, | Performed by: INTERNAL MEDICINE

## 2022-09-26 RX ORDER — TIZANIDINE 4 MG/1
4 TABLET ORAL EVERY 8 HOURS PRN
Qty: 30 TABLET | Refills: 0 | Status: SHIPPED | OUTPATIENT
Start: 2022-09-26 | End: 2022-10-06

## 2022-09-26 RX ORDER — METHYLPREDNISOLONE 4 MG/1
TABLET ORAL
Qty: 21 EACH | Refills: 0 | Status: SHIPPED | OUTPATIENT
Start: 2022-09-27 | End: 2023-01-27

## 2022-09-26 RX ORDER — AMLODIPINE BESYLATE 10 MG/1
10 TABLET ORAL NIGHTLY
Qty: 90 TABLET | Refills: 0 | Status: SHIPPED | OUTPATIENT
Start: 2022-09-26 | End: 2023-01-27 | Stop reason: SDUPTHER

## 2022-09-26 NOTE — PROGRESS NOTES
SUBJECTIVE     Chief Complaint   Patient presents with    Annual Exam       HPI  Adama Ortiz is a 47 y.o. female with multiple medical diagnoses as listed in the medical history and problem list that presents for evaluation of L lower back pain since 8/2022 and L shoulder pain x 4 months. Pt denies any preceding trauma, falls, or heavy lifting. Her L shoulder hurts with abduction only that is sharp at a 4/10 without radiation. Pt has been taking Tylenol without relief of pain. Her back pain is a dull, ache at a 3/10 constantly without radiation. It was previously TTP. Denies any urinary/fecal incontinence.     PAST MEDICAL HISTORY:  Past Medical History:   Diagnosis Date    Hypertension        PAST SURGICAL HISTORY:  Past Surgical History:   Procedure Laterality Date    APPENDECTOMY      BREAST SURGERY      DILATION AND CURETTAGE OF UTERUS      TOTAL REDUCTION MAMMOPLASTY Bilateral 2006       SOCIAL HISTORY:  Social History     Socioeconomic History    Marital status:    Tobacco Use    Smoking status: Never    Smokeless tobacco: Never   Substance and Sexual Activity    Alcohol use: No     Alcohol/week: 0.0 standard drinks     Comment: occ    Drug use: No    Sexual activity: Yes     Partners: Male     Birth control/protection: None     Social Determinants of Health     Financial Resource Strain: Low Risk     Difficulty of Paying Living Expenses: Not very hard   Food Insecurity: No Food Insecurity    Worried About Running Out of Food in the Last Year: Never true    Ran Out of Food in the Last Year: Never true   Transportation Needs: No Transportation Needs    Lack of Transportation (Medical): No    Lack of Transportation (Non-Medical): No   Physical Activity: Insufficiently Active    Days of Exercise per Week: 2 days    Minutes of Exercise per Session: 50 min   Stress: Stress Concern Present    Feeling of Stress : To some extent   Social Connections: Unknown    Frequency of Communication with Friends and  Family: More than three times a week    Frequency of Social Gatherings with Friends and Family: Patient refused    Active Member of Clubs or Organizations: No    Attends Club or Organization Meetings: Never    Marital Status:    Housing Stability: Low Risk     Unable to Pay for Housing in the Last Year: No    Number of Places Lived in the Last Year: 1    Unstable Housing in the Last Year: No       FAMILY HISTORY:  Family History   Problem Relation Age of Onset    Hypertension Mother     Hyperlipidemia Mother        ALLERGIES AND MEDICATIONS: updated and reviewed.  Review of patient's allergies indicates:   Allergen Reactions    Aspirin Swelling    Keflex [cephalexin] Swelling and Rash     Current Outpatient Medications   Medication Sig Dispense Refill    amLODIPine (NORVASC) 10 MG tablet Take 1 tablet (10 mg total) by mouth every evening. 90 tablet 0    [START ON 9/27/2022] methylPREDNISolone (MEDROL DOSEPACK) 4 mg tablet use as directed 21 each 0    tiZANidine (ZANAFLEX) 4 MG tablet Take 1 tablet (4 mg total) by mouth every 8 (eight) hours as needed (MAY CAUSE DROWSINESS; IF SO, ONLY TAKE NIGHTLY AS NEEDED). 30 tablet 0     No current facility-administered medications for this visit.       ROS  Review of Systems   Constitutional:  Positive for activity change. Negative for unexpected weight change.   HENT:  Negative for hearing loss, rhinorrhea and trouble swallowing.    Eyes:  Negative for discharge.   Respiratory:  Negative for chest tightness and wheezing.    Cardiovascular:  Negative for chest pain and palpitations.   Gastrointestinal:  Negative for blood in stool, constipation, diarrhea and vomiting.   Endocrine: Negative for polydipsia and polyuria.   Genitourinary:  Positive for menstrual problem. Negative for difficulty urinating, dysuria and hematuria.   Musculoskeletal:  Positive for arthralgias (L shoulder pain) and back pain. Negative for joint swelling and neck pain.   Skin:  Negative for rash  "and wound.   Neurological:  Positive for headaches. Negative for weakness.   Psychiatric/Behavioral:  Negative for confusion and dysphoric mood.        OBJECTIVE     Physical Exam  Vitals:    09/26/22 1145   BP: (!) 154/120   Pulse:    Resp:    Temp:     Body mass index is 49.57 kg/m².  Weight: 122.9 kg (271 lb)   Height: 5' 2" (157.5 cm)     Physical Exam  Constitutional:       General: She is not in acute distress.     Appearance: She is well-developed.   HENT:      Head: Normocephalic and atraumatic.      Right Ear: External ear normal.      Left Ear: External ear normal.      Nose: Nose normal.   Eyes:      General: No scleral icterus.        Right eye: No discharge.         Left eye: No discharge.      Conjunctiva/sclera: Conjunctivae normal.   Neck:      Vascular: No JVD.      Trachea: No tracheal deviation.   Cardiovascular:      Rate and Rhythm: Normal rate and regular rhythm.      Heart sounds: No murmur heard.    No friction rub. No gallop.   Pulmonary:      Effort: Pulmonary effort is normal. No respiratory distress.      Breath sounds: Normal breath sounds. No wheezing.   Abdominal:      General: Bowel sounds are normal. There is no distension.      Palpations: Abdomen is soft. There is no mass.      Tenderness: There is no abdominal tenderness. There is no guarding or rebound.   Musculoskeletal:         General: No tenderness or deformity.      Left shoulder: No tenderness or bony tenderness. Decreased range of motion (2/2 pain on abduction at 80 degrees).      Cervical back: Normal range of motion and neck supple. No bony tenderness.      Thoracic back: No bony tenderness.      Lumbar back: No bony tenderness. Negative right straight leg raise test and negative left straight leg raise test.   Skin:     General: Skin is warm and dry.      Findings: No erythema or rash.   Neurological:      Mental Status: She is alert and oriented to person, place, and time.      Motor: No abnormal muscle tone.      " Coordination: Coordination normal.   Psychiatric:         Behavior: Behavior normal.         Thought Content: Thought content normal.         Judgment: Judgment normal.         Health Maintenance         Date Due Completion Date    TETANUS VACCINE Never done ---    Colorectal Cancer Screening Never done ---    COVID-19 Vaccine (4 - Booster for Pfizer series) 12/08/2021 10/13/2021    Influenza Vaccine (1) 09/01/2022 10/7/2020    Mammogram 12/28/2022 12/28/2021    Lipid Panel 12/14/2026 12/14/2021    Cervical Cancer Screening 02/09/2027 2/9/2022              ASSESSMENT     47 y.o. female with     1. Rotator cuff tendinitis, left    2. Chronic left-sided low back pain without sciatica    3. Primary hypertension    4. Needs flu shot    5. Encounter for screening colonoscopy        PLAN:     1. Rotator cuff tendinitis, left  - Pt encouraged to apply ice packs 2-3 times daily at 10 minute intervals x 72 hours, then okay to change to heating compress with care not to burn her self; she  voiced understanding   - Pt rest and avoid heavy lifting of >5-10 lbs for the next 2 weeks  - methylPREDNISolone (MEDROL DOSEPACK) 4 mg tablet; use as directed  Dispense: 21 each; Refill: 0  - tiZANidine (ZANAFLEX) 4 MG tablet; Take 1 tablet (4 mg total) by mouth every 8 (eight) hours as needed (MAY CAUSE DROWSINESS; IF SO, ONLY TAKE NIGHTLY AS NEEDED).  Dispense: 30 tablet; Refill: 0    2. Chronic left-sided low back pain without sciatica  - As above  - methylPREDNISolone (MEDROL DOSEPACK) 4 mg tablet; use as directed  Dispense: 21 each; Refill: 0  - tiZANidine (ZANAFLEX) 4 MG tablet; Take 1 tablet (4 mg total) by mouth every 8 (eight) hours as needed (MAY CAUSE DROWSINESS; IF SO, ONLY TAKE NIGHTLY AS NEEDED).  Dispense: 30 tablet; Refill: 0    3. Primary hypertension  - BP elevated above goal of <140/90 due to non-compliance with meds  - Refills given today and pt strongly urged to take meds as prescribed; she voiced understanding  -  amLODIPine (NORVASC) 10 MG tablet; Take 1 tablet (10 mg total) by mouth every evening.  Dispense: 90 tablet; Refill: 0    4. Needs flu shot  - Influenza - Quadrivalent *Preferred* (6 months+) (PF)    5. Encounter for screening colonoscopy  - Fecal Immunochemical Test (iFOBT); Future          RTC in 1-2 weeks as needed for any acute worsening of current condition or failure to improve       Trudi Sanchez MD  09/26/2022 11:54 AM        No follow-ups on file.

## 2022-09-26 NOTE — PROGRESS NOTES
Patient tolerated injection well.  Instructed to remain in lobby for 15 minutes and to report any adverse reactions right away.  Verbalized understanding.

## 2022-10-05 ENCOUNTER — LAB VISIT (OUTPATIENT)
Dept: LAB | Facility: HOSPITAL | Age: 47
End: 2022-10-05
Attending: INTERNAL MEDICINE
Payer: COMMERCIAL

## 2022-10-05 DIAGNOSIS — Z12.11 ENCOUNTER FOR SCREENING COLONOSCOPY: ICD-10-CM

## 2022-10-05 PROCEDURE — 82274 ASSAY TEST FOR BLOOD FECAL: CPT | Performed by: INTERNAL MEDICINE

## 2022-10-10 ENCOUNTER — TELEPHONE (OUTPATIENT)
Dept: FAMILY MEDICINE | Facility: CLINIC | Age: 47
End: 2022-10-10

## 2022-10-10 ENCOUNTER — CLINICAL SUPPORT (OUTPATIENT)
Dept: FAMILY MEDICINE | Facility: CLINIC | Age: 47
End: 2022-10-10
Payer: COMMERCIAL

## 2022-10-10 VITALS — SYSTOLIC BLOOD PRESSURE: 128 MMHG | DIASTOLIC BLOOD PRESSURE: 100 MMHG

## 2022-10-10 DIAGNOSIS — I10 PRIMARY HYPERTENSION: ICD-10-CM

## 2022-10-10 DIAGNOSIS — I10 BENIGN ESSENTIAL HTN: Primary | ICD-10-CM

## 2022-10-10 PROCEDURE — 99999 PR PBB SHADOW E&M-EST. PATIENT-LVL I: CPT | Mod: PBBFAC,,,

## 2022-10-10 PROCEDURE — 99999 PR PBB SHADOW E&M-EST. PATIENT-LVL I: ICD-10-PCS | Mod: PBBFAC,,,

## 2022-10-10 RX ORDER — LOSARTAN POTASSIUM 50 MG/1
50 TABLET ORAL NIGHTLY
Qty: 30 TABLET | Refills: 0 | Status: SHIPPED | OUTPATIENT
Start: 2022-10-10 | End: 2023-01-27

## 2022-10-10 NOTE — TELEPHONE ENCOUNTER
----- Message from Trudi Sanchez MD sent at 10/10/2022 12:30 PM CDT -----  BP still elevated, so will add 2nd agent.  Return to clinic in 2-4 weeks for repeat nurse visit BP check.    Trudi  ----- Message -----  From: Aga Posada LPN  Sent: 10/10/2022   9:04 AM CDT  To: Trudi Sanchez MD

## 2022-10-10 NOTE — PROGRESS NOTES
Adama Ortiz 47 y.o. female is here today for Blood Pressure check.   History of HTN yes.    Review of patient's allergies indicates:   Allergen Reactions    Aspirin Swelling    Keflex [cephalexin] Swelling and Rash     Creatinine   Date Value Ref Range Status   12/14/2021 0.8 0.5 - 1.4 mg/dL Final     Sodium   Date Value Ref Range Status   12/14/2021 141 136 - 145 mmol/L Final     Potassium   Date Value Ref Range Status   12/14/2021 5.1 3.5 - 5.1 mmol/L Final   ]  Patient verifies taking blood pressure medications on a regular basis at the same time of the day.     Current Outpatient Medications:     amLODIPine (NORVASC) 10 MG tablet, Take 1 tablet (10 mg total) by mouth every evening., Disp: 90 tablet, Rfl: 0    methylPREDNISolone (MEDROL DOSEPACK) 4 mg tablet, use as directed, Disp: 21 each, Rfl: 0  Does patient have record of home blood pressure readings no.  Last dose of blood pressure medication was taken at 11:30pm on 10-9-2022.   Patient is asymptomatic.   BP- 128/92.       ,   .    Blood pressure reading after 15 minutes was 128/100,  Pulse- 72  Dr. Sanchez  notified.

## 2022-10-11 LAB — HEMOCCULT STL QL IA: NEGATIVE

## 2022-10-24 ENCOUNTER — CLINICAL SUPPORT (OUTPATIENT)
Dept: FAMILY MEDICINE | Facility: CLINIC | Age: 47
End: 2022-10-24
Payer: COMMERCIAL

## 2022-10-24 VITALS
SYSTOLIC BLOOD PRESSURE: 116 MMHG | RESPIRATION RATE: 16 BRPM | DIASTOLIC BLOOD PRESSURE: 58 MMHG | HEART RATE: 88 BPM | OXYGEN SATURATION: 97 %

## 2022-10-24 DIAGNOSIS — I10 BENIGN ESSENTIAL HTN: Primary | ICD-10-CM

## 2022-10-24 PROCEDURE — 99499 UNLISTED E&M SERVICE: CPT | Mod: S$GLB,,, | Performed by: INTERNAL MEDICINE

## 2022-10-24 PROCEDURE — 99999 PR PBB SHADOW E&M-EST. PATIENT-LVL II: CPT | Mod: PBBFAC,,,

## 2022-10-24 PROCEDURE — 99499 NO LOS: ICD-10-PCS | Mod: S$GLB,,, | Performed by: INTERNAL MEDICINE

## 2022-10-24 PROCEDURE — 99999 PR PBB SHADOW E&M-EST. PATIENT-LVL II: ICD-10-PCS | Mod: PBBFAC,,,

## 2022-10-24 NOTE — PROGRESS NOTES
Vitals:    10/24/22 0836   BP: (!) 116/58   BP Location: Left arm   Patient Position: Sitting   BP Method: Medium (Manual)   Pulse: 88   Resp: 16   SpO2: 97%    MD aware and patient notified to continue taking medication as ordered

## 2023-01-20 ENCOUNTER — PATIENT MESSAGE (OUTPATIENT)
Dept: ADMINISTRATIVE | Facility: OTHER | Age: 48
End: 2023-01-20
Payer: COMMERCIAL

## 2023-01-24 ENCOUNTER — OFFICE VISIT (OUTPATIENT)
Dept: PODIATRY | Facility: CLINIC | Age: 48
End: 2023-01-24
Payer: COMMERCIAL

## 2023-01-24 VITALS — BODY MASS INDEX: 49.86 KG/M2 | WEIGHT: 270.94 LBS | HEIGHT: 62 IN

## 2023-01-24 DIAGNOSIS — M79.672 PAIN IN BOTH FEET: Primary | ICD-10-CM

## 2023-01-24 DIAGNOSIS — M79.671 PAIN IN BOTH FEET: Primary | ICD-10-CM

## 2023-01-24 DIAGNOSIS — M72.2 PLANTAR FASCIITIS: ICD-10-CM

## 2023-01-24 DIAGNOSIS — M76.62 ACHILLES TENDINITIS OF LEFT LOWER EXTREMITY: ICD-10-CM

## 2023-01-24 PROCEDURE — 1159F MED LIST DOCD IN RCRD: CPT | Mod: CPTII,S$GLB,, | Performed by: PODIATRIST

## 2023-01-24 PROCEDURE — 3008F PR BODY MASS INDEX (BMI) DOCUMENTED: ICD-10-PCS | Mod: CPTII,S$GLB,, | Performed by: PODIATRIST

## 2023-01-24 PROCEDURE — 99999 PR PBB SHADOW E&M-EST. PATIENT-LVL III: ICD-10-PCS | Mod: PBBFAC,,, | Performed by: PODIATRIST

## 2023-01-24 PROCEDURE — 99203 PR OFFICE/OUTPT VISIT, NEW, LEVL III, 30-44 MIN: ICD-10-PCS | Mod: S$GLB,,, | Performed by: PODIATRIST

## 2023-01-24 PROCEDURE — 3008F BODY MASS INDEX DOCD: CPT | Mod: CPTII,S$GLB,, | Performed by: PODIATRIST

## 2023-01-24 PROCEDURE — 99203 OFFICE O/P NEW LOW 30 MIN: CPT | Mod: S$GLB,,, | Performed by: PODIATRIST

## 2023-01-24 PROCEDURE — 1159F PR MEDICATION LIST DOCUMENTED IN MEDICAL RECORD: ICD-10-PCS | Mod: CPTII,S$GLB,, | Performed by: PODIATRIST

## 2023-01-24 PROCEDURE — 99999 PR PBB SHADOW E&M-EST. PATIENT-LVL III: CPT | Mod: PBBFAC,,, | Performed by: PODIATRIST

## 2023-01-24 RX ORDER — METHYLPREDNISOLONE 4 MG/1
TABLET ORAL
Qty: 1 EACH | Refills: 0 | Status: SHIPPED | OUTPATIENT
Start: 2023-01-24 | End: 2023-02-14

## 2023-01-25 NOTE — PROGRESS NOTES
Subjective:      Patient ID: Adama Ortiz is a 47 y.o. female.    Chief Complaint: Heel Pain    Adama is a 47 y.o. female who presents to the podiatry clinic  with complaint of  bilateral foot pain. Onset of the symptoms was several months ago. Precipitating event: none known. Current symptoms include: ability to bear weight, but with some pain. Aggravating factors: any weight bearing. Symptoms have progressed to a point and plateaued. Patient has had no prior foot problems. Evaluation to date: none. Treatment to date: none. Patients rates pain 5/10 on pain scale.    Review of Systems   Constitutional: Negative for chills.   Cardiovascular:  Negative for chest pain and claudication.   Respiratory:  Negative for cough.    Skin:  Positive for color change, dry skin and nail changes.   Musculoskeletal:  Positive for joint pain.   Gastrointestinal:  Negative for nausea.   Neurological:  Positive for paresthesias. Negative for numbness.   Psychiatric/Behavioral:  The patient is not nervous/anxious.          Objective:      Physical Exam  Constitutional:       Appearance: She is well-developed.      Comments: Oriented to time, place, and person.   Cardiovascular:      Comments: DP and PT pulses are palpable bilaterally. 3 sec capillary refill time and toes and feet are warm to touch proximally .  There is  hair growth on the feet and toes b/l. There is no edema b/l. No spider veins or varicosities present b/l.     Musculoskeletal:      Comments: Equinus noted b/l ankles with < 10 deg DF noted. MMT 5/5 in DF/PF/Inv/Ev resistance with no reproduction of pain in any direction. Passive range of motion of ankle and pedal joints is painless b/l.    Pain on palpation plantar medial left heel, no pain with ROM or MMT or medial and lateral compression of heel, - tinel's sign    Decreased left  ankle joint ROM, pain with palpation of posterior 1/3 calcaneus at region of Achilles tendon insertion. No pain with ankle joint ROM      Right dorsal foot pain    Feet:      Right foot:      Skin integrity: No callus or dry skin.      Left foot:      Skin integrity: No callus or dry skin.   Lymphadenopathy:      Comments: Negative lymphadenopathy bilateral popliteal fossa and tarsal tunnel.   Skin:     Comments: No open lesions, lacerations or wounds noted.Interdigital spaces clean, dry and intact b/l. No erythema noted to b/l foot.  Nails normal color and trophic qualities.     Neurological:      Mental Status: She is alert.      Comments: Light touch, proprioception, and sharp/dull sensation are all intact bilaterally. Protective threshold with the [place]ien[place] monofilament is intact bilaterally.    Psychiatric:         Behavior: Behavior is cooperative.             Assessment:       Encounter Diagnoses   Name Primary?    Pain in both feet Yes    Achilles tendinitis of left lower extremity     Plantar fasciitis          Plan:       Adama was seen today for heel pain.    Diagnoses and all orders for this visit:    Pain in both feet  -     X-Ray Foot Complete Bilateral; Future  -     Ambulatory referral/consult to Physical/Occupational Therapy; Future    Achilles tendinitis of left lower extremity    Plantar fasciitis    Other orders  -     methylPREDNISolone (MEDROL, [place],) 4 mg tablet; use as directed      I counseled the patient on her conditions, their implications and medical management.      Discussed different treatment options for heel pain. including conservative and interventional.  I gave written and verbal instructions on heel cord stretching and this was demonstrated for the patient. Patient expressed understanding. Discussed wearing appropriate shoe gear and avoiding flats, slippers, sandals, barefoot, and sockfeet. Recommended arch supports. My recommendation for OTC supports is [employer], ASICS tennis shoes.       Patient instructed on adequate icing techniques. Patient should ice the affected area at least once per  day x 10 minutes for 10 days . I advised the  patient that extra icing would also be beneficial to ensure adequate anti inflammatory effect     Stretching handout dispensed to patient. Instructions on adequate stretching reviewed in clinic      - Patient will stretch the tendo achilles complex three times daily as demonstrated in the office to lengthen heel cord and increase motion at ankle offloading the load on the forefoot and MTPJ..  Literature was dispensed illustrating proper stretching technique.  - Patient will obtain over the counter arch supports and wear them in shoes whenever possible to support the arches and decrease motion at the MTPJ.      Referral placed to PT    B/L  foot xray to assess underlying deformity and for underlying osseous pathology.     Patient instructed on adequate icing techniques. Patient should ice the affected area at least once per day x 10 minutes for 10 days . I advised the patient that extra icing would also be beneficial to ensure adequate anti inflammatory effect      Medrol dose barb prescribed, advised patient to take meds as directed. Patient should complete medication. If problems occur notify the clinic       RTC PRN

## 2023-02-10 NOTE — PROGRESS NOTES
[facility]   Physical Therapy Initial Evaluation     Date: 2/13/2023   Name: Adama Ortiz  [facility] Number: [id number]    Therapy Diagnosis:   Encounter Diagnoses   Name Primary?    Pain in both feet     Muscle weakness     Plantar fasciitis of left foot     Left Achilles tendinitis      Physician: Oliva Butler DPM    Physician Orders: PT Eval and Treat   Medical Diagnosis from Referral: M79.671,M79.672 (ICD-10-CM) - Pain in both feet  Evaluation Date: 2/13/2023  Authorization Period Expiration: 1/24/2024  Plan of Care Expiration: 4/10/2023  Progress Note Due: 3/13/2023  Visit # / Visits authorized: 1/ 1       Precautions: Standard     Time In: 715a  Time Out: 800a  Total Appointment Time (timed & untimed codes): 45 minutes      SUBJECTIVE     Pt presents to PT w/ reports of nicole, L>R, foot pain.  Symptoms initially started with the L foot about 6 months ago insidiously. Thinks her R started about a month ago due to her changing how she walks to help favor that of the L foot.  Symptoms location along the L plantar feel, lateral foot, calf, and post knee.  Symptoms location along the R achilles up to the calf.  Aggravating factors include any weight bearing. States walking >20min will start to increase her symptoms.  Easing factors include sitting, rolling foot on frozen water bottle, and stretching.  Sleep has been going well but does have pain in the feet with first few steps in the morning.  She has a hx of L plantar fasciitis 'years' ago and has been doing good since. Wears sketchers for shoes. Has tried Asics and New Balance in the past but did not feel like they offered her much support nor help with her symptoms. She is thinking about trying [employer] out next.     Imaging, none:       Pain:  Current 4/10, worst 8/10, best 0/10     Patients goals: To get back to walking and exercising pain free.      Medical History:   Past Medical History:   Diagnosis Date    Hypertension         Surgical History:   Adama Ortiz  has a past surgical history that includes Breast surgery; Appendectomy; Dilation and curettage of uterus; and Total Reduction Mammoplasty (Bilateral, 2006).    Medications:   Adama has a current medication list which includes the following prescription(s): amlodipine and methylprednisolone.    Allergies:   Review of patient's allergies indicates:   Allergen Reactions    Aspirin Swelling    Keflex [cephalexin] Swelling and Rash          OBJECTIVE       Observation: L>R too many toe's sign, Dec arch height L>R.  Gait, early heel off L>R      Active Range of Motion:   Ankle Right Left   DF (knee extended) 8 2   Plantarflexion 45 35   Inversion 30 30   Eversion 10 10     Strength:  Ankle Right Left   Dorsiflexion 5/5 5/5   Plantarflexion 5/5 5/5   Inversion 5/5 5/5   Eversion 5/5 5/5     Single leg calf raise: R 5/25 (pain), L 0/25 (pain)  Single limb stance:  R 10sec, L 2 sec    Special Tests:  Windlass test: L+    Joint Mobility: Hypomobile Alvin TCJ AP     Palpation: TTP L gastroc, mid portion achilles, medial calcaneal tubercle. TTP R gastroc        Limitation/Restriction for FOTO foot Survey    Therapist reviewed FOTO scores for Adama Ortiz on 2/13/2023.   FOTO documents entered into [employer] - see Media section.    Limitation Score: 45%         TREATMENT     Total Treatment time (time-based codes) separate from Evaluation: 30 minutes      Adama received the treatments listed below:      therapeutic exercises to develop strength and ROM for 20 minutes including:  Calf str  Seated arch curls  Seated HR w/ ball squeeze    manual therapy techniques: Joint mobilizations were applied to the: Alvin feet for 10 minutes, including:  Alvin TCJ distraction gr3  Alvin TCJ AP gr3      PATIENT EDUCATION AND HOME EXERCISES     Education provided:   - Pt educated in dx, prognosis, POC, and HEP to include activity modification to aide in symptom modulation.     Written Home Exercises Provided:  yes. Exercises were reviewed and Adama was able to demonstrate them prior to the end of the session.  Adama demonstrated good  understanding of the education provided. See EMR under Patient Instructions for exercises provided during therapy sessions.    ASSESSMENT     Ms. Ortiz is a 48 y/o F presenting to PT w/ reports of nicole foot pain. Objective examination revealed decreased ankle ROM, calf strength, tenderness to palpation, and positive special testing consistent with L plantar fasciopathy with concomitant mid-portion achilles tendinopathy and R calf strain. Current limitations consist of difficulty with walking and stair negotiation. This impacts her ability with community ambulation and ADLs.  Pt would benefit from skilled PT to address above limitations to return to PLOF.    Patient prognosis is Good.   Patient will benefit from skilled outpatient Physical Therapy to address the deficits stated above and in the chart below, provide patient /family education, and to maximize patientt's level of independence.     Plan of care discussed with patient: Yes  Patient's spiritual, cultural and educational needs considered and patient is agreeable to the plan of care and goals as stated below:     Anticipated Barriers for therapy: none    Medical Necessity is demonstrated by the following  History  Co-morbidities and personal factors that may impact the plan of care Co-morbidities:   HTN    Personal Factors:   no deficits     moderate   Examination  Body Structures and Functions, activity limitations and participation restrictions that may impact the plan of care Body Regions:   lower extremities    Body Systems:    ROM  strength  balance  gait    Participation Restrictions:   walking    Activity limitations:   Learning and applying knowledge  no deficits    General Tasks and Commands  no deficits    Communication  no deficits    Mobility  walking    Self care  no deficits    Domestic Life  doing house work  (cleaning house, washing dishes, laundry)    Interactions/Relationships  no deficits    Life Areas  no deficits    Community and Social Life  no deficits         low   Clinical Presentation stable and uncomplicated low   Decision Making/ Complexity Score: low     Goals:  Short Term Goals:  Pt will be ind w/ HEP w/in 2 wks  Pt will report a dec of at least 2pts on 0-10 scale (per MCID) in foot pain within 3 wks for symptom modulation  3.   Pt will demonstrate L ankle DF AROM to at least 8deg for improved gait mechanics within 3 wks.     Long Term Goals:  Pt will demonstrate ability to perform 10 SL calf raises w/o heel pain for improved walking tolerance within 6 wks.   Pt will report being able to stand on her feet for at least 2 hrs w/o heel pain > 2/10 for improved tolerance with cooking within 6 wks.   Pt will report being able to walk for at least 1 hr w/o heel pain >2/10 for improved ability with grocery shopping within 6 wks.       PLAN   Plan of care Certification: 2/13/2023 to 4/10/2023.    Outpatient Physical Therapy 2 times weekly for 8 weeks to include the following interventions: Manual Therapy, Neuromuscular Re-ed, Patient Education, Therapeutic Activities, and Therapeutic Exercise.     Bernard Roblero, PT, DPT, OCS      I CERTIFY THE NEED FOR THESE SERVICES FURNISHED UNDER THIS PLAN OF TREATMENT AND WHILE UNDER MY CARE   Physician's comments:     Physician's Signature: ___________________________________________________

## 2023-02-13 ENCOUNTER — CLINICAL SUPPORT (OUTPATIENT)
Dept: REHABILITATION | Facility: HOSPITAL | Age: 48
End: 2023-02-13
Attending: PODIATRIST
Payer: COMMERCIAL

## 2023-02-13 DIAGNOSIS — M76.62 LEFT ACHILLES TENDINITIS: ICD-10-CM

## 2023-02-13 DIAGNOSIS — M79.671 PAIN IN BOTH FEET: ICD-10-CM

## 2023-02-13 DIAGNOSIS — M62.81 MUSCLE WEAKNESS: ICD-10-CM

## 2023-02-13 DIAGNOSIS — M72.2 PLANTAR FASCIITIS OF LEFT FOOT: ICD-10-CM

## 2023-02-13 DIAGNOSIS — M79.672 PAIN IN BOTH FEET: ICD-10-CM

## 2023-02-13 PROCEDURE — 97110 THERAPEUTIC EXERCISES: CPT | Mod: PN

## 2023-02-13 PROCEDURE — 97161 PT EVAL LOW COMPLEX 20 MIN: CPT | Mod: PN

## 2023-02-13 PROCEDURE — 97140 MANUAL THERAPY 1/> REGIONS: CPT | Mod: PN

## 2023-02-13 NOTE — PLAN OF CARE
[facility]   Physical Therapy Initial Evaluation     Date: 2/13/2023   Name: Adama Ortiz  [facility] Number: [id number]    Therapy Diagnosis:   Encounter Diagnoses   Name Primary?    Pain in both feet     Muscle weakness     Plantar fasciitis of left foot     Left Achilles tendinitis      Physician: Oliva Butler DPM    Physician Orders: PT Eval and Treat   Medical Diagnosis from Referral: M79.671,M79.672 (ICD-10-CM) - Pain in both feet  Evaluation Date: 2/13/2023  Authorization Period Expiration: 1/24/2024  Plan of Care Expiration: 4/10/2023  Progress Note Due: 3/13/2023  Visit # / Visits authorized: 1/ 1       Precautions: Standard     Time In: 715a  Time Out: 800a  Total Appointment Time (timed & untimed codes): 45 minutes      SUBJECTIVE     Pt presents to PT w/ reports of nicole, L>R, foot pain.  Symptoms initially started with the L foot about 6 months ago insidiously. Thinks her R started about a month ago due to her changing how she walks to help favor that of the L foot.  Symptoms location along the L plantar feel, lateral foot, calf, and post knee.  Symptoms location along the R achilles up to the calf.  Aggravating factors include any weight bearing. States walking >20min will start to increase her symptoms.  Easing factors include sitting, rolling foot on frozen water bottle, and stretching.  Sleep has been going well but does have pain in the feet with first few steps in the morning.  She has a hx of L plantar fasciitis 'years' ago and has been doing good since. Wears sketchers for shoes. Has tried [employer] and [employer] in the past but did not feel like they offered her much support nor help with her symptoms. She is thinking about trying [employer] out next.     Imaging, none:       Pain:  Current 4/10, worst 8/10, best 0/10     Patients goals: To get back to walking and exercising pain free.      Medical History:   Past Medical History:   Diagnosis Date    Hypertension         Surgical History:   Adama Ortiz  has a past surgical history that includes Breast surgery; Appendectomy; Dilation and curettage of uterus; and Total Reduction Mammoplasty (Bilateral, 2006).    Medications:   Adama has a current medication list which includes the following prescription(s): amlodipine and methylprednisolone.    Allergies:   Review of patient's allergies indicates:   Allergen Reactions    Aspirin Swelling    Keflex [cephalexin] Swelling and Rash          OBJECTIVE       Observation: L>R too many toe's sign, Dec arch height L>R.  Gait, early heel off L>R      Active Range of Motion:   Ankle Right Left   DF (knee extended) 8 2   Plantarflexion 45 35   Inversion 30 30   Eversion 10 10     Strength:  Ankle Right Left   Dorsiflexion 5/5 5/5   Plantarflexion 5/5 5/5   Inversion 5/5 5/5   Eversion 5/5 5/5     Single leg calf raise: R 5/25 (pain), L 0/25 (pain)  Single limb stance:  R 10sec, L 2 sec    Special Tests:  Windlass test: L+    Joint Mobility: Hypomobile Alvin TCJ AP     Palpation: TTP L gastroc, mid portion achilles, medial calcaneal tubercle. TTP R gastroc        Limitation/Restriction for FOTO foot Survey    Therapist reviewed FOTO scores for Adama Ortiz on 2/13/2023.   FOTO documents entered into [employer] - see Media section.    Limitation Score: 45%         TREATMENT     Total Treatment time (time-based codes) separate from Evaluation: 30 minutes      Adama received the treatments listed below:      therapeutic exercises to develop strength and ROM for 20 minutes including:  Calf str  Seated arch curls  Seated HR w/ ball squeeze    manual therapy techniques: Joint mobilizations were applied to the: Alvni feet for 10 minutes, including:  Alvin TCJ distraction gr3  Alvin TCJ AP gr3      PATIENT EDUCATION AND HOME EXERCISES     Education provided:   - Pt educated in dx, prognosis, POC, and HEP to include activity modification to aide in symptom modulation.     Written Home Exercises Provided:  yes. Exercises were reviewed and Adama was able to demonstrate them prior to the end of the session.  Adama demonstrated good  understanding of the education provided. See EMR under Patient Instructions for exercises provided during therapy sessions.    ASSESSMENT     Ms. Ortiz is a 48 y/o F presenting to PT w/ reports of nicole foot pain. Objective examination revealed decreased ankle ROM, calf strength, tenderness to palpation, and positive special testing consistent with L plantar fasciopathy with concomitant mid-portion achilles tendinopathy and R calf strain. Current limitations consist of difficulty with walking and stair negotiation. This impacts her ability with community ambulation and ADLs.  Pt would benefit from skilled PT to address above limitations to return to PLOF.    Patient prognosis is Good.   Patient will benefit from skilled outpatient Physical Therapy to address the deficits stated above and in the chart below, provide patient /family education, and to maximize patientt's level of independence.     Plan of care discussed with patient: Yes  Patient's spiritual, cultural and educational needs considered and patient is agreeable to the plan of care and goals as stated below:     Anticipated Barriers for therapy: none    Medical Necessity is demonstrated by the following  History  Co-morbidities and personal factors that may impact the plan of care Co-morbidities:   HTN    Personal Factors:   no deficits     moderate   Examination  Body Structures and Functions, activity limitations and participation restrictions that may impact the plan of care Body Regions:   lower extremities    Body Systems:    ROM  strength  balance  gait    Participation Restrictions:   walking    Activity limitations:   Learning and applying knowledge  no deficits    General Tasks and Commands  no deficits    Communication  no deficits    Mobility  walking    Self care  no deficits    Domestic Life  doing house work  (cleaning house, washing dishes, laundry)    Interactions/Relationships  no deficits    Life Areas  no deficits    Community and Social Life  no deficits         low   Clinical Presentation stable and uncomplicated low   Decision Making/ Complexity Score: low     Goals:  Short Term Goals:  Pt will be ind w/ HEP w/in 2 wks  Pt will report a dec of at least 2pts on 0-10 scale (per MCID) in foot pain within 3 wks for symptom modulation  3.   Pt will demonstrate L ankle DF AROM to at least 8deg for improved gait mechanics within 3 wks.     Long Term Goals:  Pt will demonstrate ability to perform 10 SL calf raises w/o heel pain for improved walking tolerance within 6 wks.   Pt will report being able to stand on her feet for at least 2 hrs w/o heel pain > 2/10 for improved tolerance with cooking within 6 wks.   Pt will report being able to walk for at least 1 hr w/o heel pain >2/10 for improved ability with grocery shopping within 6 wks.       PLAN   Plan of care Certification: 2/13/2023 to 4/10/2023.    Outpatient Physical Therapy 2 times weekly for 8 weeks to include the following interventions: Manual Therapy, Neuromuscular Re-ed, Patient Education, Therapeutic Activities, and Therapeutic Exercise.     Bernard Roblero, PT, DPT, OCS      I CERTIFY THE NEED FOR THESE SERVICES FURNISHED UNDER THIS PLAN OF TREATMENT AND WHILE UNDER MY CARE   Physician's comments:     Physician's Signature: ___________________________________________________

## 2023-02-16 ENCOUNTER — CLINICAL SUPPORT (OUTPATIENT)
Dept: REHABILITATION | Facility: HOSPITAL | Age: 48
End: 2023-02-16
Attending: PODIATRIST
Payer: COMMERCIAL

## 2023-02-16 DIAGNOSIS — M79.671 BILATERAL FOOT PAIN: Primary | ICD-10-CM

## 2023-02-16 DIAGNOSIS — M79.672 BILATERAL FOOT PAIN: Primary | ICD-10-CM

## 2023-02-16 DIAGNOSIS — M72.2 PLANTAR FASCIITIS OF LEFT FOOT: ICD-10-CM

## 2023-02-16 DIAGNOSIS — M76.62 LEFT ACHILLES TENDINITIS: ICD-10-CM

## 2023-02-16 DIAGNOSIS — M62.81 MUSCLE WEAKNESS: ICD-10-CM

## 2023-02-16 PROCEDURE — 97140 MANUAL THERAPY 1/> REGIONS: CPT | Mod: PN,CQ

## 2023-02-16 PROCEDURE — 97110 THERAPEUTIC EXERCISES: CPT | Mod: PN,CQ

## 2023-02-16 NOTE — PROGRESS NOTES
"[facility] OUTPATIENT THERAPY AND WELLNESS   Physical Therapy Treatment Note     Name: Adama Ortiz  Clinic Number: [id number]    Therapy Diagnosis: No diagnosis found.  Physician: Oliva Butler DPM    Visit Date: 2/16/2023      Therapy Diagnosis:        Encounter Diagnoses   Name Primary?    Pain in both feet      Muscle weakness      Plantar fasciitis of left foot      Left Achilles tendinitis        Physician: Oliva Butler DPM     Physician Orders: PT Eval and Treat   Medical Diagnosis from Referral: M79.671,M79.672 (ICD-10-CM) - Pain in both feet  Evaluation Date: 2/13/2023  Authorization Period Expiration: 1/24/2024  Plan of Care Expiration: 4/10/2023  Progress Note Due: 3/13/2023  Visit # / Visits authorized: 2/eval + 25      PTA Visit: 1/5    Precautions: Standard      Time In: 9:30 am   Time Out: 10:30 am   Total Appointment Time (timed & untimed codes): 60 minutes (3TE, 1MT)         SUBJECTIVE      Pt reports: "I feel really good today.  This is the first time I got out of bed and my feet hit the floor and didn't hurt!"  She was compliant with home exercise program.  Response to previous treatment: she was a little sore  Functional change: N/A    Pain: 1/10  Location: bilateral feet      OBJECTIVE     Objective Measures updated at progress report unless specified.     Treatment     Adama received the treatments listed below:      therapeutic exercises to develop strength and ROM for 50 minutes including:  Calf stretch 3xs 30"hold  +Quad Sets 30x  5" hold  +Supine SLR 2x6 3"hold  +Supine Hip Abd 3x10 GTB 5"hold  Seated HR w/ ball squeeze  +AROM PF/DF/INV/EVR 20x  +Seated Heel Raises 2x10  Seated Towel Curls  30x  +Seated Towel Slide (Inv/Evr) 2x19     manual therapy techniques: Joint mobilizations were applied to the: Alvin feet for 10 minutes, including:  Alvin TCJ distraction gr3  Alvin TCJ AP gr3    neuromuscular re-education activities to improve: Balance, Coordination, and Proprioception for 00 " minutes. The following activities were included:      therapeutic activities to improve functional performance for 00  minutes, including:      gait training to improve functional mobility and safety for 00  minutes, including:    .        Patient Education and Home Exercises     Education provided:   - Pt educated in dx, prognosis, POC, and HEP to include activity modification to aide in symptom modulation.      Written Home Exercises Provided: yes. Exercises were reviewed and Adama was able to demonstrate them prior to the end of the session.  Adama demonstrated good  understanding of the education provided. See EMR under Patient Instructions for exercises provided during therapy sessions.      ASSESSMENT   Ms. Ortiz presented to PT reporting decreased nicole foot pain.  Introduced therex for improved nicole LE strength for improved functional mobility, as well as therex for improved foot intrinsic muscle strength. She was challenged with performing Hip Add with ball, denoted by increased mm fatigue.  Overall, Ms. Ortiz responded well to today's session, reporting no increased pain or discomfort.  Will continue to progress foot and ankle mm strength, as well as ROM for decreased nicole foot pain and return to ADL's.       Patient prognosis is Good.   Patient will benefit from skilled outpatient Physical Therapy to address the deficits stated above and in the chart below, provide patient /family education, and to maximize patientt's level of independence.      Plan of care discussed with patient: Yes  Patient's spiritual, cultural and educational needs considered and patient is agreeable to the plan of care and goals as stated below:      Anticipated Barriers for therapy: none     Goals:   Short Term Goals:  Pt will be ind w/ HEP w/in 2 wks  Pt will report a dec of at least 2pts on 0-10 scale (per MCID) in foot pain within 3 wks for symptom modulation  3.   Pt will demonstrate L ankle DF AROM to at least 8deg for  improved gait mechanics within 3 wks.      Long Term Goals:  Pt will demonstrate ability to perform 10 SL calf raises w/o heel pain for improved walking tolerance within 6 wks.   Pt will report being able to stand on her feet for at least 2 hrs w/o heel pain > 2/10 for improved tolerance with cooking within 6 wks.   Pt will report being able to walk for at least 1 hr w/o heel pain >2/10 for improved ability with grocery shopping within 6 wks.        PLAN     Continue to improve nicole ankle ROM and strength.    Melissa Drummond, PTA

## 2023-02-20 ENCOUNTER — CLINICAL SUPPORT (OUTPATIENT)
Dept: REHABILITATION | Facility: HOSPITAL | Age: 48
End: 2023-02-20
Attending: PODIATRIST
Payer: COMMERCIAL

## 2023-02-20 DIAGNOSIS — M79.671 BILATERAL FOOT PAIN: Primary | ICD-10-CM

## 2023-02-20 DIAGNOSIS — M72.2 PLANTAR FASCIITIS OF LEFT FOOT: ICD-10-CM

## 2023-02-20 DIAGNOSIS — M62.81 MUSCLE WEAKNESS: ICD-10-CM

## 2023-02-20 DIAGNOSIS — M76.62 LEFT ACHILLES TENDINITIS: ICD-10-CM

## 2023-02-20 DIAGNOSIS — M79.672 BILATERAL FOOT PAIN: Primary | ICD-10-CM

## 2023-02-20 PROCEDURE — 97110 THERAPEUTIC EXERCISES: CPT | Mod: PN,CQ

## 2023-02-20 PROCEDURE — 97140 MANUAL THERAPY 1/> REGIONS: CPT | Mod: PN,CQ

## 2023-02-20 NOTE — PROGRESS NOTES
"[facility]   Physical Therapy Treatment Note     Name: Adama Ortiz  Clinic Number: [id number]    Therapy Diagnosis: No diagnosis found.  Physician: Oliva Butler DPM    Visit Date: 2/20/2023      Therapy Diagnosis:        Encounter Diagnoses   Name Primary?    Pain in both feet      Muscle weakness      Plantar fasciitis of left foot      Left Achilles tendinitis        Physician: Oliva Butler DPM     Physician Orders: PT Eval and Treat   Medical Diagnosis from Referral: M79.671,M79.672 (ICD-10-CM) - Pain in both feet  Evaluation Date: 2/13/2023  Authorization Period Expiration: 1/24/2024  Plan of Care Expiration: 4/10/2023  Progress Note Due: 3/13/2023  Visit # / Visits authorized: 2/eval + 25      PTA Visit: 2/5    Precautions: Standard      Time In: 10:45 am   Time Out: 11:45 am   Total Appointment Time (timed & untimed codes): 60 minutes (3TE, 1MT)         SUBJECTIVE      Pt reports: I'm hurting a lot more today.  My PCP said that I needed to walk 30 minutes a day, so I've been doing that and it hurts a lot worse.   She was compliant with home exercise program.  Response to previous treatment: she was a little sore  Functional change: N/A    Pain: 6/10  Location: bilateral feet      OBJECTIVE     Objective Measures updated at progress report unless specified.     Treatment     Adama received the treatments listed below:      therapeutic exercises to develop strength and ROM for 50 minutes including:  Calf stretch 3xs 30"hold  Quad Sets 30x  5" hold  Supine SLR 2x6 3"hold  Supine Hip Abd 3x10 BTB 5"hold  Seated HR w/ ball squeeze  AROM PF/DF/INV/EVR 20x  Seated Heel Raises 3x10  +Seated toe raises 2x10  Seated Towel Curls  30x  Seated Towel Slide (Inv/Evr) 2x19     manual therapy techniques: Joint mobilizations were applied to the: Alvin feet for 10 minutes, including:  Alvin TCJ distraction gr3  Alvin TCJ AP gr3    neuromuscular re-education activities to improve: Balance, " Coordination, and Proprioception for 00 minutes. The following activities were included:      therapeutic activities to improve functional performance for 00  minutes, including:      gait training to improve functional mobility and safety for 00  minutes, including:    .        Patient Education and Home Exercises     Education provided:   - Pt educated in dx, prognosis, POC, and HEP to include activity modification to aide in symptom modulation.      Written Home Exercises Provided: yes. Exercises were reviewed and Adama was able to demonstrate them prior to the end of the session.  Adama demonstrated good  understanding of the education provided. See EMR under Patient Instructions for exercises provided during therapy sessions.      ASSESSMENT     Adama presented to PT reporting increased nicole foot pain which she attributes to increased walking earlier in the week.  Noted increased mm guarding and restrictions during left TCJ AP, with Adama reporting increasing left heel pain. Advised Adama to slowly build up tolerance to walking increased distances to avoid increased plantar fascia irritation and pain. Minimal progressions today secondary to Adama presenting with increased pain.  Will continue to progress LE strength and TCJ ROM as tolerated.          Patient prognosis is Good.   Patient will benefit from skilled outpatient Physical Therapy to address the deficits stated above and in the chart below, provide patient /family education, and to maximize patientt's level of independence.      Plan of care discussed with patient: Yes  Patient's spiritual, cultural and educational needs considered and patient is agreeable to the plan of care and goals as stated below:      Anticipated Barriers for therapy: none     Goals:   Short Term Goals:  Pt will be ind w/ HEP w/in 2 wks  Pt will report a dec of at least 2pts on 0-10 scale (per MCID) in foot pain within 3 wks for symptom modulation  3.   Pt will  demonstrate L ankle DF AROM to at least 8deg for improved gait mechanics within 3 wks.      Long Term Goals:  Pt will demonstrate ability to perform 10 SL calf raises w/o heel pain for improved walking tolerance within 6 wks.   Pt will report being able to stand on her feet for at least 2 hrs w/o heel pain > 2/10 for improved tolerance with cooking within 6 wks.   Pt will report being able to walk for at least 1 hr w/o heel pain >2/10 for improved ability with grocery shopping within 6 wks.        PLAN     Continue to improve nicole ankle ROM and strength.    Melissa Drummond, PTA

## 2023-02-24 ENCOUNTER — CLINICAL SUPPORT (OUTPATIENT)
Dept: REHABILITATION | Facility: HOSPITAL | Age: 48
End: 2023-02-24
Attending: PODIATRIST
Payer: COMMERCIAL

## 2023-02-24 DIAGNOSIS — M79.672 BILATERAL FOOT PAIN: Primary | ICD-10-CM

## 2023-02-24 DIAGNOSIS — M72.2 PLANTAR FASCIITIS OF LEFT FOOT: ICD-10-CM

## 2023-02-24 DIAGNOSIS — M79.671 BILATERAL FOOT PAIN: Primary | ICD-10-CM

## 2023-02-24 DIAGNOSIS — M62.81 MUSCLE WEAKNESS: ICD-10-CM

## 2023-02-24 DIAGNOSIS — M76.62 LEFT ACHILLES TENDINITIS: ICD-10-CM

## 2023-02-24 PROCEDURE — 97112 NEUROMUSCULAR REEDUCATION: CPT | Mod: PN

## 2023-02-24 PROCEDURE — 97140 MANUAL THERAPY 1/> REGIONS: CPT | Mod: PN

## 2023-02-24 PROCEDURE — 97110 THERAPEUTIC EXERCISES: CPT | Mod: PN

## 2023-02-24 NOTE — PROGRESS NOTES
"[facility]   Physical Therapy Treatment Note     Name: Adama Ortiz  Clinic Number: [id number]    Therapy Diagnosis:   Encounter Diagnoses   Name Primary?    Bilateral foot pain Yes    Muscle weakness     Plantar fasciitis of left foot     Left Achilles tendinitis      Physician: Oliva Butler DPM    Visit Date: 2/24/2023      Therapy Diagnosis:        Encounter Diagnoses   Name Primary?    Pain in both feet      Muscle weakness      Plantar fasciitis of left foot      Left Achilles tendinitis        Physician: Oliva Butler DPM     Physician Orders: PT Eval and Treat   Medical Diagnosis from Referral: M79.671,M79.672 (ICD-10-CM) - Pain in both feet  Evaluation Date: 2/13/2023  Authorization Period Expiration: 1/24/2024  Plan of Care Expiration: 4/10/2023  Progress Note Due: 3/13/2023  Visit # / Visits authorized: 3/eval + 25      PTA Visit: 2/5    Precautions: Standard      Time In: 745 am   Time Out: 11:45 am   Total Appointment Time (timed & untimed codes): 60 minutes (3TE, 1MT)         SUBJECTIVE      Pt reports: Pt states that her feet are feeling better today.  Took a couple of days for her pain to calm down from being elevated earlier in the week.   She was compliant with home exercise program.  Response to previous treatment: she was a little sore  Functional change: N/A    Pain: 2/10  Location: bilateral feet      OBJECTIVE     Objective Measures updated at progress report unless specified.     Treatment     Adama received the treatments listed below:      therapeutic exercises to develop strength and ROM for 15 minutes including:  Gostroc/soleus stretch 3xs 30"hold  AROM DF/INV/EVR 20x RTB  Standing Heel raise 2x15    manual therapy techniques: Joint mobilizations were applied to the: Alvin feet for 15 minutes, including:  Alvin TCJ distraction gr3  Alvin TCJ AP gr3  STM alvin calf    neuromuscular re-education activities to improve: Balance, Coordination, and Proprioception " "for 15 minutes. The following activities were included:  Seated arch curls, RTB 20x  Tandem balance 3x30"  Seated HR w/ ball squeeze, 10#, 2x15    therapeutic activities to improve functional performance for 00  minutes, including:      gait training to improve functional mobility and safety for 00  minutes, including:    .        Patient Education and Home Exercises     Education provided:   - Pt educated in dx, prognosis, POC, and HEP to include activity modification to aide in symptom modulation.      Written Home Exercises Provided: yes. Exercises were reviewed and Adama was able to demonstrate them prior to the end of the session.  Adama demonstrated good  understanding of the education provided. See EMR under Patient Instructions for exercises provided during therapy sessions.      ASSESSMENT   Significant tightness noted with nicole calf, L>R.  This primarily present along the medial calf around post tib muscle belly.  Due to lack of arch control contributing to symptoms this muscle having to overwork to help maintain foot posture.  Difficulty with calf raises and arch curls on the L secondary to said muscle weakness.  Progressed exercises today to support further foot intrinsic strength to take pressure of plantar fascia insertion.  She did well overall today with muscle fatigue reported at the end.        Patient prognosis is Good.   Patient will benefit from skilled outpatient Physical Therapy to address the deficits stated above and in the chart below, provide patient /family education, and to maximize patientt's level of independence.      Plan of care discussed with patient: Yes  Patient's spiritual, cultural and educational needs considered and patient is agreeable to the plan of care and goals as stated below:      Anticipated Barriers for therapy: none     Goals:   Short Term Goals:  Pt will be ind w/ HEP w/in 2 wks  Pt will report a dec of at least 2pts on 0-10 scale (per MCID) in foot pain within " 3 wks for symptom modulation  3.   Pt will demonstrate L ankle DF AROM to at least 8deg for improved gait mechanics within 3 wks.      Long Term Goals:  Pt will demonstrate ability to perform 10 SL calf raises w/o heel pain for improved walking tolerance within 6 wks.   Pt will report being able to stand on her feet for at least 2 hrs w/o heel pain > 2/10 for improved tolerance with cooking within 6 wks.   Pt will report being able to walk for at least 1 hr w/o heel pain >2/10 for improved ability with grocery shopping within 6 wks.        PLAN     Continue to improve nicole ankle ROM and strength.    Bernard Roblero, PT , DPT, OCS

## 2023-03-07 ENCOUNTER — CLINICAL SUPPORT (OUTPATIENT)
Dept: REHABILITATION | Facility: HOSPITAL | Age: 48
End: 2023-03-07
Attending: PODIATRIST
Payer: COMMERCIAL

## 2023-03-07 DIAGNOSIS — M62.81 MUSCLE WEAKNESS: ICD-10-CM

## 2023-03-07 DIAGNOSIS — M79.672 BILATERAL FOOT PAIN: Primary | ICD-10-CM

## 2023-03-07 DIAGNOSIS — M79.671 BILATERAL FOOT PAIN: Primary | ICD-10-CM

## 2023-03-07 DIAGNOSIS — M72.2 PLANTAR FASCIITIS OF LEFT FOOT: ICD-10-CM

## 2023-03-07 DIAGNOSIS — M76.62 LEFT ACHILLES TENDINITIS: ICD-10-CM

## 2023-03-07 PROCEDURE — 97110 THERAPEUTIC EXERCISES: CPT | Mod: PN,CQ

## 2023-03-07 PROCEDURE — 97140 MANUAL THERAPY 1/> REGIONS: CPT | Mod: PN,CQ

## 2023-03-07 NOTE — PROGRESS NOTES
"[facility]   Physical Therapy Treatment Note     Name: Adama Ortiz  Clinic Number: [id number]    Therapy Diagnosis:   Encounter Diagnoses   Name Primary?    Bilateral foot pain Yes    Muscle weakness     Plantar fasciitis of left foot     Left Achilles tendinitis        Physician: Oliva Butler DPM    Visit Date: 3/7/2023      Therapy Diagnosis:        Encounter Diagnoses   Name Primary?    Pain in both feet      Muscle weakness      Plantar fasciitis of left foot      Left Achilles tendinitis        Physician: Oliva Butler DPM     Physician Orders: PT Eval and Treat   Medical Diagnosis from Referral: M79.671,M79.672 (ICD-10-CM) - Pain in both feet  Evaluation Date: 2/13/2023  Authorization Period Expiration: 1/24/2024  Plan of Care Expiration: 4/10/2023  Progress Note Due: 3/13/2023  Visit # / Visits authorized: 3/eval + 25      PTA Visit: 2/5    Precautions: Standard      Time In: 708AM  Time Out: 807AM  Total Appointment Time (timed & untimed codes): 59         SUBJECTIVE      Pt reports: She doesn't have as much pain as she had been having. She went on a trip recently just got back and she took it easy for the past few days. She was limping for 2 days after her trip.   She was compliant with home exercise program.  Response to previous treatment: she was a little sore  Functional change: N/A    Pain: 2/10  Location: bilateral feet      OBJECTIVE     Objective Measures updated at progress report unless specified.     Treatment     Adama received the treatments listed below:      therapeutic exercises to develop strength and ROM for 42 minutes including:  Nu step x 7'  Gostroc/soleus stretch 4xs 30"hold  AROM DF/INV/EVR 20x RTB  Standing Heel raise 2x15  Shuttle 3 blk 2 x 10 with arch engaged  Jesus x 20 ea     neuromuscular re-education activities to improve: Balance, Coordination, and Proprioception for 05 minutes. The following activities were included:    Tandem balance " "3x30"  Seated HR w/ ball squeeze, 10#, 2x15    manual therapy techniques: Joint mobilizations were applied to the: Alvin feet for 12 minutes, including:    STM alvin calf        therapeutic activities to improve functional performance for 00  minutes, including:      gait training to improve functional mobility and safety for 00  minutes, including:      Patient Education and Home Exercises     Education provided:   - Pt educated in dx, prognosis, POC, and HEP to include activity modification to aide in symptom modulation.      Written Home Exercises Provided: yes. Exercises were reviewed and Adama was able to demonstrate them prior to the end of the session.  Adama demonstrated good  understanding of the education provided. See EMR under Patient Instructions for exercises provided during therapy sessions.      ASSESSMENT   Adama tolerated session with mild pain despite a recent flare up when out of town on vacation. Performed STM to gastroc with soft tissue dysfunction noted in bilateral calf. Pt is TTP along the medial calf around post tib muscle belly. Pt might benefit from FDN in future visits. Added shuttle squats with arch engagement to promote arch engagement with WB for function carryover. Overall good tolerance to today's session without further exacerbation.          Patient prognosis is Good.   Patient will benefit from skilled outpatient Physical Therapy to address the deficits stated above and in the chart below, provide patient /family education, and to maximize patientt's level of independence.      Plan of care discussed with patient: Yes  Patient's spiritual, cultural and educational needs considered and patient is agreeable to the plan of care and goals as stated below:      Anticipated Barriers for therapy: none     Goals:   Short Term Goals:  Pt will be ind w/ HEP w/in 2 wks  Pt will report a dec of at least 2pts on 0-10 scale (per MCID) in foot pain within 3 wks for symptom modulation  3.   Pt " will demonstrate L ankle DF AROM to at least 8deg for improved gait mechanics within 3 wks.      Long Term Goals:  Pt will demonstrate ability to perform 10 SL calf raises w/o heel pain for improved walking tolerance within 6 wks.   Pt will report being able to stand on her feet for at least 2 hrs w/o heel pain > 2/10 for improved tolerance with cooking within 6 wks.   Pt will report being able to walk for at least 1 hr w/o heel pain >2/10 for improved ability with grocery shopping within 6 wks.        PLAN     Continue to improve nicole ankle ROM and strength.    Dax Cole, PTA ,   03/07/2023

## 2023-03-08 NOTE — PROGRESS NOTES
"[facility]   Physical Therapy Treatment Note     Name: Adama Ortiz  Clinic Number: [id number]    Therapy Diagnosis:   Encounter Diagnoses   Name Primary?    Bilateral foot pain Yes    Muscle weakness     Plantar fasciitis of left foot     Left Achilles tendinitis          Physician: Oliva Butler DPM    Visit Date: 3/9/2023      Therapy Diagnosis:        Encounter Diagnoses   Name Primary?    Pain in both feet      Muscle weakness      Plantar fasciitis of left foot      Left Achilles tendinitis        Physician: Oliva Butler DPM     Physician Orders: PT Eval and Treat   Medical Diagnosis from Referral: M79.671,M79.672 (ICD-10-CM) - Pain in both feet  Evaluation Date: 2/13/2023  Authorization Period Expiration: 1/24/2024  Plan of Care Expiration: 4/10/2023  Progress Note Due: 3/13/2023  Visit # / Visits authorized: 4/eval + 25      PTA Visit: 2/5    Precautions: Standard      Time In: ***  Time Out: ***  Total Appointment Time (timed & untimed codes): ***         SUBJECTIVE      Pt reports: She doesn't have as much pain as she had been having. She went on a trip recently just got back and she took it easy for the past few days. She was limping for 2 days after her trip.   She was compliant with home exercise program.  Response to previous treatment: she was a little sore  Functional change: N/A    Pain: 2/10  Location: bilateral feet      OBJECTIVE     Objective Measures updated at progress report unless specified.     Treatment     Adama received the treatments listed below:      therapeutic exercises to develop strength and ROM for *** minutes including:  Nu step x 7'  Gostroc/soleus stretch 4xs 30"hold  AROM DF/INV/EVR 20x RTB  Standing Heel raise 2x15  Shuttle 3 blk 2 x 10 with arch engaged  Jesus x 20 ea     neuromuscular re-education activities to improve: Balance, Coordination, and Proprioception for *** minutes. The following activities were included:    Tandem balance " "3x30"  Seated HR w/ ball squeeze, 10#, 2x15    manual therapy techniques: Joint mobilizations were applied to the: Alvin feet for *** minutes, including:    STM alvin calf        therapeutic activities to improve functional performance for 00  minutes, including:      gait training to improve functional mobility and safety for 00  minutes, including:      Patient Education and Home Exercises     Education provided:   - Pt educated in dx, prognosis, POC, and HEP to include activity modification to aide in symptom modulation.      Written Home Exercises Provided: yes. Exercises were reviewed and Adama was able to demonstrate them prior to the end of the session.  Adama demonstrated good  understanding of the education provided. See EMR under Patient Instructions for exercises provided during therapy sessions.      ASSESSMENT   Adama tolerated session with mild pain despite a recent flare up when out of town on vacation. Performed STM to gastroc with soft tissue dysfunction noted in bilateral calf. Pt is TTP along the medial calf around post tib muscle belly. Pt might benefit from FDN in future visits. Added shuttle squats with arch engagement to promote arch engagement with WB for function carryover. Overall good tolerance to today's session without further exacerbation.          Patient prognosis is Good.   Patient will benefit from skilled outpatient Physical Therapy to address the deficits stated above and in the chart below, provide patient /family education, and to maximize patientt's level of independence.      Plan of care discussed with patient: Yes  Patient's spiritual, cultural and educational needs considered and patient is agreeable to the plan of care and goals as stated below:      Anticipated Barriers for therapy: none     Goals:   Short Term Goals:  Pt will be ind w/ HEP w/in 2 wks  Pt will report a dec of at least 2pts on 0-10 scale (per MCID) in foot pain within 3 wks for symptom modulation  3.   " Pt will demonstrate L ankle DF AROM to at least 8deg for improved gait mechanics within 3 wks.      Long Term Goals:  Pt will demonstrate ability to perform 10 SL calf raises w/o heel pain for improved walking tolerance within 6 wks.   Pt will report being able to stand on her feet for at least 2 hrs w/o heel pain > 2/10 for improved tolerance with cooking within 6 wks.   Pt will report being able to walk for at least 1 hr w/o heel pain >2/10 for improved ability with grocery shopping within 6 wks.        PLAN     Continue to improve nicole ankle ROM and strength.    Dax Cole, PTA ,   03/08/2023

## 2023-03-09 ENCOUNTER — CLINICAL SUPPORT (OUTPATIENT)
Dept: REHABILITATION | Facility: HOSPITAL | Age: 48
End: 2023-03-09
Attending: PODIATRIST
Payer: COMMERCIAL

## 2023-03-09 DIAGNOSIS — M72.2 PLANTAR FASCIITIS OF LEFT FOOT: ICD-10-CM

## 2023-03-09 DIAGNOSIS — M79.672 BILATERAL FOOT PAIN: Primary | ICD-10-CM

## 2023-03-09 DIAGNOSIS — M76.62 LEFT ACHILLES TENDINITIS: ICD-10-CM

## 2023-03-09 DIAGNOSIS — M79.671 BILATERAL FOOT PAIN: Primary | ICD-10-CM

## 2023-03-09 DIAGNOSIS — M62.81 MUSCLE WEAKNESS: ICD-10-CM

## 2023-03-09 PROCEDURE — 97112 NEUROMUSCULAR REEDUCATION: CPT | Mod: PN

## 2023-03-09 PROCEDURE — 97110 THERAPEUTIC EXERCISES: CPT | Mod: PN

## 2023-03-09 NOTE — PROGRESS NOTES
"[facility]   Physical Therapy Treatment Note     Name: Adama Ortiz  Clinic Number: [id number]    Therapy Diagnosis:   Encounter Diagnoses   Name Primary?    Bilateral foot pain Yes    Muscle weakness     Plantar fasciitis of left foot     Left Achilles tendinitis        Physician: Oliva Butler DPM    Visit Date: 3/9/2023      Physician: Oliva Butler DPM     Physician Orders: PT Eval and Treat   Medical Diagnosis from Referral: M79.671,M79.672 (ICD-10-CM) - Pain in both feet  Evaluation Date: 2/13/2023  Authorization Period Expiration: 1/24/2024  Plan of Care Expiration: 4/10/2023  Progress Note Due: 4/9/2023  Visit # / Visits authorized: 6/eval + 25      PTA Visit: 2/5    Precautions: Standard      Time In: 7:07 am  Time Out: 8:00 am  Total Appointment Time (timed & untimed codes): 53         SUBJECTIVE      Pt reports: that she having increase in pain in the L foot today. Pt states she is feeling improvement since she started therapy. Pt states she has improved about 50% since she started therapy. Pt states frequency of B feet pain throughout week has decreased. Pt states pain at worse is about 5/10 now.   She was compliant with home exercise program.  Response to previous treatment: she was a little sore  Functional change: N/A    Pain: 2/10  Location: bilateral feet      OBJECTIVE     Objective Measures updated at progress report unless specified.       Active Range of Motion:   Ankle Right Left   DF (knee extended) 8 3   Plantarflexion 45 35   Inversion 30 30   Eversion 10 10      Palpation: tenderness and pain of B gastroc and soleus     Treatment     Adama received the treatments listed below:      therapeutic exercises to develop strength and ROM for 43 minutes including:    Nu step x 7'  Gostroc/soleus stretch 5xs 30"hold  AROM DF/INV/EVR 20x RTB  Standing Heel raise 2x15  Shuttle 3 blk 2 x 10 with arch engaged  Jesus x 20 ea     neuromuscular re-education activities " "to improve: Balance, Coordination, and Proprioception for 10 minutes. The following activities were included:    Seated Arch lift 10x hold 3 sec ea ( improve intrinsic muscles control)   NBOS on foam EC + lift arch  5x30 sec   Tandem balance on foam EO + lift arch  3x30" ea  Seated HR w/ ball squeeze, 10#, 2x15    manual therapy techniques: Joint mobilizations were applied to the: Alvin feet for 00  minutes, including:    STM alvin calf      therapeutic activities to improve functional performance for 00  minutes, including:      gait training to improve functional mobility and safety for 00  minutes, including:      Patient Education and Home Exercises     Education provided:   - Pt educated in dx, prognosis, POC, and HEP to include activity modification to aide in symptom modulation.      Written Home Exercises Provided: yes. Exercises were reviewed and Adama was able to demonstrate them prior to the end of the session.  Adama demonstrated good  understanding of the education provided. See EMR under Patient Instructions for exercises provided during therapy sessions.      ASSESSMENT     Pt has been in therapy for about 3 weeks. Pt ambulated with proper gait pattern today, but she still have B feet pain. Pt had improvement in L ankle DF AROM, but improvement has not been significant. Frequency of B Feet pain has been decreasing throughout week. Pain at worse is 5/10 now. Pt cont with decrease B feet intrinsic muscles strength. Pt has poor ankle proprioception/muscles strength. During palpation, she still have calf tenderness and pain. Pt has met 3/3 STGs. Overall, pt is improving int therapy. Cont feet intrinsic muscles strength, ankle proprioception exercises and STM of B calf.          Patient prognosis is Good.   Patient will benefit from skilled outpatient Physical Therapy to address the deficits stated above and in the chart below, provide patient /family education, and to maximize patientt's level of " independence.      Plan of care discussed with patient: Yes  Patient's spiritual, cultural and educational needs considered and patient is agreeable to the plan of care and goals as stated below:      Anticipated Barriers for therapy: none     Goals:   Short Term Goals:  Pt will be ind w/ HEP w/in 2 wks. Goal met 3-9-23  Pt will report a dec of at least 2pts on 0-10 scale (per MCID) in foot pain within 3 wks for symptom modulation.  Goal met 3-9-23  3.   Pt will demonstrate L ankle DF AROM to at least 8deg for improved gait mechanics within 3 wks.  Goal met 3-9-23     Long Term Goals:  Pt will demonstrate ability to perform 10 SL calf raises w/o heel pain for improved walking tolerance within 6 wks.   Pt will report being able to stand on her feet for at least 2 hrs w/o heel pain > 2/10 for improved tolerance with cooking within 6 wks.   Pt will report being able to walk for at least 1 hr w/o heel pain >2/10 for improved ability with grocery shopping within 6 wks.        PLAN     Continue to improve nicole ankle ROM and strength.    Florentino Winkler, PT ,   03/09/2023

## 2023-03-15 NOTE — PROGRESS NOTES
"[facility]   Physical Therapy Treatment Note     Name: Adama Ortiz  Clinic Number: [id number]    Therapy Diagnosis:   Encounter Diagnoses   Name Primary?    Bilateral foot pain Yes    Muscle weakness     Plantar fasciitis of left foot     Left Achilles tendinitis            Physician: Oliva Butler DPM    Visit Date: 3/16/2023      Physician: Oliva Butelr DPM     Physician Orders: PT Eval and Treat   Medical Diagnosis from Referral: M79.671,M79.672 (ICD-10-CM) - Pain in both feet  Evaluation Date: 2/13/2023  Authorization Period Expiration: 1/24/2024  Plan of Care Expiration: 4/10/2023  Progress Note Due: 4/9/2023  Visit # / Visits authorized: 6/eval + 25      PTA Visit: 2/5    Precautions: Standard      Time In: 708AM  Time Out: 756AM  Total Appointment Time (timed & untimed codes): 48         SUBJECTIVE      Pt reports: She has been doing okay. She had to miss Tuesday because of a cold. She has been doing her exercises but this morning she did have a little pull in her R medial foot.   She was compliant with home exercise program.  Response to previous treatment: she was a little sore  Functional change: N/A    Pain: 1/10  Location: bilateral feet      OBJECTIVE     Objective Measures updated at progress report unless specified.       Active Range of Motion:   Ankle Right Left   DF (knee extended) 8 3   Plantarflexion 45 35   Inversion 30 30   Eversion 10 10      Palpation: tenderness and pain of B gastroc and soleus     Treatment     Adama received the treatments listed below:      therapeutic exercises to develop strength and ROM for 24 minutes including:    Nu step x 7'  Gostroc/soleus stretch 5xs 30"hold  AROM DF/INV/EVR 20x RTB  Standing Heel raise 2x15  Shuttle 3 blk 2 x 10 with arch engaged  Jesus x 20 ea         neuromuscular re-education activities to improve: Balance, Coordination, and Proprioception for 24 minutes. The following activities were included:    Seated " "Arch lift 10x hold 3 sec ea ( improve intrinsic muscles control)   NBOS on foam EC + lift arch  5x30 sec   Tandem balance on foam EO + lift arch  3x30" ea  Seated HR w/ ball squeeze, 10#, 2x15  +SL balance on slant foam 2 x 20" for posterior tib activation (L worse than R)  +Stationary lunge with YTB at first met x 10 ea       manual therapy techniques: Joint mobilizations were applied to the: Alvin feet for 00  minutes, including:    STM alvin calf      therapeutic activities to improve functional performance for 00  minutes, including:      gait training to improve functional mobility and safety for 00  minutes, including:      Patient Education and Home Exercises     Education provided:   - Pt educated in dx, prognosis, POC, and HEP to include activity modification to aide in symptom modulation.      Written Home Exercises Provided: yes. Exercises were reviewed and Adama was able to demonstrate them prior to the end of the session.  Adama demonstrated good  understanding of the education provided. See EMR under Patient Instructions for exercises provided during therapy sessions.      ASSESSMENT   Adama presents to session with mild pain. Pt reported some discomfort along medial R foot which pt reports is sharp. Introduced exercises to activate posterior tib with pt appropriately challenged with L weaker than R. Will continue to progress as tolerated.            Patient prognosis is Good.   Patient will benefit from skilled outpatient Physical Therapy to address the deficits stated above and in the chart below, provide patient /family education, and to maximize patientt's level of independence.      Plan of care discussed with patient: Yes  Patient's spiritual, cultural and educational needs considered and patient is agreeable to the plan of care and goals as stated below:      Anticipated Barriers for therapy: none     Goals:   Short Term Goals:  Pt will be ind w/ HEP w/in 2 wks. Goal met 3-9-23  Pt will report " a dec of at least 2pts on 0-10 scale (per MCID) in foot pain within 3 wks for symptom modulation.  Goal met 3-9-23  3.   Pt will demonstrate L ankle DF AROM to at least 8deg for improved gait mechanics within 3 wks.  Goal met 3-9-23     Long Term Goals:  Pt will demonstrate ability to perform 10 SL calf raises w/o heel pain for improved walking tolerance within 6 wks.   Pt will report being able to stand on her feet for at least 2 hrs w/o heel pain > 2/10 for improved tolerance with cooking within 6 wks.   Pt will report being able to walk for at least 1 hr w/o heel pain >2/10 for improved ability with grocery shopping within 6 wks.        PLAN     Continue to improve nicole ankle ROM and strength.    Dax Cole, PTA ,   03/16/2023

## 2023-03-16 ENCOUNTER — CLINICAL SUPPORT (OUTPATIENT)
Dept: REHABILITATION | Facility: HOSPITAL | Age: 48
End: 2023-03-16
Attending: PODIATRIST
Payer: COMMERCIAL

## 2023-03-16 DIAGNOSIS — M62.81 MUSCLE WEAKNESS: ICD-10-CM

## 2023-03-16 DIAGNOSIS — M72.2 PLANTAR FASCIITIS OF LEFT FOOT: ICD-10-CM

## 2023-03-16 DIAGNOSIS — M79.671 BILATERAL FOOT PAIN: Primary | ICD-10-CM

## 2023-03-16 DIAGNOSIS — M76.62 LEFT ACHILLES TENDINITIS: ICD-10-CM

## 2023-03-16 DIAGNOSIS — M79.672 BILATERAL FOOT PAIN: Primary | ICD-10-CM

## 2023-03-16 PROCEDURE — 97110 THERAPEUTIC EXERCISES: CPT | Mod: PN,CQ

## 2023-03-16 PROCEDURE — 97112 NEUROMUSCULAR REEDUCATION: CPT | Mod: PN,CQ

## 2023-03-20 ENCOUNTER — CLINICAL SUPPORT (OUTPATIENT)
Dept: REHABILITATION | Facility: HOSPITAL | Age: 48
End: 2023-03-20
Attending: PODIATRIST
Payer: COMMERCIAL

## 2023-03-20 DIAGNOSIS — M79.671 BILATERAL FOOT PAIN: Primary | ICD-10-CM

## 2023-03-20 DIAGNOSIS — M62.81 MUSCLE WEAKNESS: ICD-10-CM

## 2023-03-20 DIAGNOSIS — M79.672 BILATERAL FOOT PAIN: Primary | ICD-10-CM

## 2023-03-20 DIAGNOSIS — M76.62 LEFT ACHILLES TENDINITIS: ICD-10-CM

## 2023-03-20 DIAGNOSIS — M72.2 PLANTAR FASCIITIS OF LEFT FOOT: ICD-10-CM

## 2023-03-20 PROCEDURE — 97112 NEUROMUSCULAR REEDUCATION: CPT | Mod: PN

## 2023-03-20 PROCEDURE — 97140 MANUAL THERAPY 1/> REGIONS: CPT | Mod: PN

## 2023-03-20 PROCEDURE — 97110 THERAPEUTIC EXERCISES: CPT | Mod: PN

## 2023-03-20 NOTE — PROGRESS NOTES
"[facility]   Physical Therapy Treatment Note     Name: Adama Ortiz  Clinic Number: [id number]    Therapy Diagnosis:   Encounter Diagnoses   Name Primary?    Bilateral foot pain Yes    Muscle weakness     Plantar fasciitis of left foot     Left Achilles tendinitis            Physician: Oliva Butler DPM    Visit Date: 3/20/2023      Physician: Oliva Butler DPM     Physician Orders: PT Eval and Treat   Medical Diagnosis from Referral: M79.671,M79.672 (ICD-10-CM) - Pain in both feet  Evaluation Date: 2/13/2023  Authorization Period Expiration: 1/24/2024  Plan of Care Expiration: 4/10/2023  Progress Note Due: 4/9/2023  Visit # / Visits authorized: 7/eval + 25      PTA Visit: 2/5    Precautions: Standard      Time In: 820AM  Time Out: 900AM  Total Appointment Time (timed & untimed codes): 40         SUBJECTIVE      Pt reports: Pt states that her feet are doing better overall but can still have bad days.  States she primarily fluctuates between a 1-4/10 pain range.  Has been trying to get to the gym more to which she will ride the nustep for about 45min. Can tell when she is not engaging her arch as well. States that when she is trying to engage her arch more while on the bike this can make her feet a little more sore.   She was compliant with home exercise program.  Response to previous treatment: she was a little sore  Functional change: N/A    Pain: 1/10  Location: bilateral feet      OBJECTIVE     Objective Measures updated at progress report unless specified.       Active Range of Motion:   Ankle Right Left   DF (knee extended) 8 3   Plantarflexion 45 35   Inversion 30 30   Eversion 10 10      Palpation: tenderness and pain of B gastroc and soleus     Treatment     Adama received the treatments listed below:      therapeutic exercises to develop strength and ROM for 12 minutes including:    Gostroc/soleus stretch 5xs 30"hold  AROM DF/INV30x GTB  Standing Heel raise 2x8 2 up 1 " "down  Heel/toe walkin lap ea        neuromuscular re-education activities to improve: Balance, Coordination, and Proprioception for 20 minutes. The following activities were included:    Seated Arch lift 10x hold 3 sec ea ( improve intrinsic muscles control)   Tandem walking on foam 4 laps  +SL balance on slant foam 2 x 20" for posterior tib activation (L worse than R)  +Stationary lunge with YTB at first met x 10 ea       manual therapy techniques: Joint mobilizations were applied to the: Alvin feet for 8  minutes, including:  Alvin TCJ AP gr4  Lavin TCJ distraction gr4  Alvin STJ lat glide gr4      therapeutic activities to improve functional performance for 00  minutes, including:      gait training to improve functional mobility and safety for 00  minutes, including:      Patient Education and Home Exercises     Education provided:   - Pt educated in dx, prognosis, POC, and HEP to include activity modification to aide in symptom modulation.      Written Home Exercises Provided: yes. Exercises were reviewed and Adama was able to demonstrate them prior to the end of the session.  Adama demonstrated good  understanding of the education provided. See EMR under Patient Instructions for exercises provided during therapy sessions.      ASSESSMENT   She has been good progress through therapy at this point with a great understanding on soreness rules in how much load is allowed.  Progressed to eccentric lowering calf raises to which this started to get close to 5/10 pain as reported by pt. This was still within the 'yellow' zone based on soreness rules so this I was ok with to help apply further load toward tissues. Would benefit from continued strengthening with focus on calf and foot intrinsic control.            Patient prognosis is Good.   Patient will benefit from skilled outpatient Physical Therapy to address the deficits stated above and in the chart below, provide patient /family education, and to maximize " patientt's level of independence.      Plan of care discussed with patient: Yes  Patient's spiritual, cultural and educational needs considered and patient is agreeable to the plan of care and goals as stated below:      Anticipated Barriers for therapy: none     Goals:   Short Term Goals:  Pt will be ind w/ HEP w/in 2 wks. Goal met 3-9-23  Pt will report a dec of at least 2pts on 0-10 scale (per MCID) in foot pain within 3 wks for symptom modulation.  Goal met 3-9-23  3.   Pt will demonstrate L ankle DF AROM to at least 8deg for improved gait mechanics within 3 wks.  Goal met 3-9-23     Long Term Goals:  Pt will demonstrate ability to perform 10 SL calf raises w/o heel pain for improved walking tolerance within 6 wks.   Pt will report being able to stand on her feet for at least 2 hrs w/o heel pain > 2/10 for improved tolerance with cooking within 6 wks.   Pt will report being able to walk for at least 1 hr w/o heel pain >2/10 for improved ability with grocery shopping within 6 wks.        PLAN     Continue to improve nicole ankle ROM and strength.    Bernard Roblero, PT ,   03/20/2023

## 2023-03-22 NOTE — PROGRESS NOTES
"[facility]   Physical Therapy Treatment Note     Name: Adama Ortiz  Clinic Number: [id number]    Therapy Diagnosis:   Encounter Diagnoses   Name Primary?    Bilateral foot pain Yes    Muscle weakness     Plantar fasciitis of left foot     Left Achilles tendinitis          Physician: Oliva Butler DPM    Visit Date: 3/23/2023      Physician: Oliva Butler DPM     Physician Orders: PT Eval and Treat   Medical Diagnosis from Referral: M79.671,M79.672 (ICD-10-CM) - Pain in both feet  Evaluation Date: 2023  Authorization Period Expiration: 2024  Plan of Care Expiration: 4/10/2023  Progress Note Due: 2023  Visit # / Visits authorized: eval + 25      PTA Visit:     Precautions: Standard      Time In: 705AM  Time Out: 800AM  Total Appointment Time (timed & untimed codes): 55  mins         SUBJECTIVE      Pt reports: She was a little sore after new stuff last week but then it got better. She only has pain with certain movements.   She was compliant with home exercise program.  Response to previous treatment: she was a little sore  Functional change: N/A    Pain: 1/10  Location: bilateral feet      OBJECTIVE     Objective Measures updated at progress report unless specified.       Active Range of Motion:   Ankle Right Left   DF (knee extended) 8 3   Plantarflexion 45 35   Inversion 30 30   Eversion 10 10      Palpation: tenderness and pain of B gastroc and soleus     Treatment     Adama received the treatments listed below:      therapeutic exercises to develop strength and ROM for 33 minutes including:  Upright bike x 7'  Gostroc/soleus stretch 5xs 30"hold  AROM DF/INV30x GTB  Standing Heel raise 2x8 2 up 1 down  Heel/toe walkin lap ea  +Bent knee soleus raise 2 x 8    neuromuscular re-education activities to improve: Balance, Coordination, and Proprioception for 12 minutes. The following activities were included:    Seated Arch lift 10x hold 3 sec ea ( improve " "intrinsic muscles control)   Tandem walking on foam 4 laps  SL balance on slant foam 2 x 20" for posterior tib activation (Lx 1 on left) DC due to lateral pain        manual therapy techniques: Joint mobilizations were applied to the: L foot for 10  minutes, including:  STM to peroneals   Alvin TCJ distraction gr3        therapeutic activities to improve functional performance for 00  minutes, including:      gait training to improve functional mobility and safety for 00  minutes, including:      Patient Education and Home Exercises     Education provided:   - Pt educated in dx, prognosis, POC, and HEP to include activity modification to aide in symptom modulation.      Written Home Exercises Provided: yes. Exercises were reviewed and Adama was able to demonstrate them prior to the end of the session.  Adama demonstrated good  understanding of the education provided. See EMR under Patient Instructions for exercises provided during therapy sessions.      ASSESSMENT   Adama arrives to session with mild pain. Pt reported pain around lateral malleolus of L foot that was aggravated by balance on slant board. Exercise was discontinued. Pt reports pain in left foot is around lateral foot near base of  fifth metatarsal and around lateral malleolus. During soft tissue mobilization pt was very TTP along lateral calf. Pt might be dealing with secondary issue of peroneal tendonitis in L ankle. Will continue to monitor.         Patient prognosis is Good.   Patient will benefit from skilled outpatient Physical Therapy to address the deficits stated above and in the chart below, provide patient /family education, and to maximize patientt's level of independence.      Plan of care discussed with patient: Yes  Patient's spiritual, cultural and educational needs considered and patient is agreeable to the plan of care and goals as stated below:      Anticipated Barriers for therapy: none     Goals:   Short Term Goals:  Pt will be " ind w/ HEP w/in 2 wks. Goal met 3-9-23  Pt will report a dec of at least 2pts on 0-10 scale (per MCID) in foot pain within 3 wks for symptom modulation.  Goal met 3-9-23  3.   Pt will demonstrate L ankle DF AROM to at least 8deg for improved gait mechanics within 3 wks.  Goal met 3-9-23     Long Term Goals:  Pt will demonstrate ability to perform 10 SL calf raises w/o heel pain for improved walking tolerance within 6 wks.   Pt will report being able to stand on her feet for at least 2 hrs w/o heel pain > 2/10 for improved tolerance with cooking within 6 wks.   Pt will report being able to walk for at least 1 hr w/o heel pain >2/10 for improved ability with grocery shopping within 6 wks.        PLAN     Continue to improve nicole ankle ROM and strength.    Dax Cole, PTA ,   03/23/2023

## 2023-03-23 ENCOUNTER — CLINICAL SUPPORT (OUTPATIENT)
Dept: REHABILITATION | Facility: HOSPITAL | Age: 48
End: 2023-03-23
Attending: PODIATRIST
Payer: COMMERCIAL

## 2023-03-23 DIAGNOSIS — M76.62 LEFT ACHILLES TENDINITIS: ICD-10-CM

## 2023-03-23 DIAGNOSIS — M72.2 PLANTAR FASCIITIS OF LEFT FOOT: ICD-10-CM

## 2023-03-23 DIAGNOSIS — M79.672 BILATERAL FOOT PAIN: Primary | ICD-10-CM

## 2023-03-23 DIAGNOSIS — M79.671 BILATERAL FOOT PAIN: Primary | ICD-10-CM

## 2023-03-23 DIAGNOSIS — M62.81 MUSCLE WEAKNESS: ICD-10-CM

## 2023-03-23 PROCEDURE — 97110 THERAPEUTIC EXERCISES: CPT | Mod: PN,CQ

## 2023-03-23 PROCEDURE — 97140 MANUAL THERAPY 1/> REGIONS: CPT | Mod: PN,CQ

## 2023-03-23 PROCEDURE — 97112 NEUROMUSCULAR REEDUCATION: CPT | Mod: PN,CQ

## 2023-03-28 ENCOUNTER — CLINICAL SUPPORT (OUTPATIENT)
Dept: REHABILITATION | Facility: HOSPITAL | Age: 48
End: 2023-03-28
Attending: PODIATRIST
Payer: COMMERCIAL

## 2023-03-28 ENCOUNTER — DOCUMENTATION ONLY (OUTPATIENT)
Dept: REHABILITATION | Facility: HOSPITAL | Age: 48
End: 2023-03-28

## 2023-03-28 DIAGNOSIS — M79.671 BILATERAL FOOT PAIN: Primary | ICD-10-CM

## 2023-03-28 DIAGNOSIS — M76.62 LEFT ACHILLES TENDINITIS: ICD-10-CM

## 2023-03-28 DIAGNOSIS — M79.672 BILATERAL FOOT PAIN: Primary | ICD-10-CM

## 2023-03-28 DIAGNOSIS — M62.81 MUSCLE WEAKNESS: ICD-10-CM

## 2023-03-28 DIAGNOSIS — M72.2 PLANTAR FASCIITIS OF LEFT FOOT: ICD-10-CM

## 2023-03-28 PROCEDURE — 97110 THERAPEUTIC EXERCISES: CPT | Mod: PN,CQ

## 2023-03-28 PROCEDURE — 97140 MANUAL THERAPY 1/> REGIONS: CPT | Mod: PN,CQ

## 2023-03-28 PROCEDURE — 97112 NEUROMUSCULAR REEDUCATION: CPT | Mod: PN,CQ

## 2023-03-28 NOTE — PROGRESS NOTES
"  [facility]   Physical Therapy Treatment Note     Name: Adama Ortiz  Clinic Number: [id number]    Therapy Diagnosis:   Encounter Diagnoses   Name Primary?    Bilateral foot pain Yes    Muscle weakness     Plantar fasciitis of left foot     Left Achilles tendinitis            Physician: Oliva Butler DPM    Visit Date: 3/28/2023      Physician: Oliva Butler DPM     Physician Orders: PT Eval and Treat   Medical Diagnosis from Referral: M79.671,M79.672 (ICD-10-CM) - Pain in both feet  Evaluation Date: 2023  Authorization Period Expiration: 2024  Plan of Care Expiration: 4/10/2023  Progress Note Due: 2023  Visit # / Visits authorized: eval + 25      PTA Visit:     Precautions: Standard      Time In: 705AM  Time Out: 800AM  Total Appointment Time (timed & untimed codes): 55  mins         SUBJECTIVE      Pt reports: Her left foot is hurting a lot today on the bottom of her foot. She will say its a 2-3 but she has a high tolerance for pain. She did do a lot this weekend.   She was compliant with home exercise program.  Response to previous treatment: Sore  Functional change: N/A    Pain: 2-3/10  Location: bilateral feet      OBJECTIVE     Objective Measures updated at progress report unless specified.       Active Range of Motion:   Ankle Right Left   DF (knee extended) 8 3   Plantarflexion 45 35   Inversion 30 30   Eversion 10 10      Palpation: tenderness and pain of B gastroc and soleus     Treatment     Adama received the treatments listed below:      therapeutic exercises to develop strength and ROM for 20 minutes including:    Gostroc/soleus stretch 20 x 5"  AROM DF/INV30x GTB  Standing Heel raise 2x8 2 up 1 down  Heel/toe walkin lap ea  Bent knee soleus raise 2 x 8  [place] x 20    neuromuscular re-education activities to improve: Balance, Coordination, and Proprioception for 20 minutes. The following activities were included:    Seated Arch lift 10x hold " "3 sec ea ( improve intrinsic muscles control)   Tandem walking on foam 4 laps  NBOS on foam 3 x 30"  Tandem on foam 2 x 30"  SL balance 2 x 15"       manual therapy techniques: Joint mobilizations were applied to the: L foot for 15  minutes, including:  STM to peroneals, gastroc  Alvin TCJ distraction gr3  TCJ AP joint mobs        therapeutic activities to improve functional performance for 00  minutes, including:      gait training to improve functional mobility and safety for 00  minutes, including:      Patient Education and Home Exercises     Education provided:   - Pt educated in dx, prognosis, POC, and HEP to include activity modification to aide in symptom modulation.      Written Home Exercises Provided: yes. Exercises were reviewed and Adama was able to demonstrate them prior to the end of the session.  Adama demonstrated good  understanding of the education provided. See EMR under Patient Instructions for exercises provided during therapy sessions.      ASSESSMENT   Adama arrives to session with mild pain. Pt reported increased pain in left foot along medial and lateral foot. Pt is very sensitive to light pressure manual therapy interventions along lateral calf and gastroc. Pt was only able to complete 2 sets of ankle 4- way due to fatigue and weakness. Pt continues to have difficulty with ankle stability and single leg balance. Will discuss pt as a FDN candidate with primary PT.         Patient prognosis is Good.   Patient will benefit from skilled outpatient Physical Therapy to address the deficits stated above and in the chart below, provide patient /family education, and to maximize patientt's level of independence.      Plan of care discussed with patient: Yes  Patient's spiritual, cultural and educational needs considered and patient is agreeable to the plan of care and goals as stated below:      Anticipated Barriers for therapy: none     Goals:   Short Term Goals:  Pt will be ind w/ HEP w/in 2 " wks. Goal met 3-9-23  Pt will report a dec of at least 2pts on 0-10 scale (per MCID) in foot pain within 3 wks for symptom modulation.  Goal met 3-9-23  3.   Pt will demonstrate L ankle DF AROM to at least 8deg for improved gait mechanics within 3 wks.  Goal met 3-9-23     Long Term Goals:  Pt will demonstrate ability to perform 10 SL calf raises w/o heel pain for improved walking tolerance within 6 wks.   Pt will report being able to stand on her feet for at least 2 hrs w/o heel pain > 2/10 for improved tolerance with cooking within 6 wks.   Pt will report being able to walk for at least 1 hr w/o heel pain >2/10 for improved ability with grocery shopping within 6 wks.        PLAN     Continue to improve nicole ankle ROM and strength.    Dax Cole, MERCEDES ,   03/28/2023

## 2023-03-28 NOTE — PROGRESS NOTES
PT/PTA face to face conference performed during today's treatment session. Patient's goals and POC updated today.

## 2023-03-31 ENCOUNTER — CLINICAL SUPPORT (OUTPATIENT)
Dept: REHABILITATION | Facility: HOSPITAL | Age: 48
End: 2023-03-31
Attending: PODIATRIST
Payer: COMMERCIAL

## 2023-03-31 DIAGNOSIS — M76.62 LEFT ACHILLES TENDINITIS: ICD-10-CM

## 2023-03-31 DIAGNOSIS — M79.672 BILATERAL FOOT PAIN: Primary | ICD-10-CM

## 2023-03-31 DIAGNOSIS — M62.81 MUSCLE WEAKNESS: ICD-10-CM

## 2023-03-31 DIAGNOSIS — M72.2 PLANTAR FASCIITIS OF LEFT FOOT: ICD-10-CM

## 2023-03-31 DIAGNOSIS — M79.671 BILATERAL FOOT PAIN: Primary | ICD-10-CM

## 2023-03-31 PROCEDURE — 97110 THERAPEUTIC EXERCISES: CPT | Mod: PN

## 2023-03-31 PROCEDURE — 97112 NEUROMUSCULAR REEDUCATION: CPT | Mod: PN

## 2023-03-31 PROCEDURE — 97140 MANUAL THERAPY 1/> REGIONS: CPT | Mod: PN

## 2023-03-31 NOTE — PROGRESS NOTES
"[facility]   Physical Therapy Treatment Note     Name: Adama Ortiz  Clinic Number: [id number]    Therapy Diagnosis:   Encounter Diagnoses   Name Primary?    Bilateral foot pain Yes    Muscle weakness     Plantar fasciitis of left foot     Left Achilles tendinitis            Physician: Oliva Butler DPM    Visit Date: 3/31/2023      Physician: Oliva Butler DPM     Physician Orders: PT Eval and Treat   Medical Diagnosis from Referral: M79.671,M79.672 (ICD-10-CM) - Pain in both feet  Evaluation Date: 2/13/2023  Authorization Period Expiration: 1/24/2024  Plan of Care Expiration: 4/10/2023  Progress Note Due: 4/9/2023  Visit # / Visits authorized: 10/eval + 25      PTA Visit: 2/5    Precautions: Standard      Time In: 850AM  Time Out:930AM  Total Appointment Time (timed & untimed codes): 40  mins         SUBJECTIVE      Pt reports: States that in general her feet has been up/down over the past week. Has noticed that her feet do not tend to do well in the grass/unstable surfaces.  Was sore for a couple of days following last visit from the deep tissue massage. My previous therapist mentioned possi  She was compliant with home exercise program.  Response to previous treatment: Sore  Functional change: N/A    Pain: 2-3/10  Location: bilateral feet      OBJECTIVE     Objective Measures updated at progress report unless specified.       Active Range of Motion:   Ankle Right Left   DF (knee extended) 8 3   Plantarflexion 45 35   Inversion 30 30   Eversion 10 10      Palpation: tenderness and pain of B gastroc and soleus     Treatment     Adama received the treatments listed below:      therapeutic exercises to develop strength and ROM for 15 minutes including:    Gostroc/soleus stretch 20 x 5"  Standing Heel raise 2x8 2 up 1 down  Hip abd/flex, band across feet, YTB 2x10 nicole  Bent knee soleus raise 2 x 8    neuromuscular re-education activities to improve: Balance, Coordination, and " "Proprioception for 15 minutes. The following activities were included:    Arch lift w/ step over, 20x  Tandem walking on foam 4 laps  SL balance 2 x 15"       manual therapy techniques: Joint mobilizations were applied to the: L foot for 10  minutes, including:  L STM to peroneals, gastroc          therapeutic activities to improve functional performance for 00  minutes, including:      gait training to improve functional mobility and safety for 00  minutes, including:      Patient Education and Home Exercises     Education provided:   - Pt educated in dx, prognosis, POC, and HEP to include activity modification to aide in symptom modulation.      Written Home Exercises Provided: yes. Exercises were reviewed and Adama was able to demonstrate them prior to the end of the session.  Adama demonstrated good  understanding of the education provided. See EMR under Patient Instructions for exercises provided during therapy sessions.      ASSESSMENT   Discussed DN intervention with the pt. Therapist chose not to perform today secondary to not being able to see her again following this visit with therapist leaving clinic soon and not being able to follow up properly. Pt was ok/in agreement with this decision. Continued with STM towards the calf with palpable reduction noted within the soft tissue tone following. Pt having better tolerance toward activity as calf strength steadily improves. She would benefit from continued progression into calf and controlling pronation with further functional activities to help with improving current level of function.         Patient prognosis is Good.   Patient will benefit from skilled outpatient Physical Therapy to address the deficits stated above and in the chart below, provide patient /family education, and to maximize patientt's level of independence.      Plan of care discussed with patient: Yes  Patient's spiritual, cultural and educational needs considered and patient is " agreeable to the plan of care and goals as stated below:      Anticipated Barriers for therapy: none     Goals:   Short Term Goals:  Pt will be ind w/ HEP w/in 2 wks. Goal met 3-9-23  Pt will report a dec of at least 2pts on 0-10 scale (per MCID) in foot pain within 3 wks for symptom modulation.  Goal met 3-9-23  3.   Pt will demonstrate L ankle DF AROM to at least 8deg for improved gait mechanics within 3 wks.  Goal met 3-9-23     Long Term Goals:  Pt will demonstrate ability to perform 10 SL calf raises w/o heel pain for improved walking tolerance within 6 wks.   Pt will report being able to stand on her feet for at least 2 hrs w/o heel pain > 2/10 for improved tolerance with cooking within 6 wks.   Pt will report being able to walk for at least 1 hr w/o heel pain >2/10 for improved ability with grocery shopping within 6 wks.        PLAN     Continue to improve nicole ankle ROM and strength.    Bernard Roblero, PT ,   03/31/2023

## 2023-04-12 NOTE — PROGRESS NOTES
"[facility]   Physical Therapy Treatment Note     Name: Adama Ortiz  Clinic Number: [id number]    Therapy Diagnosis:   Encounter Diagnoses   Name Primary?    Bilateral foot pain Yes    Muscle weakness     Plantar fasciitis of left foot     Left Achilles tendinitis              Physician: Oliva Butler DPM    Visit Date: 4/13/2023      Physician: Oliva Butler DPM     Physician Orders: PT Eval and Treat   Medical Diagnosis from Referral: M79.671,M79.672 (ICD-10-CM) - Pain in both feet  Evaluation Date: 2/13/2023  Authorization Period Expiration: 1/24/2024  Plan of Care Expiration: 4/10/2023  Progress Note Due: 4/9/2023  Visit # / Visits authorized: 11/eval + 25      PTA Visit: 1/5    Precautions: Standard      Time In:705AM  Time Out: 802AM  Total Appointment Time (timed & untimed codes): 57 minutes         SUBJECTIVE      Pt reports: Her pain is doing good today. It wasn't good last week because she volunteered and had to going up and down a ladder. Her and her coworkers have decided to do a yoga stretch at lunch and she thinks that is really helping. She does feel like she is getting better overall but when she does things out of the ordinary it bothers her.     She was compliant with home exercise program.  Response to previous treatment: Sore  Functional change: N/A    Pain: 0/10  Location: bilateral feet      OBJECTIVE     Objective Measures updated at progress report unless specified.       Active Range of Motion:   Ankle Right Left   DF (knee extended) 8 3   Plantarflexion 45 35   Inversion 30 30   Eversion 10 10      Palpation: tenderness and pain of B gastroc and soleus     Treatment     Adama received the treatments listed below:      therapeutic exercises to develop strength and ROM for 30 minutes including:    Recumbent bike  8'  Ankle 4- way GTB 2 x 15ea (bilat)  Gostroc/soleus stretch 5 x 20"  Standing Heel raise 2x10 2 up 1 down  Hip abd band across feet, YTB 2x10 " "nicole  Bent knee soleus raise 2 x 10    neuromuscular re-education activities to improve: Balance, Coordination, and Proprioception for 27 minutes. The following activities were included:    Tandem walking on foam 4 laps  SL balance 2 x 30"   NBOS on foam c/ ball toss x 20  Tandem balance on foam with 2 x 20"  NBOS on foam with 7.5 KB pass x 10   [place] carries 15 Kbs with march 2 laps     manual therapy techniques: Joint mobilizations were applied to the: L foot for 00  minutes, including:  L STM to peroneals, gastroc      therapeutic activities to improve functional performance for 00  minutes, including:      gait training to improve functional mobility and safety for 00  minutes, including:      Patient Education and Home Exercises     Education provided:   - Pt educated in dx, prognosis, POC, and HEP to include activity modification to aide in symptom modulation.      Written Home Exercises Provided: yes. Exercises were reviewed and Adama was able to demonstrate them prior to the end of the session.  Adama demonstrated good  understanding of the education provided. See EMR under Patient Instructions for exercises provided during therapy sessions.      ASSESSMENT   Adama presents to session without c/o pain. She did have a recent flare up after going up and down a ladder. Continued progressing strength and stability and load tolerance. Pt continues to demo weakness and stability issues. Will continue to progress as tolerated.       Patient prognosis is Good.   Patient will benefit from skilled outpatient Physical Therapy to address the deficits stated above and in the chart below, provide patient /family education, and to maximize patientt's level of independence.      Plan of care discussed with patient: Yes  Patient's spiritual, cultural and educational needs considered and patient is agreeable to the plan of care and goals as stated below:      Anticipated Barriers for therapy: none     Goals:   Short " Term Goals:  Pt will be ind w/ HEP w/in 2 wks. Goal met 3-9-23  Pt will report a dec of at least 2pts on 0-10 scale (per MCID) in foot pain within 3 wks for symptom modulation.  Goal met 3-9-23  3.   Pt will demonstrate L ankle DF AROM to at least 8deg for improved gait mechanics within 3 wks.  Goal met 3-9-23     Long Term Goals:  Pt will demonstrate ability to perform 10 SL calf raises w/o heel pain for improved walking tolerance within 6 wks.   Pt will report being able to stand on her feet for at least 2 hrs w/o heel pain > 2/10 for improved tolerance with cooking within 6 wks.   Pt will report being able to walk for at least 1 hr w/o heel pain >2/10 for improved ability with grocery shopping within 6 wks.        PLAN     Continue to improve nicole ankle ROM and strength.    Dax Cole, PTA ,   04/13/2023

## 2023-04-13 ENCOUNTER — CLINICAL SUPPORT (OUTPATIENT)
Dept: REHABILITATION | Facility: HOSPITAL | Age: 48
End: 2023-04-13
Attending: PODIATRIST
Payer: COMMERCIAL

## 2023-04-13 DIAGNOSIS — M79.672 BILATERAL FOOT PAIN: Primary | ICD-10-CM

## 2023-04-13 DIAGNOSIS — M72.2 PLANTAR FASCIITIS OF LEFT FOOT: ICD-10-CM

## 2023-04-13 DIAGNOSIS — M62.81 MUSCLE WEAKNESS: ICD-10-CM

## 2023-04-13 DIAGNOSIS — M76.62 LEFT ACHILLES TENDINITIS: ICD-10-CM

## 2023-04-13 DIAGNOSIS — M79.671 BILATERAL FOOT PAIN: Primary | ICD-10-CM

## 2023-04-13 PROCEDURE — 97112 NEUROMUSCULAR REEDUCATION: CPT | Mod: PN,CQ

## 2023-04-13 PROCEDURE — 97110 THERAPEUTIC EXERCISES: CPT | Mod: PN,CQ

## 2023-04-17 NOTE — PROGRESS NOTES
"[facility]   Physical Therapy Treatment Note     Name: Adama Ortiz  Clinic Number: [id number]    Therapy Diagnosis:   No diagnosis found.            Physician: Oliva Butler DPM    Visit Date: 4/18/2023      Physician: Oliva Butler DPM     Physician Orders: PT Eval and Treat   Medical Diagnosis from Referral: M79.671,M79.672 (ICD-10-CM) - Pain in both feet  Evaluation Date: 2/13/2023  Authorization Period Expiration: 1/24/2024  Plan of Care Expiration: 4/10/2023  Progress Note Due: 4/9/2023  Visit # / Visits authorized: 11/eval + 25      PTA Visit: 1/5    Precautions: Standard      Time In:705AM  Time Out: 802AM  Total Appointment Time (timed & untimed codes): 57 minutes         SUBJECTIVE      Pt reports: Her pain is doing good today. It wasn't good last week because she volunteered and had to going up and down a ladder. Her and her coworkers have decided to do a yoga stretch at lunch and she thinks that is really helping. She does feel like she is getting better overall but when she does things out of the ordinary it bothers her.     She was compliant with home exercise program.  Response to previous treatment: Sore  Functional change: N/A    Pain: 0/10  Location: bilateral feet      OBJECTIVE     Objective Measures updated at progress report unless specified.       Active Range of Motion:   Ankle Right Left   DF (knee extended) 8 3   Plantarflexion 45 35   Inversion 30 30   Eversion 10 10      Palpation: tenderness and pain of B gastroc and soleus     Treatment     Adama received the treatments listed below:      therapeutic exercises to develop strength and ROM for 30 minutes including:    Recumbent bike  8'  Ankle 4- way GTB 2 x 15ea (bilat)  Gostroc/soleus stretch 5 x 20"  Standing Heel raise 2x10 2 up 1 down  Hip abd band across feet, YTB 2x10 nicole  Bent knee soleus raise 2 x 10    neuromuscular re-education activities to improve: Balance, Coordination, and " "Proprioception for 27 minutes. The following activities were included:    Tandem walking on foam 4 laps  SL balance 2 x 30"   NBOS on foam c/ ball toss x 20  Tandem balance on foam with 2 x 20"  NBOS on foam with 7.5 KB pass x 10   [place] carries 15 Kbs with march 2 laps     manual therapy techniques: Joint mobilizations were applied to the: L foot for 00  minutes, including:  L STM to peroneals, gastroc      therapeutic activities to improve functional performance for 00  minutes, including:      gait training to improve functional mobility and safety for 00  minutes, including:      Patient Education and Home Exercises     Education provided:   - Pt educated in dx, prognosis, POC, and HEP to include activity modification to aide in symptom modulation.      Written Home Exercises Provided: yes. Exercises were reviewed and Adama was able to demonstrate them prior to the end of the session.  Adama demonstrated good  understanding of the education provided. See EMR under Patient Instructions for exercises provided during therapy sessions.      ASSESSMENT   Adama presents to session without c/o pain. She did have a recent flare up after going up and down a ladder. Continued progressing strength and stability and load tolerance. Pt continues to demo weakness and stability issues. Will continue to progress as tolerated.       Patient prognosis is Good.   Patient will benefit from skilled outpatient Physical Therapy to address the deficits stated above and in the chart below, provide patient /family education, and to maximize patientt's level of independence.      Plan of care discussed with patient: Yes  Patient's spiritual, cultural and educational needs considered and patient is agreeable to the plan of care and goals as stated below:      Anticipated Barriers for therapy: none     Goals:   Short Term Goals:  Pt will be ind w/ HEP w/in 2 wks. Goal met 3-9-23  Pt will report a dec of at least 2pts on 0-10 scale " (per MCID) in foot pain within 3 wks for symptom modulation.  Goal met 3-9-23  3.   Pt will demonstrate L ankle DF AROM to at least 8deg for improved gait mechanics within 3 wks.  Goal met 3-9-23     Long Term Goals:  Pt will demonstrate ability to perform 10 SL calf raises w/o heel pain for improved walking tolerance within 6 wks.   Pt will report being able to stand on her feet for at least 2 hrs w/o heel pain > 2/10 for improved tolerance with cooking within 6 wks.   Pt will report being able to walk for at least 1 hr w/o heel pain >2/10 for improved ability with grocery shopping within 6 wks.        PLAN     Continue to improve nicole ankle ROM and strength.    Dax Cole, PTA ,   04/17/2023

## 2023-04-18 ENCOUNTER — CLINICAL SUPPORT (OUTPATIENT)
Dept: REHABILITATION | Facility: HOSPITAL | Age: 48
End: 2023-04-18
Attending: PODIATRIST
Payer: COMMERCIAL

## 2023-04-18 DIAGNOSIS — M79.671 BILATERAL FOOT PAIN: Primary | ICD-10-CM

## 2023-04-18 DIAGNOSIS — M79.672 BILATERAL FOOT PAIN: Primary | ICD-10-CM

## 2023-04-18 DIAGNOSIS — M76.62 LEFT ACHILLES TENDINITIS: ICD-10-CM

## 2023-04-18 DIAGNOSIS — M72.2 PLANTAR FASCIITIS OF LEFT FOOT: ICD-10-CM

## 2023-04-18 DIAGNOSIS — M62.81 MUSCLE WEAKNESS: ICD-10-CM

## 2023-04-18 PROCEDURE — 97110 THERAPEUTIC EXERCISES: CPT | Mod: PN

## 2023-04-18 NOTE — PROGRESS NOTES
"[facility]   Physical Therapy Treatment Note     Name: Adama Ortiz  Clinic Number: [id number]    Therapy Diagnosis:   Encounter Diagnoses   Name Primary?    Bilateral foot pain Yes    Muscle weakness     Plantar fasciitis of left foot     Left Achilles tendinitis        Physician: Oliva Butler DPM    Visit Date: 4/18/2023     Physician Orders: PT Eval and Treat   Medical Diagnosis from Referral: M79.671,M79.672 (ICD-10-CM) - Pain in both feet  Evaluation Date: 2/13/2023  Authorization Period Expiration: 1/24/2024  Plan of Care Expiration: 4/10/2023  Progress Note Due: 4/9/2023  Visit # / Visits authorized: 12/eval      PTA Visit: 0/5    Precautions: Standard      Time In: 1:45 AM  Time Out: 2:30 AM  Total Appointment Time (timed & untimed codes): 45 minutes         SUBJECTIVE      Pt reports: she has a new pain in her R foot that just started today.    She was compliant with home exercise program.  Response to previous treatment: Sore  Functional change: N/A    Pain: 0/10  Location: bilateral feet      OBJECTIVE     Objective Measures updated at progress report unless specified.       Active Range of Motion:   Ankle Right 4/18 Left 4/18   DF (knee extended) 8 8 3 6 AROM, 15 PROM)   Plantarflexion 45 45 35 40   Inversion 30 30 30 30   Eversion 10 15 10 15      Single leg calf raise: initial measurements: R 5/25 (pain), L 0/25 (pain)-- 4/18/2023 measurements: 10 R (pain top of foot), 7/10 L pain and fatigue- decreased height with calf raises.  Single limb stance:  initial measurements: R 10sec, L 2 sec-- 4/18/2023 measurements: 30 sec R, 16 sec L    Joint Mobility: Hypomobile Alvin TCJ AP    Treatment     Adama received the treatments listed below:      therapeutic exercises to develop strength and ROM for 30 minutes including:  Reassessment  Pt education  Recumbent bike  8'  Ankle 4- way GTB 2 x 15ea (bilat)  Gostroc/soleus stretch 5 x 20"  Standing Heel raise 2x10 2 up 1 down  Hip " "abd band across feet, YTB 2x10 nicole  Bent knee soleus raise 2 x 10    neuromuscular re-education activities to improve: Balance, Coordination, and Proprioception for 0 minutes. The following activities were included:    Tandem walking on foam 4 laps  SL balance 2 x 30"   NBOS on foam c/ ball toss x 20  Tandem balance on foam with 2 x 20"  NBOS on foam with 7.5 KB pass x 10   [place] carries 15 Kbs with march 2 laps     manual therapy techniques: Joint mobilizations were applied to the: L foot for 00  minutes, including:  L STM to peroneals, gastroc      therapeutic activities to improve functional performance for 00  minutes, including:      gait training to improve functional mobility and safety for 00  minutes, including:      Patient Education and Home Exercises     Education provided:   - Pt educated in dx, prognosis, POC, and HEP to include activity modification to aide in symptom modulation.   - attempting to use shoe inserts to decrease pain with ambulation.     Written Home Exercises Provided: yes. Exercises were reviewed and Adama was able to demonstrate them prior to the end of the session.  Adama demonstrated good  understanding of the education provided. See EMR under Patient Instructions for exercises provided during therapy sessions.      ASSESSMENT   Adama arrived to therapy reporting mild pain in L foot with activity, and a new pain on the dorsal surface of her R foot with ambulation. A reassessment was performed today and she was noted to have improvements in L ankle range of motion, strength, and balance- though there are still deficits present. She also reported a new pain in her R foot- was noted to have excellent mobility in midfoot joints. Pt would benefit from a few additional weeks of therapy to continue focusing on intrinsic strengthening and ankle mobility to continue improving functional mobility and tolerance for ADLs. Provided education on use of shoe inserts to reduce pain in " plantar calcaneous with heel strike and to reduce foot pain with ambulation. Pt had to leave session a little early today to get back to work so did not get through all of her usual exercises today, plan to resume next visit.      Patient prognosis is Good.   Patient will benefit from skilled outpatient Physical Therapy to address the deficits stated above and in the chart below, provide patient /family education, and to maximize patientt's level of independence.      Plan of care discussed with patient: Yes  Patient's spiritual, cultural and educational needs considered and patient is agreeable to the plan of care and goals as stated below:      Anticipated Barriers for therapy: none     Goals:   Short Term Goals:  Pt will be ind w/ HEP w/in 2 wks. Goal met 3-9-23  Pt will report a dec of at least 2pts on 0-10 scale (per MCID) in foot pain within 3 wks for symptom modulation.  Goal met 3-9-23  3.   Pt will demonstrate L ankle DF AROM to at least 8deg for improved gait mechanics within 3 wks.  Goal met 3-9-23     Long Term Goals:  Pt will demonstrate ability to perform 10 SL calf raises w/o heel pain for improved walking tolerance within 6 wks.   Pt will report being able to stand on her feet for at least 2 hrs w/o heel pain > 2/10 for improved tolerance with cooking within 6 wks.   Pt will report being able to walk for at least 1 hr w/o heel pain >2/10 for improved ability with grocery shopping within 6 wks.        PLAN     Continue to improve nicole ankle ROM and strength.    Jayesh Starks, PT ,   04/18/2023

## 2023-04-24 NOTE — PROGRESS NOTES
[facility]   Physical Therapy Treatment Note     Name: Adama Ortiz  Clinic Number: [id number]    Therapy Diagnosis:   Encounter Diagnoses   Name Primary?    Bilateral foot pain Yes    Muscle weakness     Plantar fasciitis of left foot     Left Achilles tendinitis          Physician: Oliva Butler DPM    Visit Date: 4/25/2023     Physician Orders: PT Eval and Treat   Medical Diagnosis from Referral: M79.671,M79.672 (ICD-10-CM) - Pain in both feet  Evaluation Date: 2/13/2023  Authorization Period Expiration: 1/24/2024  Plan of Care Expiration: 4/10/2023  Progress Note Due: 4/9/2023  Visit # / Visits authorized: 12/eval      PTA Visit: 0/5    Precautions: Standard      Time In: 712 AM (Pt arrived late)  Time Out: 805AM  Total Appointment Time (timed & untimed codes): 53 minutes         SUBJECTIVE      Pt reports: She just has a little pulling today. She had pain after last visit because of the single leg calf raise.     She was compliant with home exercise program.  Response to previous treatment: Sore  Functional change: N/A    Pain: 1/10  Location: bilateral feet      OBJECTIVE     Objective Measures updated at progress report unless specified.       Active Range of Motion:   Ankle Right 4/18 Left 4/18   DF (knee extended) 8 8 3 6 AROM, 15 PROM)   Plantarflexion 45 45 35 40   Inversion 30 30 30 30   Eversion 10 15 10 15      Single leg calf raise: initial measurements: R 5/25 (pain), L 0/25 (pain)-- 4/18/2023 measurements: 10 R (pain top of foot), 7/10 L pain and fatigue- decreased height with calf raises.  Single limb stance:  initial measurements: R 10sec, L 2 sec-- 4/18/2023 measurements: 30 sec R, 16 sec L    Joint Mobility: Hypomobile Alvin TCJ AP    Treatment     Adama received the treatments listed below:      therapeutic exercises to develop strength and ROM for 28 minutes including:    Recumbent bike  8' - np   Ankle 4- way GTB 2 x 15ea (bilat)  Gostroc/soleus stretch 5 x  "20"  Standing Heel raise 2x10 2 up 1 down  Hip abd band across feet, YTB 2x10 nicole - np  Bent knee soleus raise 2 x 10  +[place]  x 1 trials  +Jesus x 30    neuromuscular re-education activities to improve: Balance, Coordination, and Proprioception for 25 minutes. The following activities were included:    Tandem walking on foam 4 laps  SL balance 2 x 30"   NBOS on foam c/ ball toss x 20- np  Tandem balance on foam with 2 x 20"  NBOS on foam with 7.5 KB pass x 10, x 10 tandem no foam   [place] carries 15 Kbs with march 2 laps     manual therapy techniques: Joint mobilizations were applied to the: L foot for 00  minutes, including:  L STM to peroneals, gastroc      therapeutic activities to improve functional performance for 00  minutes, including:      gait training to improve functional mobility and safety for 00  minutes, including:      Patient Education and Home Exercises     Education provided:   - Pt educated in dx, prognosis, POC, and HEP to include activity modification to aide in symptom modulation.   - attempting to use shoe inserts to decrease pain with ambulation.     Written Home Exercises Provided: yes. Exercises were reviewed and Adama was able to demonstrate them prior to the end of the session.  Adama demonstrated good  understanding of the education provided. See EMR under Patient Instructions for exercises provided during therapy sessions.      ASSESSMENT   Adama reports to session with mild pain. Per most recent assessment foot intrinsic strengthening was introduced. Continued emphasis on strength and stability. Pt is mostly challenged with single leg activities including balance and per pts subjective report SL calf raises. Will continue to focus on exercises that challenge single leg strength and balance. Some progression limited as pt arrived late to session.         Patient prognosis is Good.   Patient will benefit from skilled outpatient Physical Therapy to address the deficits " stated above and in the chart below, provide patient /family education, and to maximize patientt's level of independence.      Plan of care discussed with patient: Yes  Patient's spiritual, cultural and educational needs considered and patient is agreeable to the plan of care and goals as stated below:      Anticipated Barriers for therapy: none     Goals:   Short Term Goals:  Pt will be ind w/ HEP w/in 2 wks. Goal met 3-9-23  Pt will report a dec of at least 2pts on 0-10 scale (per MCID) in foot pain within 3 wks for symptom modulation.  Goal met 3-9-23  3.   Pt will demonstrate L ankle DF AROM to at least 8deg for improved gait mechanics within 3 wks.  Goal met 3-9-23     Long Term Goals:  Pt will demonstrate ability to perform 10 SL calf raises w/o heel pain for improved walking tolerance within 6 wks.   Pt will report being able to stand on her feet for at least 2 hrs w/o heel pain > 2/10 for improved tolerance with cooking within 6 wks.   Pt will report being able to walk for at least 1 hr w/o heel pain >2/10 for improved ability with grocery shopping within 6 wks.        PLAN     Continue to improve nicole ankle ROM and strength.    Dax Cole, PTA ,   04/25/2023

## 2023-04-25 ENCOUNTER — CLINICAL SUPPORT (OUTPATIENT)
Dept: REHABILITATION | Facility: HOSPITAL | Age: 48
End: 2023-04-25
Attending: PODIATRIST
Payer: COMMERCIAL

## 2023-04-25 DIAGNOSIS — M76.62 LEFT ACHILLES TENDINITIS: ICD-10-CM

## 2023-04-25 DIAGNOSIS — M79.672 BILATERAL FOOT PAIN: Primary | ICD-10-CM

## 2023-04-25 DIAGNOSIS — M62.81 MUSCLE WEAKNESS: ICD-10-CM

## 2023-04-25 DIAGNOSIS — M79.671 BILATERAL FOOT PAIN: Primary | ICD-10-CM

## 2023-04-25 DIAGNOSIS — M72.2 PLANTAR FASCIITIS OF LEFT FOOT: ICD-10-CM

## 2023-04-25 PROCEDURE — 97112 NEUROMUSCULAR REEDUCATION: CPT | Mod: PN,CQ

## 2023-04-25 PROCEDURE — 97110 THERAPEUTIC EXERCISES: CPT | Mod: PN,CQ

## 2023-04-26 ENCOUNTER — CLINICAL SUPPORT (OUTPATIENT)
Dept: REHABILITATION | Facility: HOSPITAL | Age: 48
End: 2023-04-26
Attending: PODIATRIST
Payer: COMMERCIAL

## 2023-04-26 DIAGNOSIS — M62.81 MUSCLE WEAKNESS: ICD-10-CM

## 2023-04-26 DIAGNOSIS — M79.671 BILATERAL FOOT PAIN: Primary | ICD-10-CM

## 2023-04-26 DIAGNOSIS — M76.62 LEFT ACHILLES TENDINITIS: ICD-10-CM

## 2023-04-26 DIAGNOSIS — M79.672 BILATERAL FOOT PAIN: Primary | ICD-10-CM

## 2023-04-26 DIAGNOSIS — M72.2 PLANTAR FASCIITIS OF LEFT FOOT: ICD-10-CM

## 2023-04-26 PROCEDURE — 97110 THERAPEUTIC EXERCISES: CPT | Mod: PN

## 2023-04-26 PROCEDURE — 97112 NEUROMUSCULAR REEDUCATION: CPT | Mod: PN

## 2023-04-26 NOTE — PROGRESS NOTES
"[facility]   Physical Therapy Treatment Note     Name: Adama Ortiz  Clinic Number: [id number]    Therapy Diagnosis:   Encounter Diagnoses   Name Primary?    Bilateral foot pain Yes    Muscle weakness     Plantar fasciitis of left foot     Left Achilles tendinitis            Physician: Oliva Butler DPM    Visit Date: 4/26/2023     Physician Orders: PT Eval and Treat   Medical Diagnosis from Referral: M79.671,M79.672 (ICD-10-CM) - Pain in both feet  Evaluation Date: 2/13/2023  Authorization Period Expiration: 12/31/2023  Plan of Care Expiration: 5/10/2023  Progress Note Due: 5/18/2023  Visit # / Visits authorized: 13/25      PTA Visit: 0/5    Precautions: Standard      Time In: 1:50 pm  Time Out: 2:43 pm   Total Appointment Time (timed & untimed codes):53 minutes         SUBJECTIVE      Pt reports:that she does not have any B feet pain. She had just a little pulling. She did experience some soreness.     She was compliant with home exercise program.  Response to previous treatment: Sore  Functional change: N/A    Pain: 1/10  Location: bilateral feet      OBJECTIVE     Objective Measures updated at progress report unless specified.     Treatment     Adama received the treatments listed below:      therapeutic exercises to develop strength and ROM for 28 minutes including:    Recumbent bike  8'   Ankle 4- way GTB 2 x 15ea (bilat)  Gostroc/soleus stretch 3 x 30"  Bent knee soleus raise 5 x 30  Standing Heel raise 2x10 2 up 1 down  Hip abd band across feet, YTB 2x10 nicole on foam   +[place]  x 1 trials  +Jesus x 30    neuromuscular re-education activities to improve: Balance, Coordination, and Proprioception for 25 minutes. The following activities were included:    Tandem walking on foam 4 laps  SL balance 3 x 30"   NBOS on foam c/ ball toss x 20- np  Tandem balance on foam with 3 x 300"  NBOS on foam with 7.5 KB pass x 10, x 10 tandem no foam   [place] carries 15 Kbs with march 3 " laps     manual therapy techniques: Joint mobilizations were applied to the: L foot for 00  minutes, including:  L STM to peroneals, gastroc      therapeutic activities to improve functional performance for 00  minutes, including:      gait training to improve functional mobility and safety for 00  minutes, including:      Patient Education and Home Exercises     Education provided:   - Pt educated in dx, prognosis, POC, and HEP to include activity modification to aide in symptom modulation.   - attempting to use shoe inserts to decrease pain with ambulation.     Written Home Exercises Provided: yes. Exercises were reviewed and Adama was able to demonstrate them prior to the end of the session.  Adama demonstrated good  understanding of the education provided. See EMR under Patient Instructions for exercises provided during therapy sessions.      ASSESSMENT   Adama reports to session with No pain, but she just feel pulling under the heel. Same exercises performed as last visit except for heel raises since cause pain at TCJ.  Continued emphasis on strength and stability. Pt is mostly challenged with single leg activities including balance and per pts subjective report SL calf raises. Will continue to focus on exercises that challenge single leg strength and balance. Some progression limited as pt arrived late to session.         Patient prognosis is Good.   Patient will benefit from skilled outpatient Physical Therapy to address the deficits stated above and in the chart below, provide patient /family education, and to maximize patientt's level of independence.      Plan of care discussed with patient: Yes  Patient's spiritual, cultural and educational needs considered and patient is agreeable to the plan of care and goals as stated below:      Anticipated Barriers for therapy: none     Goals:   Short Term Goals:  Pt will be ind w/ HEP w/in 2 wks. Goal met 3-9-23  Pt will report a dec of at least 2pts on 0-10  scale (per MCID) in foot pain within 3 wks for symptom modulation.  Goal met 3-9-23  3.   Pt will demonstrate L ankle DF AROM to at least 8deg for improved gait mechanics within 3 wks.  Goal met 3-9-23     Long Term Goals:  Pt will demonstrate ability to perform 10 SL calf raises w/o heel pain for improved walking tolerance within 6 wks.   Pt will report being able to stand on her feet for at least 2 hrs w/o heel pain > 2/10 for improved tolerance with cooking within 6 wks.   Pt will report being able to walk for at least 1 hr w/o heel pain >2/10 for improved ability with grocery shopping within 6 wks.        PLAN     Continue to improve nicole ankle ROM and strength.    Florentino Winkler, PT ,   04/26/2023

## 2023-05-03 NOTE — PROGRESS NOTES
"[facility]   Physical Therapy Treatment Note     Name: Adama Ortiz  Clinic Number: [id number]    Therapy Diagnosis:   Encounter Diagnoses   Name Primary?    Bilateral foot pain Yes    Muscle weakness     Plantar fasciitis of left foot     Left Achilles tendinitis          Physician: Oliva Butler DPM    Visit Date: 5/4/2023     Physician Orders: PT Eval and Treat   Medical Diagnosis from Referral: M79.671,M79.672 (ICD-10-CM) - Pain in both feet  Evaluation Date: 2/13/2023  Authorization Period Expiration: 12/31/2023  Plan of Care Expiration: 5/10/2023  Progress Note Due: 5/18/2023  Visit # / Visits authorized: 13/25      PTA Visit: 0/5    Precautions: Standard      Time In: 710AM (Pt late)  Time Out: 800AM   Total Appointment Time (timed & untimed codes): 50 minutes         SUBJECTIVE      Pt reports: This week has been rough. She has had constant pain and "pulling" all week. She feels like the ankle joints are hurting more which she thinks might be due to the single leg stuff we do here. She has pain first thing in the morning when she wakes up and starts walking.     She was compliant with home exercise program.  Response to previous treatment: Sore  Functional change: N/A    Pain: 2/10  Location: bilateral feet      OBJECTIVE     Objective Measures updated at progress report unless specified.     Treatment     Adama received the treatments listed below:      therapeutic exercises to develop strength and ROM for 45 minutes including:    Recumbent bike  8'   Ankle 4- way GTB 2 x 15ea (bilat)  Gostroc/soleus stretch 3 x 30"  +Calf raise with 1KG ball between heel 2 x 15  +SL calf raise on shuttle 2 blk 2 x 10  ea      neuromuscular re-education activities to improve: Balance, Coordination, and Proprioception for 00 minutes. The following activities were included:    Tandem walking on foam 4 laps  SL balance 3 x 30"   NBOS on foam c/ ball toss x 20-   Tandem balance on foam with 3 x " "30"  NBOS on foam with 7.5 KB pass x 10, x 10 tandem no foam   [place] carries 15 Kbs with march 3 laps     manual therapy techniques: Joint mobilizations were applied to the: L foot for 05 minutes, including:  R TCJ distraction       therapeutic activities to improve functional performance for 00  minutes, including:      gait training to improve functional mobility and safety for 00  minutes, including:      Patient Education and Home Exercises     Education provided:   - Pt educated in dx, prognosis, POC, and HEP to include activity modification to aide in symptom modulation.   - attempting to use shoe inserts to decrease pain with ambulation.     Written Home Exercises Provided: yes. Exercises were reviewed and Adama was able to demonstrate them prior to the end of the session.  Adama demonstrated good  understanding of the education provided. See EMR under Patient Instructions for exercises provided during therapy sessions.      ASSESSMENT   Adama tolerated session fairly well. Pt continues to have pain and "pulling" sensation in L heel and pain within TCJ of R ankle. As pts pain reports pain with single leg calf raise exercise was performed on shuttle with pt reporting no pain though pt was challenged due to weakness. Due to pts late arrival session was limited. Pt will benefit from continued extrinsic strengthening and stability.         Patient prognosis is Good.   Patient will benefit from skilled outpatient Physical Therapy to address the deficits stated above and in the chart below, provide patient /family education, and to maximize patientt's level of independence.      Plan of care discussed with patient: Yes  Patient's spiritual, cultural and educational needs considered and patient is agreeable to the plan of care and goals as stated below:      Anticipated Barriers for therapy: none     Goals:   Short Term Goals:  Pt will be ind w/ HEP w/in 2 wks. Goal met 3-9-23  Pt will report a dec of at " least 2pts on 0-10 scale (per MCID) in foot pain within 3 wks for symptom modulation.  Goal met 3-9-23  3.   Pt will demonstrate L ankle DF AROM to at least 8deg for improved gait mechanics within 3 wks.  Goal met 3-9-23     Long Term Goals:  Pt will demonstrate ability to perform 10 SL calf raises w/o heel pain for improved walking tolerance within 6 wks.   Pt will report being able to stand on her feet for at least 2 hrs w/o heel pain > 2/10 for improved tolerance with cooking within 6 wks.   Pt will report being able to walk for at least 1 hr w/o heel pain >2/10 for improved ability with grocery shopping within 6 wks.        PLAN     Continue to improve nicole ankle ROM and strength.    Dax Cole, PTA ,   05/05/2023

## 2023-05-04 ENCOUNTER — CLINICAL SUPPORT (OUTPATIENT)
Dept: REHABILITATION | Facility: HOSPITAL | Age: 48
End: 2023-05-04
Payer: COMMERCIAL

## 2023-05-04 DIAGNOSIS — M76.62 LEFT ACHILLES TENDINITIS: ICD-10-CM

## 2023-05-04 DIAGNOSIS — M79.672 BILATERAL FOOT PAIN: Primary | ICD-10-CM

## 2023-05-04 DIAGNOSIS — M62.81 MUSCLE WEAKNESS: ICD-10-CM

## 2023-05-04 DIAGNOSIS — M72.2 PLANTAR FASCIITIS OF LEFT FOOT: ICD-10-CM

## 2023-05-04 DIAGNOSIS — M79.671 BILATERAL FOOT PAIN: Primary | ICD-10-CM

## 2023-05-04 PROCEDURE — 97110 THERAPEUTIC EXERCISES: CPT | Mod: PN,CQ

## 2023-05-11 NOTE — PLAN OF CARE
[facility]  Physical Therapy Plan of Care Note     Name: Adama Ortiz  Clinic Number: [id number]    Therapy Diagnosis:   Encounter Diagnoses   Name Primary?    Bilateral foot pain Yes    Muscle weakness     Plantar fasciitis of left foot     Left Achilles tendinitis      Physician: Oliva Butler DPM    Visit Date: 4/18/2023    Physician Orders: PT Eval and Treat   Medical Diagnosis from Referral: M79.671,M79.672 (ICD-10-CM) - Pain in both feet  Evaluation Date: 2/13/2023  Authorization Period Expiration: 1/24/2024  Plan of Care Expiration: 4/10/2023  Progress Note Due: 4/9/2023  Visit # / Visits authorized: 12/eval      PTA Visit: 0/5     Precautions: Standard   Functional Level Prior to Evaluation:  independent    SUBJECTIVE     Update: Pt reports: she has a new pain in her R foot that just started today.     She was compliant with home exercise program.  Response to previous treatment: Sore  Functional change: N/A     Pain: 0/10  Location: bilateral feet      OBJECTIVE     Update: Active Range of Motion:   Ankle Right 4/18 Left 4/18   DF (knee extended) 8 8 3 6 AROM, 15 PROM)   Plantarflexion 45 45 35 40   Inversion 30 30 30 30   Eversion 10 15 10 15      Single leg calf raise: initial measurements: R 5/25 (pain), L 0/25 (pain)-- 4/18/2023 measurements: 10 R (pain top of foot), 7/10 L pain and fatigue- decreased height with calf raises.  Single limb stance:  initial measurements: R 10sec, L 2 sec-- 4/18/2023 measurements: 30 sec R, 16 sec L     Joint Mobility: Hypomobile Alvin TCJ AP    ASSESSMENT     Update:   ASSESSMENT   Adama arrived to therapy reporting mild pain in L foot with activity, and a new pain on the dorsal surface of her R foot with ambulation. A reassessment was performed today and she was noted to have improvements in L ankle range of motion, strength, and balance- though there are still deficits present. She also reported a new pain in her R foot- was noted to have  excellent mobility in midfoot joints. Pt would benefit from a few additional weeks of therapy to continue focusing on intrinsic strengthening and ankle mobility to continue improving functional mobility and tolerance for ADLs. Provided education on use of shoe inserts to reduce pain in plantar calcaneous with heel strike and to reduce foot pain with ambulation. Pt had to leave session a little early today to get back to work so did not get through all of her usual exercises today, plan to resume next visit.    New Short Term Goals Status:   Continue working toward previously set short term goals.  Long Term Goal Status: continue per initial plan of care.  Reasons for Recertification of Therapy:   Pt has made some progress, but there are still deficits present that would benefit from continued therapy to address.     GOALS  Short Term Goals:  Pt will be ind w/ HEP w/in 2 wks. Goal met 3-9-23  Pt will report a dec of at least 2pts on 0-10 scale (per MCID) in foot pain within 3 wks for symptom modulation.  Goal met 3-9-23  3.   Pt will demonstrate L ankle DF AROM to at least 8deg for improved gait mechanics within 3 wks.  Goal met 3-9-23     Long Term Goals:  Pt will demonstrate ability to perform 10 SL calf raises w/o heel pain for improved walking tolerance within 6 wks.   Pt will report being able to stand on her feet for at least 2 hrs w/o heel pain > 2/10 for improved tolerance with cooking within 6 wks.   Pt will report being able to walk for at least 1 hr w/o heel pain >2/10 for improved ability with grocery shopping within 6 wks.        PLAN     Updated Certification Period: 4/18/2023 to 6/18/2023   Recommended Treatment Plan: 2 times per week for 8 weeks:  Manual Therapy, Moist Heat/ Ice, Neuromuscular Re-ed, Patient Education, Therapeutic Activities, Therapeutic Exercise, and kinesiotaping and dry needling  Other Recommendations: none    Jayesh Starks, PT

## 2023-05-22 ENCOUNTER — CLINICAL SUPPORT (OUTPATIENT)
Dept: REHABILITATION | Facility: HOSPITAL | Age: 48
End: 2023-05-22
Payer: COMMERCIAL

## 2023-05-22 DIAGNOSIS — M62.81 MUSCLE WEAKNESS: ICD-10-CM

## 2023-05-22 DIAGNOSIS — M72.2 PLANTAR FASCIITIS OF LEFT FOOT: ICD-10-CM

## 2023-05-22 DIAGNOSIS — M76.62 LEFT ACHILLES TENDINITIS: ICD-10-CM

## 2023-05-22 DIAGNOSIS — M79.672 BILATERAL FOOT PAIN: Primary | ICD-10-CM

## 2023-05-22 DIAGNOSIS — M79.671 BILATERAL FOOT PAIN: Primary | ICD-10-CM

## 2023-05-22 PROCEDURE — 97110 THERAPEUTIC EXERCISES: CPT | Mod: PN

## 2023-05-22 NOTE — PROGRESS NOTES
[facility]   Physical Therapy Treatment Note     Name: Adama Ortiz  Clinic Number: [id number]    Therapy Diagnosis:   Encounter Diagnoses   Name Primary?    Bilateral foot pain Yes    Muscle weakness     Plantar fasciitis of left foot     Left Achilles tendinitis            Physician: Oliva Butler DPM    Visit Date: 5/22/2023     Physician Orders: PT Eval and Treat   Medical Diagnosis from Referral: M79.671,M79.672 (ICD-10-CM) - Pain in both feet  Evaluation Date: 2/13/2023  Authorization Period Expiration: 12/31/2023  Plan of Care Expiration: 6/22/2023  Progress Note Due: 6/22/2023  Visit # / Visits authorized: 14/25      PTA Visit: 0/5    Precautions: Standard      Time In: 710AM (Pt late)  Time Out: 7:55 am  Total Appointment Time (timed & untimed codes): 25 minutes         SUBJECTIVE      Pt reports: that she is feeling a lot better. She states therapy has helped her. She states the past 2 weeks she has been standing for about 8 hours per day. She says this aggravates the pain. She states she feels pain first step in the morning. Pt states prior this past 2 weeks, she felt therapy was helping a lot. Pt states she has intermittent B foot pain. Pain is not constant anymore. Pt states she does not know what riggers the pain.     She was compliant with home exercise program.  Response to previous treatment: Sore  Functional change: N/A    Pain: 2/10  Location: bilateral feet      OBJECTIVE     Objective Measures updated at progress report unless specified.         Active Range of Motion:   Ankle Right Left   DF (knee extended) 8 7   Plantarflexion 45 35   Inversion 30 30   Eversion 10 10      Palpation: tenderness and pain of B gastroc and soleus     Treatment     Adama received the treatments listed below:      therapeutic exercises to develop strength and ROM for 45 minutes including:    Recumbent bike  8'   Ankle 4- way GTB 2 x 15ea (bilat)  Gostroc/soleus stretch 3 x  "30"  +Calf raise with 1KG ball between heel 2 x 15  +SL calf raise on shuttle 2 blk 2 x 10  ea      neuromuscular re-education activities to improve: Balance, Coordination, and Proprioception for 00 minutes. The following activities were included:    Tandem walking on foam 4 laps  SL balance 3 x 30"   NBOS on foam c/ ball toss x 20-   Tandem balance on foam with 3 x 30"  NBOS on foam with 7.5 KB pass x 10, x 10 tandem no foam   [place] carries 15 Kbs with march 3 laps     manual therapy techniques: Joint mobilizations were applied to the: L foot for 05 minutes, including:  R TCJ distraction       therapeutic activities to improve functional performance for 00  minutes, including:      gait training to improve functional mobility and safety for 00  minutes, including:      Patient Education and Home Exercises     Education provided:   - Pt educated in dx, prognosis, POC, and HEP to include activity modification to aide in symptom modulation.   - attempting to use shoe inserts to decrease pain with ambulation.     Written Home Exercises Provided: yes. Exercises were reviewed and Adama was able to demonstrate them prior to the end of the session.  Adama demonstrated good  understanding of the education provided. See EMR under Patient Instructions for exercises provided during therapy sessions.      ASSESSMENT   Adama tolerated session fairly well. Pt has been in therapy for about 3 months. Pt was re-assessed today. Pt has demonstrated improvement of B ankle pain during functional mobility at home and at work, but she still have intermittent pain. Pain is localized in the plantar fascia and distal achilles regions. Pt has improved B ankle DF AROM. Pt still have tenderness of B gastroc. Pt has met 0/3 STGs. Overall, pt has improved in therapy, but she still have intermittent B foot pain. PT and pt agreed to place therapy on hold. Pt will return to M.D for further evaluation of the problem.       Patient prognosis " is Good.   Patient will benefit from skilled outpatient Physical Therapy to address the deficits stated above and in the chart below, provide patient /family education, and to maximize patientt's level of independence.      Plan of care discussed with patient: Yes  Patient's spiritual, cultural and educational needs considered and patient is agreeable to the plan of care and goals as stated below:      Anticipated Barriers for therapy: none     Goals:   Short Term Goals:  Pt will be ind w/ HEP w/in 2 wks. Goal met 3-9-23  Pt will report a dec of at least 2pts on 0-10 scale (per MCID) in foot pain within 3 wks for symptom modulation.  Goal met 3-9-23  3.   Pt will demonstrate L ankle DF AROM to at least 8deg for improved gait mechanics within 3 wks.  Goal met 3-9-23     Long Term Goals:  Pt will demonstrate ability to perform 10 SL calf raises w/o heel pain for improved walking tolerance within 6 wks. Goal not met 5-22-23  Pt will report being able to stand on her feet for at least 2 hrs w/o heel pain > 2/10 for improved tolerance with cooking within 6 wks. Goal partial met 5-22-23  Pt will report being able to walk for at least 1 hr w/o heel pain >2/10 for improved ability with grocery shopping within 6 wks. Goal partial met 5-22-23       PLAN     Plan to return to M.D for further evaluation of the problem    Florentino Winkler, PT   05/22/2023

## 2023-05-22 NOTE — PLAN OF CARE
[facility]   Physical Therapy Treatment Note     Name: Adama Ortiz  Clinic Number: [id number]    Therapy Diagnosis:   Encounter Diagnoses   Name Primary?    Bilateral foot pain Yes    Muscle weakness     Plantar fasciitis of left foot     Left Achilles tendinitis            Physician: Oliva Butler DPM    Visit Date: 5/22/2023     Physician Orders: PT Eval and Treat   Medical Diagnosis from Referral: M79.671,M79.672 (ICD-10-CM) - Pain in both feet  Evaluation Date: 2/13/2023  Authorization Period Expiration: 12/31/2023  Plan of Care Expiration: 6/22/2023  Progress Note Due: 6/22/2023  Visit # / Visits authorized: 14/25      PTA Visit: 0/5    Precautions: Standard      Time In: 710AM (Pt late)  Time Out: 7:55 am  Total Appointment Time (timed & untimed codes): 25 minutes         SUBJECTIVE      Pt reports: that she is feeling a lot better. She states therapy has helped her. She states the past 2 weeks she has been standing for about 8 hours per day. She says this aggravates the pain. She states she feels pain first step in the morning. Pt states prior this past 2 weeks, she felt therapy was helping a lot. Pt states she has intermittent B foot pain. Pain is not constant anymore. Pt states she does not know what riggers the pain.     She was compliant with home exercise program.  Response to previous treatment: Sore  Functional change: N/A    Pain: 2/10  Location: bilateral feet      OBJECTIVE     Objective Measures updated at progress report unless specified.         Active Range of Motion:   Ankle Right Left   DF (knee extended) 8 7   Plantarflexion 45 35   Inversion 30 30   Eversion 10 10      Palpation: tenderness and pain of B gastroc and soleus     Treatment     Patient Education and Home Exercises     Education provided:   - Pt educated in dx, prognosis, POC, and HEP to include activity modification to aide in symptom modulation.   - attempting to use shoe inserts to decrease  pain with ambulation.     Written Home Exercises Provided: yes. Exercises were reviewed and Adama was able to demonstrate them prior to the end of the session.  Adama demonstrated good  understanding of the education provided. See EMR under Patient Instructions for exercises provided during therapy sessions.      ASSESSMENT   Adama tolerated session fairly well. Pt has been in therapy for about 3 months. Pt was re-assessed today. Pt has demonstrated improvement of B ankle pain during functional mobility at home and at work, but she still have intermittent pain. Pain is localized in the plantar fascia and distal achilles regions. Pt has improved B ankle DF AROM. Pt still have tenderness of B gastroc. Pt has met 0/3 STGs. Overall, pt has improved in therapy, but she still have intermittent B foot pain. PT and pt agreed to place therapy on hold. Pt will return to M.D for further evaluation of the problem.       Patient prognosis is Good.   Patient will benefit from skilled outpatient Physical Therapy to address the deficits stated above and in the chart below, provide patient /family education, and to maximize patientt's level of independence.      Plan of care discussed with patient: Yes  Patient's spiritual, cultural and educational needs considered and patient is agreeable to the plan of care and goals as stated below:      Anticipated Barriers for therapy: none     Goals:   Short Term Goals:  Pt will be ind w/ HEP w/in 2 wks. Goal met 3-9-23  Pt will report a dec of at least 2pts on 0-10 scale (per MCID) in foot pain within 3 wks for symptom modulation.  Goal met 3-9-23  3.   Pt will demonstrate L ankle DF AROM to at least 8deg for improved gait mechanics within 3 wks.  Goal met 3-9-23     Long Term Goals:  Pt will demonstrate ability to perform 10 SL calf raises w/o heel pain for improved walking tolerance within 6 wks. Goal not met 5-22-23  Pt will report being able to stand on her feet for at least 2 hrs  w/o heel pain > 2/10 for improved tolerance with cooking within 6 wks. Goal partial met 5-22-23  Pt will report being able to walk for at least 1 hr w/o heel pain >2/10 for improved ability with grocery shopping within 6 wks. Goal partial met 5-22-23       PLAN     Plan to return to M.D for further evaluation of the problem    Flornetino Winkler, PT   05/22/2023

## 2023-05-23 ENCOUNTER — APPOINTMENT (OUTPATIENT)
Dept: RADIOLOGY | Facility: HOSPITAL | Age: 48
End: 2023-05-23
Attending: PODIATRIST
Payer: COMMERCIAL

## 2023-05-23 ENCOUNTER — PATIENT MESSAGE (OUTPATIENT)
Dept: ADMINISTRATIVE | Facility: OTHER | Age: 48
End: 2023-05-23
Payer: COMMERCIAL

## 2023-05-23 DIAGNOSIS — M79.672 PAIN IN BOTH FEET: ICD-10-CM

## 2023-05-23 DIAGNOSIS — M79.671 PAIN IN BOTH FEET: ICD-10-CM

## 2023-05-23 PROCEDURE — 73630 X-RAY EXAM OF FOOT: CPT | Mod: TC,50,FY,PN

## 2023-05-23 PROCEDURE — 73630 X-RAY EXAM OF FOOT: CPT | Mod: 26,,, | Performed by: RADIOLOGY

## 2023-05-23 PROCEDURE — 73630 XR FOOT COMPLETE 3 VIEW BILATERAL: ICD-10-PCS | Mod: 26,,, | Performed by: RADIOLOGY

## 2023-05-24 ENCOUNTER — LAB VISIT (OUTPATIENT)
Dept: LAB | Facility: HOSPITAL | Age: 48
End: 2023-05-24
Attending: INTERNAL MEDICINE
Payer: COMMERCIAL

## 2023-05-24 DIAGNOSIS — I10 PRIMARY HYPERTENSION: ICD-10-CM

## 2023-05-24 LAB
ALBUMIN SERPL BCP-MCNC: 3.9 G/DL (ref 3.5–5.2)
ALP SERPL-CCNC: 80 U/L (ref 55–135)
ALT SERPL W/O P-5'-P-CCNC: 32 U/L (ref 10–44)
ANION GAP SERPL CALC-SCNC: 5 MMOL/L (ref 8–16)
AST SERPL-CCNC: 28 U/L (ref 10–40)
BASOPHILS # BLD AUTO: 0.05 K/UL (ref 0–0.2)
BASOPHILS NFR BLD: 0.6 % (ref 0–1.9)
BILIRUB SERPL-MCNC: 0.7 MG/DL (ref 0.1–1)
BUN SERPL-MCNC: 13 MG/DL (ref 6–20)
CALCIUM SERPL-MCNC: 9.3 MG/DL (ref 8.7–10.5)
CHLORIDE SERPL-SCNC: 109 MMOL/L (ref 95–110)
CHOLEST SERPL-MCNC: 188 MG/DL (ref 120–199)
CHOLEST/HDLC SERPL: 3.1 {RATIO} (ref 2–5)
CO2 SERPL-SCNC: 28 MMOL/L (ref 23–29)
CREAT SERPL-MCNC: 0.9 MG/DL (ref 0.5–1.4)
DIFFERENTIAL METHOD: ABNORMAL
EOSINOPHIL # BLD AUTO: 0.2 K/UL (ref 0–0.5)
EOSINOPHIL NFR BLD: 2.2 % (ref 0–8)
ERYTHROCYTE [DISTWIDTH] IN BLOOD BY AUTOMATED COUNT: 13.1 % (ref 11.5–14.5)
EST. GFR  (NO RACE VARIABLE): >60 ML/MIN/1.73 M^2
ESTIMATED AVG GLUCOSE: 120 MG/DL (ref 68–131)
GLUCOSE SERPL-MCNC: 98 MG/DL (ref 70–110)
HBA1C MFR BLD: 5.8 % (ref 4–5.6)
HCT VFR BLD AUTO: 40.8 % (ref 37–48.5)
HDLC SERPL-MCNC: 60 MG/DL (ref 40–75)
HDLC SERPL: 31.9 % (ref 20–50)
HGB BLD-MCNC: 13.8 G/DL (ref 12–16)
IMM GRANULOCYTES # BLD AUTO: 0.01 K/UL (ref 0–0.04)
IMM GRANULOCYTES NFR BLD AUTO: 0.1 % (ref 0–0.5)
LDLC SERPL CALC-MCNC: 109.2 MG/DL (ref 63–159)
LYMPHOCYTES # BLD AUTO: 4.1 K/UL (ref 1–4.8)
LYMPHOCYTES NFR BLD: 49.8 % (ref 18–48)
MCH RBC QN AUTO: 29.6 PG (ref 27–31)
MCHC RBC AUTO-ENTMCNC: 33.8 G/DL (ref 32–36)
MCV RBC AUTO: 88 FL (ref 82–98)
MONOCYTES # BLD AUTO: 0.6 K/UL (ref 0.3–1)
MONOCYTES NFR BLD: 7.1 % (ref 4–15)
NEUTROPHILS # BLD AUTO: 3.3 K/UL (ref 1.8–7.7)
NEUTROPHILS NFR BLD: 40.2 % (ref 38–73)
NONHDLC SERPL-MCNC: 128 MG/DL
NRBC BLD-RTO: 0 /100 WBC
PLATELET # BLD AUTO: 304 K/UL (ref 150–450)
PMV BLD AUTO: 10.7 FL (ref 9.2–12.9)
POTASSIUM SERPL-SCNC: 4.4 MMOL/L (ref 3.5–5.1)
PROT SERPL-MCNC: 7.2 G/DL (ref 6–8.4)
RBC # BLD AUTO: 4.66 M/UL (ref 4–5.4)
SODIUM SERPL-SCNC: 142 MMOL/L (ref 136–145)
TRIGL SERPL-MCNC: 94 MG/DL (ref 30–150)
TSH SERPL DL<=0.005 MIU/L-ACNC: 3.49 UIU/ML (ref 0.4–4)
WBC # BLD AUTO: 8.21 K/UL (ref 3.9–12.7)

## 2023-05-24 PROCEDURE — 80061 LIPID PANEL: CPT | Performed by: INTERNAL MEDICINE

## 2023-05-24 PROCEDURE — 83036 HEMOGLOBIN GLYCOSYLATED A1C: CPT | Performed by: INTERNAL MEDICINE

## 2023-05-24 PROCEDURE — 80053 COMPREHEN METABOLIC PANEL: CPT | Performed by: INTERNAL MEDICINE

## 2023-05-24 PROCEDURE — 36415 COLL VENOUS BLD VENIPUNCTURE: CPT | Mod: PN | Performed by: INTERNAL MEDICINE

## 2023-05-24 PROCEDURE — 85025 COMPLETE CBC W/AUTO DIFF WBC: CPT | Performed by: INTERNAL MEDICINE

## 2023-05-24 PROCEDURE — 84443 ASSAY THYROID STIM HORMONE: CPT | Performed by: INTERNAL MEDICINE

## 2023-06-06 ENCOUNTER — OFFICE VISIT (OUTPATIENT)
Dept: PODIATRY | Facility: CLINIC | Age: 48
End: 2023-06-06
Payer: COMMERCIAL

## 2023-06-06 VITALS — WEIGHT: 283 LBS | BODY MASS INDEX: 52.08 KG/M2 | HEIGHT: 62 IN

## 2023-06-06 DIAGNOSIS — M79.672 LEFT FOOT PAIN: ICD-10-CM

## 2023-06-06 DIAGNOSIS — M72.2 PLANTAR FASCIITIS: Primary | ICD-10-CM

## 2023-06-06 PROCEDURE — 20550 PR INJECT TENDON SHEATH/LIGAMENT: ICD-10-PCS | Mod: LT,S$GLB,, | Performed by: PODIATRIST

## 2023-06-06 PROCEDURE — 99499 UNLISTED E&M SERVICE: CPT | Mod: S$GLB,,, | Performed by: PODIATRIST

## 2023-06-06 PROCEDURE — 99999 PR PBB SHADOW E&M-EST. PATIENT-LVL II: CPT | Mod: PBBFAC,,, | Performed by: PODIATRIST

## 2023-06-06 PROCEDURE — 99999 PR PBB SHADOW E&M-EST. PATIENT-LVL II: ICD-10-PCS | Mod: PBBFAC,,, | Performed by: PODIATRIST

## 2023-06-06 PROCEDURE — 20550 NJX 1 TENDON SHEATH/LIGAMENT: CPT | Mod: LT,S$GLB,, | Performed by: PODIATRIST

## 2023-06-06 PROCEDURE — 99499 NO LOS: ICD-10-PCS | Mod: S$GLB,,, | Performed by: PODIATRIST

## 2023-06-06 RX ORDER — DEXAMETHASONE SODIUM PHOSPHATE 4 MG/ML
2 INJECTION, SOLUTION INTRA-ARTICULAR; INTRALESIONAL; INTRAMUSCULAR; INTRAVENOUS; SOFT TISSUE ONCE
Status: COMPLETED | OUTPATIENT
Start: 2023-06-06 | End: 2023-06-06

## 2023-06-06 RX ORDER — TRIAMCINOLONE ACETONIDE 40 MG/ML
20 INJECTION, SUSPENSION INTRA-ARTICULAR; INTRAMUSCULAR ONCE
Status: COMPLETED | OUTPATIENT
Start: 2023-06-06 | End: 2023-06-06

## 2023-06-06 RX ADMIN — DEXAMETHASONE SODIUM PHOSPHATE 2 MG: 4 INJECTION, SOLUTION INTRA-ARTICULAR; INTRALESIONAL; INTRAMUSCULAR; INTRAVENOUS; SOFT TISSUE at 09:06

## 2023-06-06 RX ADMIN — TRIAMCINOLONE ACETONIDE 20 MG: 40 INJECTION, SUSPENSION INTRA-ARTICULAR; INTRAMUSCULAR at 09:06

## 2023-06-06 NOTE — PROGRESS NOTES
Subjective:      Patient ID: Adama Ortiz is a 47 y.o. female.    Chief Complaint: Foot Problem (Bilateral foot pain ), Foot Pain, and Follow-up    Adama is a 47 y.o. female who presents to the podiatry clinic  with complaint of  bilateral foot pain. Onset of the symptoms was several months ago. Precipitating event: none known. Current symptoms include: ability to bear weight, but with some pain. Aggravating factors: any weight bearing. Symptoms have progressed to a point and plateaued. Patient has had no prior foot problems. Evaluation to date: none. Treatment to date: none. Patients rates pain 5/10 on pain scale. Reports going to PT helped some with pain.     Review of Systems   Constitutional: Negative for chills.   Cardiovascular:  Negative for chest pain and claudication.   Respiratory:  Negative for cough.    Skin:  Positive for color change, dry skin and nail changes.   Musculoskeletal:  Positive for joint pain.   Gastrointestinal:  Negative for nausea.   Neurological:  Positive for paresthesias. Negative for numbness.   Psychiatric/Behavioral:  The patient is not nervous/anxious.          Objective:      Physical Exam  Constitutional:       Appearance: She is well-developed.      Comments: Oriented to time, place, and person.   Cardiovascular:      Comments: DP and PT pulses are palpable bilaterally. 3 sec capillary refill time and toes and feet are warm to touch proximally .  There is  hair growth on the feet and toes b/l. There is no edema b/l. No spider veins or varicosities present b/l.     Musculoskeletal:      Comments: Equinus noted b/l ankles with < 10 deg DF noted. MMT 5/5 in DF/PF/Inv/Ev resistance with no reproduction of pain in any direction. Passive range of motion of ankle and pedal joints is painless b/l.    Pain on palpation plantar medial left heel, no pain with ROM or MMT or medial and lateral compression of heel, - tinel's sign     Feet:      Right foot:      Skin integrity: No callus  or dry skin.      Left foot:      Skin integrity: No callus or dry skin.   Lymphadenopathy:      Comments: Negative lymphadenopathy bilateral popliteal fossa and tarsal tunnel.   Skin:     Comments: No open lesions, lacerations or wounds noted.Interdigital spaces clean, dry and intact b/l. No erythema noted to b/l foot.  Nails normal color and trophic qualities.     Neurological:      Mental Status: She is alert.      Comments: Light touch, proprioception, and sharp/dull sensation are all intact bilaterally. Protective threshold with the [place]-Wienstein monofilament is intact bilaterally.    Psychiatric:         Behavior: Behavior is cooperative.             Assessment:       Encounter Diagnoses   Name Primary?    Plantar fasciitis Yes    Left foot pain            Plan:       Adama was seen today for foot problem, foot pain and follow-up.    Diagnoses and all orders for this visit:    Plantar fasciitis    Left foot pain    Other orders  -     dexAMETHasone injection 2 mg  -     triamcinolone acetonide injection 20 mg        I counseled the patient on her conditions, their implications and medical management.      Discussed different treatment options for heel pain. including conservative and interventional.  I gave written and verbal instructions on heel cord stretching and this was demonstrated for the patient. Patient expressed understanding. Discussed wearing appropriate shoe gear and avoiding flats, slippers, sandals, barefoot, and sockfeet. Recommended arch supports. My recommendation for OTC supports is [employer], [employer]ICS tennis shoes.       Patient instructed on adequate icing techniques. Patient should ice the affected area at least once per day x 10 minutes for 10 days . I advised the  patient that extra icing would also be beneficial to ensure adequate anti inflammatory effect     Stretching handout dispensed to patient. Instructions on adequate stretching reviewed in clinic      - Patient will  stretch the tendo achilles complex three times daily as demonstrated in the office to lengthen heel cord and increase motion at ankle offloading the load on the forefoot and MTPJ..  Literature was dispensed illustrating proper stretching technique.  - Patient will obtain over the counter arch supports and wear them in shoes whenever possible to support the arches and decrease motion at the MTPJ.      Referral placed to PT    Patient would like injection today. Skin was prepped with alcohol and anesthetized with ethyl chloride.  The following mixture was injected into left medial heel approach: 3ccs of mixture of (1cc 1% plain Lidocaine : 1cc 0.5% Marcaine plain:  0.5cc kenalog-40 : 0.5cc dexamethasone) directly into problematic areas.  Patient  tolerated the injection well. Related nearly 100% relief following injection.     RTC PRN

## 2023-06-23 ENCOUNTER — HOSPITAL ENCOUNTER (OUTPATIENT)
Dept: RADIOLOGY | Facility: HOSPITAL | Age: 48
Discharge: HOME | End: 2023-06-23
Attending: INTERNAL MEDICINE
Payer: COMMERCIAL

## 2023-06-23 DIAGNOSIS — Z12.31 ENCOUNTER FOR SCREENING MAMMOGRAM FOR BREAST CANCER: ICD-10-CM

## 2023-06-23 PROCEDURE — 77067 SCR MAMMO BI INCL CAD: CPT | Mod: 26,,, | Performed by: RADIOLOGY

## 2023-06-23 PROCEDURE — 77067 MAMMO DIGITAL SCREENING BILAT WITH TOMO: ICD-10-PCS | Mod: 26,,, | Performed by: RADIOLOGY

## 2023-06-23 PROCEDURE — 77063 BREAST TOMOSYNTHESIS BI: CPT | Mod: 26,,, | Performed by: RADIOLOGY

## 2023-06-23 PROCEDURE — 77063 MAMMO DIGITAL SCREENING BILAT WITH TOMO: ICD-10-PCS | Mod: 26,,, | Performed by: RADIOLOGY

## 2023-06-23 PROCEDURE — 77067 SCR MAMMO BI INCL CAD: CPT | Mod: TC,PO

## 2023-08-22 ENCOUNTER — HOSPITAL ENCOUNTER (OUTPATIENT)
Dept: RADIOLOGY | Facility: HOSPITAL | Age: 48
Discharge: HOME | End: 2023-08-22
Attending: INTERNAL MEDICINE
Payer: COMMERCIAL

## 2023-08-22 DIAGNOSIS — M25.561 ACUTE PAIN OF RIGHT KNEE: ICD-10-CM

## 2023-08-22 PROCEDURE — 73564 X-RAY EXAM KNEE 4 OR MORE: CPT | Mod: 26,RT,, | Performed by: RADIOLOGY

## 2023-08-22 PROCEDURE — 73564 X-RAY EXAM KNEE 4 OR MORE: CPT | Mod: TC,FY,RT

## 2023-08-22 PROCEDURE — 73564 XR KNEE COMP 4 OR MORE VIEWS RIGHT: ICD-10-PCS | Mod: 26,RT,, | Performed by: RADIOLOGY

## 2023-09-06 DIAGNOSIS — I10 PRIMARY HYPERTENSION: Primary | ICD-10-CM

## 2023-09-06 RX ORDER — LOSARTAN POTASSIUM 25 MG/1
25 TABLET ORAL DAILY
Qty: 30 TABLET | Refills: 0 | Status: SHIPPED | OUTPATIENT
Start: 2023-09-06 | End: 2023-09-20 | Stop reason: SDUPTHER

## 2023-09-20 ENCOUNTER — PATIENT MESSAGE (OUTPATIENT)
Dept: ADMINISTRATIVE | Facility: HOSPITAL | Age: 48
End: 2023-09-20
Payer: COMMERCIAL

## 2023-10-30 ENCOUNTER — PATIENT MESSAGE (OUTPATIENT)
Dept: ADMINISTRATIVE | Facility: OTHER | Age: 48
End: 2023-10-30
Payer: COMMERCIAL

## 2023-11-13 ENCOUNTER — OFFICE VISIT (OUTPATIENT)
Dept: FAMILY MEDICINE | Facility: CLINIC | Age: 48
End: 2023-11-13
Payer: COMMERCIAL

## 2023-11-13 VITALS
DIASTOLIC BLOOD PRESSURE: 86 MMHG | TEMPERATURE: 98 F | SYSTOLIC BLOOD PRESSURE: 132 MMHG | HEIGHT: 62 IN | WEIGHT: 284.81 LBS | RESPIRATION RATE: 16 BRPM | OXYGEN SATURATION: 98 % | BODY MASS INDEX: 52.41 KG/M2 | HEART RATE: 86 BPM

## 2023-11-13 DIAGNOSIS — I10 PRIMARY HYPERTENSION: ICD-10-CM

## 2023-11-13 DIAGNOSIS — M17.11 PATELLOFEMORAL ARTHRITIS OF RIGHT KNEE: Primary | ICD-10-CM

## 2023-11-13 DIAGNOSIS — G57.01 PIRIFORMIS SYNDROME OF RIGHT SIDE: ICD-10-CM

## 2023-11-13 PROBLEM — M75.82 ROTATOR CUFF TENDINITIS, LEFT: Status: RESOLVED | Noted: 2022-09-26 | Resolved: 2023-11-13

## 2023-11-13 PROBLEM — M79.672 BILATERAL FOOT PAIN: Status: RESOLVED | Noted: 2023-02-13 | Resolved: 2023-11-13

## 2023-11-13 PROBLEM — M72.2 PLANTAR FASCIITIS OF LEFT FOOT: Status: RESOLVED | Noted: 2023-02-13 | Resolved: 2023-11-13

## 2023-11-13 PROBLEM — M62.81 MUSCLE WEAKNESS: Status: RESOLVED | Noted: 2023-02-13 | Resolved: 2023-11-13

## 2023-11-13 PROBLEM — M76.62 LEFT ACHILLES TENDINITIS: Status: RESOLVED | Noted: 2023-02-13 | Resolved: 2023-11-13

## 2023-11-13 PROBLEM — M79.671 BILATERAL FOOT PAIN: Status: RESOLVED | Noted: 2023-02-13 | Resolved: 2023-11-13

## 2023-11-13 PROCEDURE — 1159F PR MEDICATION LIST DOCUMENTED IN MEDICAL RECORD: ICD-10-PCS | Mod: CPTII,S$GLB,, | Performed by: NURSE PRACTITIONER

## 2023-11-13 PROCEDURE — 4010F ACE/ARB THERAPY RXD/TAKEN: CPT | Mod: CPTII,S$GLB,, | Performed by: NURSE PRACTITIONER

## 2023-11-13 PROCEDURE — 3008F PR BODY MASS INDEX (BMI) DOCUMENTED: ICD-10-PCS | Mod: CPTII,S$GLB,, | Performed by: NURSE PRACTITIONER

## 2023-11-13 PROCEDURE — 3075F PR MOST RECENT SYSTOLIC BLOOD PRESS GE 130-139MM HG: ICD-10-PCS | Mod: CPTII,S$GLB,, | Performed by: NURSE PRACTITIONER

## 2023-11-13 PROCEDURE — 99214 PR OFFICE/OUTPT VISIT, EST, LEVL IV, 30-39 MIN: ICD-10-PCS | Mod: S$GLB,,, | Performed by: NURSE PRACTITIONER

## 2023-11-13 PROCEDURE — 3044F PR MOST RECENT HEMOGLOBIN A1C LEVEL <7.0%: ICD-10-PCS | Mod: CPTII,S$GLB,, | Performed by: NURSE PRACTITIONER

## 2023-11-13 PROCEDURE — 1160F PR REVIEW ALL MEDS BY PRESCRIBER/CLIN PHARMACIST DOCUMENTED: ICD-10-PCS | Mod: CPTII,S$GLB,, | Performed by: NURSE PRACTITIONER

## 2023-11-13 PROCEDURE — 3075F SYST BP GE 130 - 139MM HG: CPT | Mod: CPTII,S$GLB,, | Performed by: NURSE PRACTITIONER

## 2023-11-13 PROCEDURE — 3008F BODY MASS INDEX DOCD: CPT | Mod: CPTII,S$GLB,, | Performed by: NURSE PRACTITIONER

## 2023-11-13 PROCEDURE — 4010F PR ACE/ARB THEARPY RXD/TAKEN: ICD-10-PCS | Mod: CPTII,S$GLB,, | Performed by: NURSE PRACTITIONER

## 2023-11-13 PROCEDURE — 1159F MED LIST DOCD IN RCRD: CPT | Mod: CPTII,S$GLB,, | Performed by: NURSE PRACTITIONER

## 2023-11-13 PROCEDURE — 3079F PR MOST RECENT DIASTOLIC BLOOD PRESSURE 80-89 MM HG: ICD-10-PCS | Mod: CPTII,S$GLB,, | Performed by: NURSE PRACTITIONER

## 2023-11-13 PROCEDURE — 99999 PR PBB SHADOW E&M-EST. PATIENT-LVL IV: ICD-10-PCS | Mod: PBBFAC,,, | Performed by: NURSE PRACTITIONER

## 2023-11-13 PROCEDURE — 1160F RVW MEDS BY RX/DR IN RCRD: CPT | Mod: CPTII,S$GLB,, | Performed by: NURSE PRACTITIONER

## 2023-11-13 PROCEDURE — 99999 PR PBB SHADOW E&M-EST. PATIENT-LVL IV: CPT | Mod: PBBFAC,,, | Performed by: NURSE PRACTITIONER

## 2023-11-13 PROCEDURE — 99214 OFFICE O/P EST MOD 30 MIN: CPT | Mod: S$GLB,,, | Performed by: NURSE PRACTITIONER

## 2023-11-13 PROCEDURE — 3079F DIAST BP 80-89 MM HG: CPT | Mod: CPTII,S$GLB,, | Performed by: NURSE PRACTITIONER

## 2023-11-13 PROCEDURE — 3044F HG A1C LEVEL LT 7.0%: CPT | Mod: CPTII,S$GLB,, | Performed by: NURSE PRACTITIONER

## 2023-11-14 ENCOUNTER — PATIENT MESSAGE (OUTPATIENT)
Dept: ADMINISTRATIVE | Facility: HOSPITAL | Age: 48
End: 2023-11-14
Payer: COMMERCIAL

## 2023-11-14 ENCOUNTER — PATIENT MESSAGE (OUTPATIENT)
Dept: ADMINISTRATIVE | Facility: OTHER | Age: 48
End: 2023-11-14
Payer: COMMERCIAL

## 2023-11-14 ENCOUNTER — TELEPHONE (OUTPATIENT)
Dept: ADMINISTRATIVE | Facility: HOSPITAL | Age: 48
End: 2023-11-14
Payer: COMMERCIAL

## 2023-11-20 NOTE — PROGRESS NOTES
"Subjective:      Patient ID: Adama Ortiz is a 48 y.o. female.  New to me but seen previously in clinic by a fellow provider. Pt presents to clinic with recurrent right knee and leg pain that began after fall a couple of months ago. 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 Denies new trauma, injury or fall.       Review of Systems   Constitutional:  Negative for activity change, appetite change, fatigue, fever and unexpected weight change.   HENT:  Negative for hearing loss, rhinorrhea and trouble swallowing.    Eyes:  Negative for discharge and visual disturbance.   Respiratory:  Negative for chest tightness, shortness of breath and wheezing.    Cardiovascular:  Negative for chest pain, palpitations, leg swelling and claudication.   Gastrointestinal:  Negative for abdominal pain, blood in stool, constipation, diarrhea, nausea and vomiting.   Endocrine: Negative for polydipsia and polyuria.   Genitourinary:  Negative for difficulty urinating, dysuria, hematuria and menstrual problem.   Musculoskeletal:  Positive for arthralgias, gait problem and leg pain. Negative for back pain, joint swelling, myalgias, neck pain, neck stiffness and joint deformity.   Integumentary:  Negative for rash.   Neurological:  Negative for weakness and headaches.   Psychiatric/Behavioral:  Negative for confusion and dysphoric mood.    All other systems reviewed and are negative.        Objective:     Vitals:    11/13/23 1136   BP: 132/86   Pulse: 86   Resp: 16   Temp: 98.1 °F (36.7 °C)   TempSrc: Oral   SpO2: 98%   Weight: 129.2 kg (284 lb 13.4 oz)   Height: 5' 2" (1.575 m)     Physical Exam  Vitals and nursing note reviewed.   Constitutional:       General: She is not in acute distress.     Appearance: Normal appearance. She is well-developed and well-groomed. She is obese. She is not ill-appearing.   HENT:      Head: Normocephalic and atraumatic.      Right Ear: External ear normal.      Left Ear: External ear normal. "      Nose: Nose normal.      Mouth/Throat:      Lips: Pink.      Mouth: Mucous membranes are moist.   Eyes:      General: Lids are normal. Vision grossly intact. Gaze aligned appropriately.      Conjunctiva/sclera: Conjunctivae normal.      Pupils: Pupils are equal, round, and reactive to light.   Neck:      Trachea: Phonation normal.   Cardiovascular:      Rate and Rhythm: Normal rate and regular rhythm.      Heart sounds: Normal heart sounds.   Pulmonary:      Effort: Pulmonary effort is normal. No accessory muscle usage or respiratory distress.      Breath sounds: Normal breath sounds and air entry.   Abdominal:      General: Abdomen is flat. Bowel sounds are normal. There is no distension.      Palpations: Abdomen is soft.      Tenderness: There is no abdominal tenderness.   Musculoskeletal:      Cervical back: Neck supple.      Lumbar back: No swelling, deformity, spasms, tenderness or bony tenderness. Normal range of motion. Negative right straight leg raise test and negative left straight leg raise test.      Right hip: Crepitus present. No deformity or bony tenderness. Normal range of motion. Decreased strength.      Left hip: Normal.      Right upper leg: Normal.      Left upper leg: Normal.      Right knee: Crepitus present. No swelling, deformity, erythema or bony tenderness. Decreased range of motion. Tenderness present over the lateral joint line. Abnormal patellar mobility (laxity, quad atrophy).      Left knee: Normal.      Right lower leg: Normal. No edema.      Left lower leg: Normal. No edema.      Right ankle: Normal.      Left ankle: Normal.        Legs:    Feet:      Right foot:      Skin integrity: Skin integrity normal.      Toenail Condition: Right toenails are normal.      Left foot:      Skin integrity: Skin integrity normal.      Toenail Condition: Left toenails are normal.   Skin:     General: Skin is warm and dry.      Findings: No rash.   Neurological:      General: No focal deficit  present.      Mental Status: She is alert and oriented to person, place, and time. Mental status is at baseline.      Sensory: Sensation is intact.      Motor: Motor function is intact.      Coordination: Coordination is intact.      Gait: Gait abnormal (antalgic limp).   Psychiatric:         Attention and Perception: Attention and perception normal.         Mood and Affect: Mood and affect normal.         Speech: Speech normal.         Behavior: Behavior normal. Behavior is cooperative.         Thought Content: Thought content normal.         Cognition and Memory: Cognition and memory normal.         Judgment: Judgment normal.       EXAMINATION: XR KNEE COMP 4 OR MORE VIEWS RIGHT  No acute fractures.  No definite narrowing of medial or lateral tibiofemoral or patellofemoral articulations.  Minimal spurring posterior patella.  No definite suprapatellar bursal effusion or prepatellar soft tissue swelling.  Assessment and Plan:     1. Patellofemoral arthritis of right knee  Natural history and expected course discussed. Questions answered.  Educational materials distributed.  Patellar compression sleeve.  Quad strengthening exercises.  OTC analgesics as needed.  PT referral.  Follow up if fails to improve or worsen.    2. Piriformis syndrome of right side  Natural history and expected course discussed. Questions answered.  Home exercise plan outlined.  OTC analgesics as needed.  PT referral.  Consider ortho f/u if fails to improve or worsen     3. Primary hypertension  Continue current treatment regimen.  Dietary sodium restriction.  Regular aerobic exercise.  Weight loss.  Patient Education: Reviewed risks of hypertension and principles of treatment.  - Ambulatory referral/consult to Nutrition Services; Future  - Ambulatory referral/consult to [facility] (zanda); Future  - [facility] ASSIGN QUESTIONNAIRE SERIES (zanda)  - [facility] Patient Entered [facility] (zanda)    4. BMI 50.0-59.9, adult  The patient  is asked to make an attempt to improve diet and exercise patterns to aid in medical management of this problem.  Discussed proper diet (low fat, low sodium, low sugar, high fiber) with patient.    Discussed need for regular exercise (3 times per week, 20 minutes per session)   - Ambulatory referral/consult to Nutrition Services; Future  - Ambulatory referral/consult to [facility] (MED[facility]); Future  - [facility] ASSIGN QUESTIONNAIRE SERIES (MEDFIT)  - [facility]           NICK Beach, FNP-C  Family/Internal Medicine  [facility]

## 2023-11-22 ENCOUNTER — PATIENT OUTREACH (OUTPATIENT)
Dept: ADMINISTRATIVE | Facility: HOSPITAL | Age: 48
End: 2023-11-22
Payer: COMMERCIAL

## 2023-11-22 DIAGNOSIS — Z12.11 COLON CANCER SCREENING: Primary | ICD-10-CM

## 2023-11-22 NOTE — PROGRESS NOTES
Health Maintenance Due   Topic Date Due    Influenza Vaccine (1) 09/01/2023    COVID-19 Vaccine (4 - 2023-24 season) 09/01/2023    Colorectal Cancer Screening  10/11/2023   Chart review done. HM updated. Immunizations reviewed & updated. Care Everywhere updated.  [employer] was given to patient on 11/22/2023 12:12 PM (MAILED)

## 2023-11-27 ENCOUNTER — NUTRITION (OUTPATIENT)
Dept: NUTRITION | Facility: CLINIC | Age: 48
End: 2023-11-27
Payer: COMMERCIAL

## 2023-11-27 VITALS — HEIGHT: 62 IN | WEIGHT: 282 LBS | BODY MASS INDEX: 51.89 KG/M2

## 2023-11-27 DIAGNOSIS — I10 PRIMARY HYPERTENSION: ICD-10-CM

## 2023-11-27 PROCEDURE — 99999 PR PBB SHADOW E&M-EST. PATIENT-LVL II: ICD-10-PCS | Mod: PBBFAC,,,

## 2023-11-27 PROCEDURE — 99999 PR PBB SHADOW E&M-EST. PATIENT-LVL II: CPT | Mod: PBBFAC,,,

## 2023-11-27 PROCEDURE — 97802 PR MED NUTR THER, 1ST, INDIV, EA 15 MIN: ICD-10-PCS | Mod: S$GLB,,,

## 2023-11-27 PROCEDURE — 97802 MEDICAL NUTRITION INDIV IN: CPT | Mod: S$GLB,,,

## 2023-11-27 NOTE — PROGRESS NOTES
"Nutrition Assessment    Visit Type: Insurance initial  Session Time:  2 Hours  Reason for MNT visit: Pt in for education and nutrition counseling regarding Prediabetes, HTN, and Obesity.       Age: 48 y.o.  Wt:   Wt Readings from Last 1 Encounters:   11/27/23 127.9 kg (282 lb)     Ht:   Ht Readings from Last 1 Encounters:   11/27/23 5' 2" (1.575 m)     BMI:   BMI Readings from Last 1 Encounters:   11/27/23 51.58 kg/m²       Client states:  She is here because she would like to start taking more care of herself. Adama states she wants to learn how to eat better to promote her overall health. She would like to lose weight in a sustainable way. She reports she was on the [employer] diet 2 years ago and lost 28 lbs in 2 months - she knew it was not sustainable - ended up gaining 50 lbs back. Adama also reports potential symptoms of early menopause including hot flashes and extreme appetite fluctuations. 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 States she has had some recent injuries that have prevented her from attending exercise classes, but she hopes to eventually get back into those.     Medical History  Problem List             Resolved    Prediabetes         Hyperprolactinemia         Primary hypertension         Autonomic dysfunction         Female infertility         BMI 50.0-59.9, adult         Major depressive disorder, single episode, mild         Chronic left-sided low back pain without sciatica            Past Medical History:   Diagnosis Date    Hypertension        Past Surgical History:   Procedure Laterality Date    APPENDECTOMY      BREAST SURGERY      DILATION AND CURETTAGE OF UTERUS      TOTAL REDUCTION MAMMOPLASTY Bilateral 2006          Medications    Prior to Admission medications    Medication Sig Start Date End Date Taking? Authorizing Provider   amLODIPine (NORVASC) 10 MG tablet Take 1 tablet (10 mg total) by mouth every evening. 1/27/23 1/27/24  Trudi Sanchez MD   gabapentin " (NEURONTIN) 300 MG capsule Take 1 capsule (300 mg total) by mouth every evening.  Patient not taking: Reported on 11/13/2023 8/22/23 8/21/24  Trudi Sanchez MD   losartan (COZAAR) 25 MG tablet Take 1 tablet (25 mg total) by mouth once daily. 9/20/23 9/19/24  Trudi Sanchez MD        Vitamins, Minerals, and/or Supplements:  None reported     Food Allergies or Intolerances:  NKFAI     Social History    Marital status:      Social History     Tobacco Use    Smoking status: Never    Smokeless tobacco: Never   Substance Use Topics    Alcohol use: Yes     Comment: rarely     Current Alcohol use: montly or less, 1-2 drinks    Lab Reports   Reviewed and noted    Lab Results   Component Value Date    TSH 3.493 05/24/2023    AST 28 05/24/2023    ALT 32 05/24/2023    HDL 60 05/24/2023    LDLCALC 109.2 05/24/2023    TRIG 94 05/24/2023    HGBA1C 5.8 (H) 05/24/2023    HGBA1C 5.3 12/14/2021    HGBA1C 5.4 10/07/2020         BP Readings from Last 1 Encounters:   11/13/23 132/86        24-hour Recall    Reviewed and noted during visit    Food choices (After Midnight)  Patient eating midnight to 4am: (P) Never                  Food choices (Early Morning)  Patient eats 4am to 8am: (P) Never                  Food choices (Morning)  Patient eats 8am to noon: (P) Once or twice a week   Home cooked meals (8am - noon): (P) Eggs, sausage, grits, yogurt, fruit   Fast food meals (8am - midnight): (P) Donuts,   Snacks (8am - noon): (P) Chips, nuts, kind bars     Food choices (Afternoon)  Patient eats noon to 4pm: (P) Every day   Home cooked meals (Noon - 4pm): (P) Pasta, beans, salmon, chicken, rice, veggies, salad, fruit   Snacks (Noon to 4pm): (P) Chips, nuts, kind bars     Food choices (Evening)   Patient eats 4pm to 8pm: (P) Every day   Home cooked meals (4pm - 8pm): (P) Rice, beans, chicken, salmon, salad, veggies   Fast food meals (4pm - 8pm): (P) Pizza, burgers, fried chicken, sandwiches   Snacks (4pm - 8pm): (P) Ice cream,  cookies, popcorn     Food choices (Late evening)  R [facility] PEQ NUTRITION HOW OFTEN EATING LATE EVENING: (P) Once or twice a week                  Beverages  How much water consumed (per day?): (P) 3   Cups of milk consumed (per day?): (P) 0       Cups of  juice consumed (per day?): (P) 0   Number of supplement shakes (per day?): (P) 0       Number of cups of coffee (per day?): (P) 0           Cups of soda consumed (per day?): (P) 0   Other non-alcoholic beverages consumed (per day?): (P) 0          LIFESTYLE FACTORS    Dinning out: 3-4 x per week, mostly restaurants, mostly take out, mostly delivery    Meal preparation/shopping: Who does the grocery shopping:: (P) I do Who prepares the meals: (P) I do     Sleep: poor - pt reports she messaged MD regarding potential sleep apnea    Stress Level: high    Support System:  spouse    Exercise Regimen: Sedentary (little or no exercise)      Diagnosis    Food and nutrition related knowledge deficit related to lack of prior nutrition education as evidenced by patient report.      Intervention    Estimated Energy Requirements:   Calories: 0172-7678  Protein: 145-170 grams  Carbohydrates: 170 grams  Total Fat: 65-75 grams  Baseline for fluids: 140 fl ounces    Recommendations & Goals:  Patient goals and recommendations are tailored to the specific patient's needs, readiness to change, lifestyle, culture, skills, resources, & abilities. Strategies to help achieve these nutrition-related goals were discussed which can include but are not limited to SMART goal setting & mindful eating.     Aim for a minimum of 7 hours sleep   Exercise 60 minutes most days  Eat breakfast within 1-2 hours of waking up  Try not to skip any meals or snacks, not going more than 3-4 hours without eating   At each meal and snack, try to include a source of fiber + lean protein + healthy fat     Written Materials Provided  These resources are intended to assist the patient in making it easier to choose  recommended options when eating out & to identify better-for-you brands at the grocery store:    Meal Planning Guide with recommendations discussed along with portion sizes and a customized meal plan   Fueling Well On-the-Go Food Guide  Eat Fit Shopping Guide  Lifestyle Nutrition Meal Guide  RD contact information    Goals:  1.  Eat every 3-4 hours.   2.  Incorporate a source of lean protein, fiber, and healthy fat with each meal and snack.   3.  Drink at least 64 fl ounces of water each day. Tip: get 32 fl ounce bottle so that you only have to fill up and drink twice each day.       Monitoring/Evaluation    Monitor the following:  Weight  Sleep  Stress Management  Movement  Nutrient intake in reference to meal plan    Communicated with healthcare provider and documented plan for referral to appropriate agency/healthcare provider as needed    Supervising Physician: Oren Blount MD    Patient motivation, anticipated barriers, expected compliance: Patient is motivated and has verbalized understanding and intent to comply.     Comprehension: good     Follow-up: 4 weeks

## 2023-11-27 NOTE — PATIENT INSTRUCTIONS
Name: Adama Ortiz    Date: 11/27/2023    Nutrition protocol      Daily Energy Requirements:  Calories: 7667-7343  Protein: 145-170 g   Carbohydrates: 170 g   Focus on Heart Healthy Fats  Fluid: 141 fl ounces + sweat loss  Limit Added Sugar to no more than 20 g      Wake: 5:30-6:00 AM    Breakfast: 8:00-9:00 AM   2-3 servings of Carbohydrates (30-45 grams) + at least 25 grams lean protein/heart healthy fats   Breakfast option 1: Overnight Oats   Can be made hot or cold  ½ cup Old Fashioned Oats, uncooked (will be ~1 cup once liquid is absorbed)  1 Tbsp. jose seeds/ground flax seeds  1 scoop of Protein Powder (Olayinkar is a great brand!)  Unsweetened almond milk (enough to cover the oats to absorb the liquid)  1 Tbsp. nut butter -OR- 1 Tbsp. nuts  ½ cup berries or sliced fruit of choice  Optional: vanilla extract and/or cinnamon for flavor  Breakfast option 2:   2 eggs  2 slices center cut johnson -OR- 1 turkey/chicken sausage trace   2 slices 100% whole wheat toast (see brand recommendations) -OR- 1 cup grits (can add up to 1 tsp. butter)  Breakfast option 3: Frozen Breakfast Sandwiches  Bowls for On-The-Go:   Choose 1:  Deni Wisdom' (English muffin)   Deni Wisdom' Egg'Wich (the breadless breakfast)   Deni Stafford Egg Bites   GoodFood [egg white trace breakfast sandwich]   EVOL: Morning bowls  Lean and fit options   Breakfast option 4: Greek Yogurt Bowl  1 ¼ cup berries or 1 serving of fruit, sliced  ¾-1 cup of plain or flavored non-fat Greek yogurt (see brands at end of document)  ¼ cup granola (KIND granola we looked at is fine)  1 Tbsp jose seeds  Breakfast option 5: Peanut Butter and Banana Toast  1 slice 100% whole wheat bread (see brand recommendations)  2 Tbsp. peanut butter   ½ banana, sliced  1 Tbsp. jose seeds  Breakfast option 6: Egg Muffins + Greek yogurt + fruit  3 egg muffins  1 serving of plain or flavored Greek Yogurt (see brand recommendations)  2 servings of  fruit  Breakfast option 7: Protein shake + Fruit + Nuts  1 Ready-to-drink Protein shake (see brands at end of document)  1-2 serving(s)of fruit  1 oz. nuts of choice -OR- 1 Tbsp. nut butter of choice  Breakfast option 8: Fast Food/Fast Casual  Refer to Fueling Well On-the-go Guide for better-for-you options      Snack: 11:30 AM  1-2 serving of Carbohydrates (15-30 g) + at least 20 grams lean protein/heart healthy fats   Select 1 from the list--All separate options  1 serving pistachios (25-30 nuts) + 1 serving of fruit    1 slice 100% whole wheat toast + 1 Tbsp of peanut butter  ½ cup Hummus + 1 cup mixed raw vegetables for dipping  1 cup bell peppers OR 1 serving beanitos + ½ cup guacamole   1 serving Wheat Thins + 1 reduced-fat string cheese   1-2 Tbsp Peanut butter + 1 Apple  Protein bar (Oatmega, Nature Valley Protein Bar, Kind Protein Bar, No Cow Bar)  1/2 cup Greek yogurt (see brand recommendations) + 1/2-1 cup berries+ ¼ cup granola   Protein shake made with 1 scoop protein powder, unsweetened almond milk, 1 cup frozen fruit, ½ cup spinach/kale, 1 teaspoon peanut butter, 1 teaspoon jose seeds  2 Reduced-fat string cheese + 1 cup grapes   ½ cup Cottage cheese + 1 serving fruit  ½ -1 cup Edamame  1 Hardboiled egg + 1 serving fruit      Lunch: 2:00-2:30 PM  5 ounces Protein + 2-3 servings (30-45 g) of Carbohydrates + heart healthy fats + Unlimited Non-Starchy Vegetables   Lunch option 1: Salad  5 ounces of lean protein (chicken, salmon, turkey, etc)  2 cups dark leafy green mixture (spring mix, kale, arugula, mixed with iván lettuce)  ½ cup of starch/grain (cubed sweet potatoes, brown rice, etc)  ½ cup fruit (blueberries, strawberries, grapes etc)  Unlimited non-starchy vegetables  1 ounce dressing (lightly coat the lettuce)  Lunch option 2: Guide for Leftovers/Meal Prep/Cafeteria Meals  5 oz. of lean protein  At least ½ cup cooked non-starchy vegetables (sautéed with olive oil)  1 cup whole grain starch  (chosen from Meal Planning Guidebook)  Lunch option 3: [place]   [place]   4 oz. of lean meat (chosen from meal planning guide--ex. Grilled chicken, turkey breast, chicken/tuna salad)   1 oz. cheese  2 slices of 100% whole wheat bread (see options below)  Dressed with lettuce, sliced tomato, pickles, 1 Tbsp [place]ez + Mustard (as desired)  1 serving of fruit -OR- 1 serving 100% whole grain chips (see brand recommendations)  Lunch option 4:  Chicken Salad + crackers + non-starchy veg + fruit  ½ cup chicken salad (lightly coated with mayonnaise) - ElmoFoxyP2y Triporati has a good one  1 serving 100% whole wheat or seed crackers (see brand recommendations)  1 cup non-starchy vegetables (bell peppers, cucumbers, etc.)  1 serving of fruit   Lunch option 5: Sushi  Get 1 roll with rice and 1 roll without  Focus on protein by getting a roll with a good amount of fish in it  Lunch option 6: Fast Food/Fast Casual  Refer to Fueling Well On The Go Guide for better-for-you options       Afternoon Snack: 5:30 PM - try to only include this snack if you are eating dinner closer to 7:30 that evening  1-2 serving of Carbohydrates (15-30 g) + at least 20 grams lean protein/heart healthy fats   Select 1 from the list below Morning Snack      Dinner: 6:30-7:30 PM   5 ounces of Protein + Unlimited Non-Starchy Vegetables + 1-2 Servings of Carbohydrates (15-30 g) + heart healthy fats  Dinner option 1:  5 ounces of lean protein (ex. pork loin, fillet, sirloin, pork chops, chicken without skin)  1 cup or more cooked non-starchy vegetables  1 serving of starch or starchy vegetable from meal planning guidebook  Dinner option 2: 1 serving Palmini Lasagna (yield 10 servings)  2 lbs lean ground beef  4 cups lower-sugar marinara sauce (see brand recommendations)  1 package Palmini Lasagna Sheets (8 oz)  2½ cups 2% mozzarella cheese  1 pint low-fat ricotta cheese or 2% cottage cheese (small curd)  Dinner option 3: Tacos  4 oz. lean protein (chosen  from Meal Planning Guide)  Baked, broiled, grilled, poached, air-fried   2 Florence Carb Balance 6-inch tortillas  1 oz. reduced-fat cheese  2 Tbsp. guacamole  Salsa to taste  Top with lettuce, tomatoes, onion  Dinner option 4:  Here are a few blogs that typically have balanced recipes, still vet out the ingredients   Fit Foodie Finds  SkinnyTaste  NutritionStripped  Downshiftology  Minimalist Hwang  Gricel's Savory [facility]  A Mind Full Mom  Paleo Running Momma  Dinner option 5:  Any lunch option can be a dinner option, just decrease carbs eaten  Dinner option 6:  Out to eat, refer to the restaurant tips below, choose Eat Fit partnered restaurants or refer to the Fueling Well on the Go guide to choose better-for- you options    Carb swaps if desired:  Palmini - Hearts of palm-based 'linguini' or lasagna noodles  'Riced' Cauliflower  'Riced' Broccoli  Cauliflower mash   Spaghetti squash  Zoodles  Spiralized Beets   Low Carb Breads: (freezer aisle)  'Base Culture' 7 Nut + Seed and Original Paleo Bread  Carbonaut: Seeded low carb keto bread  Unbuns: Low carb hamburger bun   Outer Aisle Plantpower à makes pre-made frozen cauliflower pizza crust & wraps as well as frozen sandwich thins  Great for making low carb pizzas, burgers and sandwiches  Valdemar'flour frozen flatbreads and pizza crustsà any flavor   Great for making low carb pizzas, burgers, and sandwiches    Think outside the box for low carb dishes:  Kabobs, stir solorzano with riced cauliflower or riced broccoli, stuffed bell peppers with no rice, lettuce wraps, eggplant lasagna, shrimp etouffee made with riced cauliflower, request a sushi roll that contains raw fish to be made without rice, etc    **If craving something sweet, as a sometimes treat, try one of these options:  ~150 calories:  ¾ cup (frozen) mixed berries with 1-2 Tbsp coconut whip cream   1-2 pieces Fun Size candy   1 Yasso Greek yogurt bar  Peanut butter whip--light whip cream, PB2 powder,  collagen/ protein powder, SF pudding packet  ½ cup plain Greek yogurt mix in 1 tbsp SF pudding mix--freeze 1+ hour(s)   Protein shake, put in freezer for 30 minutes to get ice cold  Dole dippers                              Restaurant Tips   Pick 1 out of the 4 Rule: Instead of eating bread/tortilla chips, an appetizer, alcoholic drink and dessert, choose just one to have with your entrée   Focus on lean proteins: Refer to lean meat/meat substitutes page in meal planning guidebook. Select items grilled, baked, broiled, braised, poached or roasted       For your heart health, avoid crispy, crunchy, breaded, paneed or stuffed items and items that are cream based, au gratin or buttered       Order sauces, dressings, and gravies on the side. This way you can add 1-2 tablespoons yourself. This helps with portion control      Request extra non-starchy vegetables instead of a starchy side dish. If the starchy side is something you love, consider splitting it with someone else at the table      Beverages: Order water with lemon, sparkling water, or unsweetened tea. Avoid sugary soft drinks, juices and mixers   Deep fried foods: Enjoy no more than 2x/month             Dining Out      [facility]   Designed to take the guesswork out of dining out healthfully, [facility] makes the healthy choice the easy choice   Visit [email or web address]    Order the Univa UD Cookbook to create restaurant quality dishes at home   Download the [facility] Eat [facility] monique for free on your smartphone   All Eat Fit restaurants & dishes by location    Nutrition facts for every Eat Fit dish   200 + Eat Fit approved recipes   Grocery shopping guides + community wellness resources            Building A Better Smoothie  Choose your protein. Pick 1 from the followin oz 2% Plain Greek yogurt   5 oz 2% Cottage cheese   Plant-based protein powder   Rj Wellington   Garden of Life RAW   100% whey protein powder   2-3 scoops  Collagen Peptides (Vital Proteins is a brand favorite)   Make it sweet:   Add ~1-1.5 cups fresh -or- No Sugar Added frozen fruit    Add your veggies:   Instead of ice, choose frozen cauliflower florets    Cauliflower helps with the detoxification process in our bodies, and it's virtually tasteless!   Greens: spinach, kale, or other leafy greens   Beets are a great addition to smoothies, especially paired with strawberries and lemon   Cucumbers and celery are refreshing ways to enhance nutrition and hydration within your smoothie   Add one of the following plant-based fats:    Nut butter: 1 teaspoon - 1 tablespoon   Natural peanut butter   Homestead butter   Cashew butter   Nuts: ~2-3 tablespoons    Avocado (¼ - ½ Quintero)   Avocado oil  Extra Virgin Olive Oil: 1 teaspoon - 1 tablespoon   Add a liquid to reach your desired consistency:   Unsweetened/No Sugar Added plant-based milk alternative    Homestead, Hemp, Cashew, Coconut, Rice or Soy    Milk: 1% or 2%   Healthy add-ins for an extra nutritional boost:   1 tablespoon flaxseeds -or- jose seeds            Ordering A Better-For-You Salad   Include a lean protein, any amount of non-starchy vegetables, and small amount of plant-based fats   Order dressings on the side to better control portion sizes   Add ~2-3 tablespoons yourself to lightly coat   Pick 2-3 salad add-ins, each being ~2 tablespoons:   Dressing   Cheese   Nuts   Avocado/Guacamole         Additional Nutrition Tips      -Exercise  Movement: Aim for at least 45-60 minutes of moderate exercise 5 days a week.    If you're getting back in the routine of exercising, then start off with small goals   Even a 15-minute daily walk adds up    Be consistent and increase the length of time you exercise gradually until you're able to do at least 45 minutes most days of the week   Be sure to consume at least 20 grams protein within 1-hour post weight bearing exercise/high intensity workouts      -Grocery Aisle Food Brand  Guidelines: Kike #7 rule   Look for products that have 7 grams or less added sugar, and at least 7 grams or more protein      -Frozen Meal Guidelines:    ~45 grams or less carbohydrates & at least 20 grams protein   If you find a brand you enjoy that doesn't provide 20 grams protein, you can add leftover lean protein to the frozen meal   See notes below for several brand examples   Refer to the Eat Fit Shopping Guide for additional brand specific recommendations      -Portion Control:   Measure and/or weigh your food in cooked portions for the first time you start your plan   See what each portion looks like on your plate and then eyeball each portion for future meals and snacks        -Avoid/Limit the following as these may be inflammatory to our body and can decrease Energy throughout the day if consumed often:   Added Sugar   White, refined, processed carbohydrates   Alcohol   Fried Foods   -If you're having trouble finding a specific food brand, try looking on the brands website. Most companies have a 'store   find a store' on their website               Favorite Food Brands      - Whole Grain Breads:   Lavon's Killer Bread - 21 Whole Grains and Seeds (green label), Good Seed (yellow label), Power Seed (red label)    Thin sliced -or- regular slice   Any 100% whole wheat/whole grain bread   'Base Culture' 7 Nut + Seed and Original Paleo Bread (GF and found in freezer section)   Great to use for dinner since low carb      -Whole Grain Tortillas  Wraps:   Corn    Siete: Grain free tortillas: coconut flour  almond flour   [place] - Whole wheat tortillas + whole wheat carb balance      -High Protein Pastas:   'Banza' chickpea pastas:    Mac-n-cheese   Pasta's - all varieties    Red lentil  Yellow lentil pasta   Black bean pasta (aka squid ink pasta)   Edamame-based pasta      -Rice:   Brown rice (available in 10-minute boil in a bag)   Banza chickpea rice   RightRice -made from vegetables    10 grams  protein per serving      -Whole Grain Pancakes Mixes:   Greene Cakes: Power Cakes à 100% whole grain buttermilk flapjack & waffle mix   FlapJacked: Protein pancake & baking mix       -Grab and Go Protein Muffin:   FlapJacked: Mighty Muffin [typically found on baking aisle]      -Whole Grain Frozen Waffles:   Kashi GO Protein Waffles   Greene Cakes Gluten Free and Whole Grain Protein Waffles   Van's Power Grains Original      -Frozen Breakfast Sandwiches  Bowls for On-The-Go:   Deni Rivera Delights' (English muffin)   Deni Rivera Delights' Egg'Wich (the breadless breakfast)   Deni Rivera Egg Bites   GoodFood [egg white trace breakfast sandwich]   EVOL: Morning bowls  Lean and fit options      -Cold Cereals:   KashiGO grain free  Dark cocoa + cinnamon vanilla   Isidra Crunch keto-friendly cereals (GF)   :ratio KETO friendly cereal   Iwon organic protein crunchies   Three Wishes (GF)   Magic Spoon: grain-free cereals [[employer] or order on their website] (GF)   Also available in Single Serve Cups [[facility]]   Special K PROTEIN   Premier Protein       -Granola: [Recommend using as a topper for crunch, and not as a main meal]   ProGranola by David Skyword   Engine 2 Plant Strong   Good Granoly Health Nut   :ratio KETO friendly toasted almond granola      - Hot Cereals-Grab & Go Oatmeal Cups:   Powerful overnight oats   Sathish's [place]: Gluten free oatmeal with flax and Emanuel   Wild Friends: Oats & nut butter      -Whole Grain Chips:   SunChips   Beanito's  Beanfields bean chips   PopCorners- Flex Energy packed protein crisps       -High Protein Chips:   iwon organics protein stix  protein puffs   Quest   Protes      -Whole Grain Crackers:   Triscuits - Regular + thin crisps   Wheat Thins   Blue Jenise almond nut thins   Mame's Gone Cracker   Crunchmaster protein sea salt cracker      - Red Sauce (In A Jar):   Idris & Alicia's Heart Smart Sauce    Classico Original   Engine 2 Plant-Strong      -Protein  Granola Bars:   Nature  Valley Protein Chewy    Kashi Go Protein Bar    KIND Protein + KIND Bars (with 7 grams sugar or less)    Rx Bar    Rx Layers Bars    :ratio KETO friendly crunchy bar      -Ready-to-Drink Protein Drinks:   Iconic Grass-Fed Protein Drink   Orgain Clean- grass-fed + plant based   OWYN Plant-Based Protein Shake   Fairlife 30-gram Protein Drink   [facility] CORE POWER Protein Drink      -Nut Ringgold & Jellies:   Alejandro's Nut Ringgold   MaraNatha Nut Ringgold   PBfit Protein Peanut Butter & [place] Butter   SunButter, unsweetened   Jelly: Polaner's All Fruit NSA (no sugar added)      -Frozen Meals à Single Serving:   Healthy Choice: MAX    Healthy Choice: POWER Bowls   Happi Foodi: Carb Wise [Keto meal]   'Life Cuisine' Keto pizza   Realgood Co: Real Enchilada's   Eating Well: [Rouses]   Quest: Thin crust  low carb       -Frozen Meals à Bulk Low Carb Options:   Valdemar'flour Foods: Keto Lasagna   Realgood: Low carb Lightly breaded chicken strips/nuggets   Nature'sintent: Baked Cauli & Cheese [[facility] only]   Just Bare: Lightly breaded chicken breast strips/bites [[facility] only]      -Arnaud's Natural Foods à Heat and Eat Entrees    Found in refrigerated section of grocery stores   You can freeze these for up to 6 months      -Already Flavored Greek Yogurt:   Oikos Pro (20 grams protein  3 grams total sugar)   Chobani Less Sugar   Chobani Zero Sugar   Oikos Triple Zero   Two Good - All varieties    :ratio PROTEIN coconut Greek yogurt (25 grams protein  3 grams total sugar)      -BBQ Sauce:    MAXINE all natural    Primal Kitchen Classic BBQ sauce      -Salad Dressings:   Geovany's Salad Dressing: Sensation, Balsamic, Strawberry, Avocado, Caesar, Creole Ranch, Sweet Creole Mustard, Garlic & Red Wine?   Primal Kitchen- all varieties??   Malik's Own - Balsamic Vinaigrette, Classic Oil & Vinegar?         - Collagen: 'Vital Proteins' Collagen Peptides, unflavored:    Pantry staple   Can be added to soups, hummus, coffee, etc to make  higher protein      -Supplements:   Vitamin D3: 1,000 i.u per day    Fish Oil: Look for at least 1200 mg EPA + DHA per 2 capsules   Nordic's Natural's Ultimate Omega   MVI One-A-Day:   [facility] Adarsh Men -or- [facility]'s Women's Multivitamin Ultra Adarsh       To schedule/reschedule a nutrition follow-up appointment, please call [facility] at [phone]

## 2024-01-23 ENCOUNTER — PATIENT MESSAGE (OUTPATIENT)
Dept: ADMINISTRATIVE | Facility: HOSPITAL | Age: 49
End: 2024-01-23
Payer: COMMERCIAL

## 2024-03-14 ENCOUNTER — OFFICE VISIT (OUTPATIENT)
Dept: FAMILY MEDICINE | Facility: CLINIC | Age: 49
End: 2024-03-14
Payer: OTHER GOVERNMENT

## 2024-03-14 VITALS
BODY MASS INDEX: 52.14 KG/M2 | OXYGEN SATURATION: 99 % | TEMPERATURE: 98 F | DIASTOLIC BLOOD PRESSURE: 82 MMHG | SYSTOLIC BLOOD PRESSURE: 126 MMHG | HEART RATE: 88 BPM | HEIGHT: 62 IN | WEIGHT: 283.31 LBS

## 2024-03-14 DIAGNOSIS — R60.0 EDEMA, PERIPHERAL: ICD-10-CM

## 2024-03-14 DIAGNOSIS — R73.03 PREDIABETES: ICD-10-CM

## 2024-03-14 DIAGNOSIS — I10 PRIMARY HYPERTENSION: Primary | ICD-10-CM

## 2024-03-14 PROCEDURE — 99999 PR PBB SHADOW E&M-EST. PATIENT-LVL IV: CPT | Mod: PBBFAC,,, | Performed by: NURSE PRACTITIONER

## 2024-03-14 PROCEDURE — 99214 OFFICE O/P EST MOD 30 MIN: CPT | Mod: PBBFAC,PO | Performed by: NURSE PRACTITIONER

## 2024-03-14 PROCEDURE — 99214 OFFICE O/P EST MOD 30 MIN: CPT | Mod: S$PBB,,, | Performed by: NURSE PRACTITIONER

## 2024-03-14 RX ORDER — SPIRONOLACTONE 100 MG/1
100 TABLET, FILM COATED ORAL 2 TIMES DAILY
Qty: 180 TABLET | Refills: 3 | Status: SHIPPED | OUTPATIENT
Start: 2024-03-14 | End: 2025-03-14

## 2024-03-14 RX ORDER — AMLODIPINE BESYLATE 10 MG/1
10 TABLET ORAL NIGHTLY
Qty: 90 TABLET | Refills: 3 | Status: SHIPPED | OUTPATIENT
Start: 2024-03-14 | End: 2025-03-14

## 2024-03-14 RX ORDER — LOSARTAN POTASSIUM 25 MG/1
25 TABLET ORAL DAILY
Qty: 90 TABLET | Refills: 3 | Status: SHIPPED | OUTPATIENT
Start: 2024-03-14 | End: 2024-06-06

## 2024-03-18 ENCOUNTER — LAB VISIT (OUTPATIENT)
Dept: LAB | Facility: HOSPITAL | Age: 49
End: 2024-03-18
Attending: INTERNAL MEDICINE
Payer: OTHER GOVERNMENT

## 2024-03-18 DIAGNOSIS — R73.03 PREDIABETES: ICD-10-CM

## 2024-03-18 DIAGNOSIS — I10 PRIMARY HYPERTENSION: ICD-10-CM

## 2024-03-18 DIAGNOSIS — R60.0 EDEMA, PERIPHERAL: ICD-10-CM

## 2024-03-18 LAB
25(OH)D3+25(OH)D2 SERPL-MCNC: 9 NG/ML (ref 30–96)
ALBUMIN SERPL BCP-MCNC: 3.6 G/DL (ref 3.5–5.2)
ALP SERPL-CCNC: 79 U/L (ref 55–135)
ALT SERPL W/O P-5'-P-CCNC: 34 U/L (ref 10–44)
ANION GAP SERPL CALC-SCNC: 11 MMOL/L (ref 8–16)
AST SERPL-CCNC: 28 U/L (ref 10–40)
BASOPHILS # BLD AUTO: 0.06 K/UL (ref 0–0.2)
BASOPHILS NFR BLD: 0.7 % (ref 0–1.9)
BILIRUB SERPL-MCNC: 0.7 MG/DL (ref 0.1–1)
BUN SERPL-MCNC: 17 MG/DL (ref 6–20)
CALCIUM SERPL-MCNC: 9.1 MG/DL (ref 8.7–10.5)
CHLORIDE SERPL-SCNC: 108 MMOL/L (ref 95–110)
CHOLEST SERPL-MCNC: 197 MG/DL (ref 120–199)
CHOLEST/HDLC SERPL: 3.3 {RATIO} (ref 2–5)
CO2 SERPL-SCNC: 23 MMOL/L (ref 23–29)
CREAT SERPL-MCNC: 1 MG/DL (ref 0.5–1.4)
DIFFERENTIAL METHOD BLD: NORMAL
EOSINOPHIL # BLD AUTO: 0.2 K/UL (ref 0–0.5)
EOSINOPHIL NFR BLD: 2.2 % (ref 0–8)
ERYTHROCYTE [DISTWIDTH] IN BLOOD BY AUTOMATED COUNT: 12.8 % (ref 11.5–14.5)
EST. GFR  (NO RACE VARIABLE): >60 ML/MIN/1.73 M^2
ESTIMATED AVG GLUCOSE: 123 MG/DL (ref 68–131)
FERRITIN SERPL-MCNC: 167 NG/ML (ref 20–300)
FOLATE SERPL-MCNC: 8.4 NG/ML (ref 4–24)
GLUCOSE SERPL-MCNC: 98 MG/DL (ref 70–110)
HBA1C MFR BLD: 5.9 % (ref 4–5.6)
HCT VFR BLD AUTO: 41.4 % (ref 37–48.5)
HDLC SERPL-MCNC: 59 MG/DL (ref 40–75)
HDLC SERPL: 29.9 % (ref 20–50)
HGB BLD-MCNC: 13.4 G/DL (ref 12–16)
IMM GRANULOCYTES # BLD AUTO: 0.03 K/UL (ref 0–0.04)
IMM GRANULOCYTES NFR BLD AUTO: 0.3 % (ref 0–0.5)
IRON SERPL-MCNC: 72 UG/DL (ref 30–160)
LDLC SERPL CALC-MCNC: 122.4 MG/DL (ref 63–159)
LYMPHOCYTES # BLD AUTO: 3.7 K/UL (ref 1–4.8)
LYMPHOCYTES NFR BLD: 42.8 % (ref 18–48)
MAGNESIUM SERPL-MCNC: 1.7 MG/DL (ref 1.6–2.6)
MCH RBC QN AUTO: 29.2 PG (ref 27–31)
MCHC RBC AUTO-ENTMCNC: 32.4 G/DL (ref 32–36)
MCV RBC AUTO: 90 FL (ref 82–98)
MONOCYTES # BLD AUTO: 0.7 K/UL (ref 0.3–1)
MONOCYTES NFR BLD: 7.9 % (ref 4–15)
NEUTROPHILS # BLD AUTO: 4 K/UL (ref 1.8–7.7)
NEUTROPHILS NFR BLD: 46.1 % (ref 38–73)
NONHDLC SERPL-MCNC: 138 MG/DL
NRBC BLD-RTO: 0 /100 WBC
PLATELET # BLD AUTO: 304 K/UL (ref 150–450)
PMV BLD AUTO: 10.5 FL (ref 9.2–12.9)
POTASSIUM SERPL-SCNC: 3.6 MMOL/L (ref 3.5–5.1)
PROT SERPL-MCNC: 7.2 G/DL (ref 6–8.4)
RBC # BLD AUTO: 4.59 M/UL (ref 4–5.4)
SATURATED IRON: 20 % (ref 20–50)
SODIUM SERPL-SCNC: 142 MMOL/L (ref 136–145)
T4 FREE SERPL-MCNC: 0.92 NG/DL (ref 0.71–1.51)
TOTAL IRON BINDING CAPACITY: 360 UG/DL (ref 250–450)
TRANSFERRIN SERPL-MCNC: 243 MG/DL (ref 200–375)
TRIGL SERPL-MCNC: 78 MG/DL (ref 30–150)
TSH SERPL DL<=0.005 MIU/L-ACNC: 3.45 UIU/ML (ref 0.4–4)
VIT B12 SERPL-MCNC: 568 PG/ML (ref 210–950)
WBC # BLD AUTO: 8.7 K/UL (ref 3.9–12.7)

## 2024-03-18 PROCEDURE — 83735 ASSAY OF MAGNESIUM: CPT | Performed by: NURSE PRACTITIONER

## 2024-03-18 PROCEDURE — 36415 COLL VENOUS BLD VENIPUNCTURE: CPT | Mod: PO | Performed by: NURSE PRACTITIONER

## 2024-03-18 PROCEDURE — 82728 ASSAY OF FERRITIN: CPT | Performed by: NURSE PRACTITIONER

## 2024-03-18 PROCEDURE — 80053 COMPREHEN METABOLIC PANEL: CPT | Performed by: NURSE PRACTITIONER

## 2024-03-18 PROCEDURE — 83036 HEMOGLOBIN GLYCOSYLATED A1C: CPT | Performed by: NURSE PRACTITIONER

## 2024-03-18 PROCEDURE — 84439 ASSAY OF FREE THYROXINE: CPT | Performed by: NURSE PRACTITIONER

## 2024-03-18 PROCEDURE — 82746 ASSAY OF FOLIC ACID SERUM: CPT | Performed by: NURSE PRACTITIONER

## 2024-03-18 PROCEDURE — 85025 COMPLETE CBC W/AUTO DIFF WBC: CPT | Performed by: NURSE PRACTITIONER

## 2024-03-18 PROCEDURE — 82607 VITAMIN B-12: CPT | Performed by: NURSE PRACTITIONER

## 2024-03-18 PROCEDURE — 82306 VITAMIN D 25 HYDROXY: CPT | Performed by: NURSE PRACTITIONER

## 2024-03-18 PROCEDURE — 84443 ASSAY THYROID STIM HORMONE: CPT | Performed by: NURSE PRACTITIONER

## 2024-03-18 PROCEDURE — 80061 LIPID PANEL: CPT | Performed by: NURSE PRACTITIONER

## 2024-03-18 PROCEDURE — 83540 ASSAY OF IRON: CPT | Performed by: NURSE PRACTITIONER

## 2024-03-19 ENCOUNTER — PATIENT MESSAGE (OUTPATIENT)
Dept: FAMILY MEDICINE | Facility: CLINIC | Age: 49
End: 2024-03-19
Payer: OTHER GOVERNMENT

## 2024-03-19 DIAGNOSIS — E55.9 VITAMIN D DEFICIENCY: Primary | ICD-10-CM

## 2024-03-19 RX ORDER — ERGOCALCIFEROL 1.25 MG/1
50000 CAPSULE ORAL
Qty: 13 CAPSULE | Refills: 3 | Status: SHIPPED | OUTPATIENT
Start: 2024-03-19

## 2024-03-20 NOTE — PROGRESS NOTES
"Subjective:      Patient ID: Adama Ortiz is a 48 y.o. female.  Pt in clinic today for medication refills, BP is controlled with amlodipine and losartan, has minimal edema with aldactone. She is doing well and denies any acute complaints today.       Review of Systems   Constitutional:  Negative for activity change, appetite change, fever and unexpected weight change.   HENT:  Negative for hearing loss, rhinorrhea and trouble swallowing.    Eyes:  Negative for discharge and visual disturbance.   Respiratory:  Negative for chest tightness, shortness of breath and wheezing.    Cardiovascular:  Negative for chest pain and palpitations.   Gastrointestinal:  Negative for abdominal pain, blood in stool, constipation, diarrhea, nausea and vomiting.   Endocrine: Negative for polydipsia and polyuria.   Genitourinary:  Negative for difficulty urinating, dysuria, hematuria and menstrual problem.   Musculoskeletal:  Negative for arthralgias, joint swelling and neck pain.   Integumentary:  Negative for rash.   Neurological:  Negative for weakness and headaches.   Psychiatric/Behavioral:  Negative for confusion and dysphoric mood.    All other systems reviewed and are negative.        Objective:     Vitals:    03/14/24 1347   BP: 126/82   Pulse: 88   Temp: 98 °F (36.7 °C)   TempSrc: Oral   SpO2: 99%   Weight: 128.5 kg (283 lb 4.7 oz)   Height: 5' 2" (1.575 m)     Physical Exam  Vitals and nursing note reviewed.   Constitutional:       General: She is not in acute distress.     Appearance: Normal appearance. She is well-developed and well-groomed. She is not ill-appearing.   HENT:      Head: Normocephalic and atraumatic.      Right Ear: External ear normal.      Left Ear: External ear normal.      Nose: Nose normal.      Mouth/Throat:      Lips: Pink.      Mouth: Mucous membranes are moist.   Eyes:      General: Lids are normal. Vision grossly intact. Gaze aligned appropriately.      Conjunctiva/sclera: Conjunctivae normal.      " Pupils: Pupils are equal, round, and reactive to light.   Neck:      Trachea: Phonation normal.   Cardiovascular:      Rate and Rhythm: Normal rate and regular rhythm.      Heart sounds: Normal heart sounds.   Pulmonary:      Effort: Pulmonary effort is normal. No accessory muscle usage or respiratory distress.      Breath sounds: Normal breath sounds and air entry.   Abdominal:      General: Abdomen is flat. Bowel sounds are normal. There is no distension.      Palpations: Abdomen is soft.      Tenderness: There is no abdominal tenderness.   Musculoskeletal:      Cervical back: Neck supple.      Right lower leg: No edema.      Left lower leg: No edema.   Skin:     General: Skin is warm and dry.      Findings: No rash.   Neurological:      General: No focal deficit present.      Mental Status: She is alert and oriented to person, place, and time. Mental status is at baseline.      Sensory: Sensation is intact.      Motor: Motor function is intact.      Coordination: Coordination is intact.      Gait: Gait is intact.   Psychiatric:         Attention and Perception: Attention and perception normal.         Mood and Affect: Mood and affect normal.         Speech: Speech normal.         Behavior: Behavior normal. Behavior is cooperative.         Thought Content: Thought content normal.         Cognition and Memory: Cognition and memory normal.         Judgment: Judgment normal.        Latest Reference Range & Units 03/18/24 08:20   WBC 3.90 - 12.70 K/uL 8.70   RBC 4.00 - 5.40 M/uL 4.59   Hemoglobin 12.0 - 16.0 g/dL 13.4   Hematocrit 37.0 - 48.5 % 41.4   MCV 82 - 98 fL 90   MCH 27.0 - 31.0 pg 29.2   MCHC 32.0 - 36.0 g/dL 32.4   RDW 11.5 - 14.5 % 12.8   Platelet Count 150 - 450 K/uL 304   MPV 9.2 - 12.9 fL 10.5   Gran % 38.0 - 73.0 % 46.1   Lymph % 18.0 - 48.0 % 42.8   Mono % 4.0 - 15.0 % 7.9   Eos % 0.0 - 8.0 % 2.2   Basophil % 0.0 - 1.9 % 0.7   Immature Granulocytes 0.0 - 0.5 % 0.3   Gran # (ANC) 1.8 - 7.7 K/uL 4.0    Lymph # 1.0 - 4.8 K/uL 3.7   Mono # 0.3 - 1.0 K/uL 0.7   Eos # 0.0 - 0.5 K/uL 0.2   Baso # 0.00 - 0.20 K/uL 0.06   Immature Grans (Abs) 0.00 - 0.04 K/uL 0.03   nRBC 0 /100 WBC 0   Differential Method  Automated   Iron 30 - 160 ug/dL 72   TIBC 250 - 450 ug/dL 360   Saturated Iron 20 - 50 % 20   Transferrin 200 - 375 mg/dL 243   Ferritin 20.0 - 300.0 ng/mL 167   Folate 4.0 - 24.0 ng/mL 8.4   Vitamin B12 210 - 950 pg/mL 568   Sodium 136 - 145 mmol/L 142   Potassium 3.5 - 5.1 mmol/L 3.6   Chloride 95 - 110 mmol/L 108   CO2 23 - 29 mmol/L 23   Anion Gap 8 - 16 mmol/L 11   BUN 6 - 20 mg/dL 17   Creatinine 0.5 - 1.4 mg/dL 1.0   eGFR >60 mL/min/1.73 m^2 >60   Glucose 70 - 110 mg/dL 98   Calcium 8.7 - 10.5 mg/dL 9.1   Magnesium  1.6 - 2.6 mg/dL 1.7   ALP 55 - 135 U/L 79   PROTEIN TOTAL 6.0 - 8.4 g/dL 7.2   Albumin 3.5 - 5.2 g/dL 3.6   BILIRUBIN TOTAL 0.1 - 1.0 mg/dL 0.7   AST 10 - 40 U/L 28   ALT 10 - 44 U/L 34   Cholesterol Total 120 - 199 mg/dL 197   HDL 40 - 75 mg/dL 59   HDL/Cholesterol Ratio 20.0 - 50.0 % 29.9   Non-HDL Cholesterol mg/dL 138   Total Cholesterol/HDL Ratio 2.0 - 5.0  3.3   Triglycerides 30 - 150 mg/dL 78   LDL Cholesterol 63.0 - 159.0 mg/dL 122.4   Vitamin D 30 - 96 ng/mL 9 (L)   Hemoglobin A1C External 4.0 - 5.6 % 5.9 (H)   Estimated Avg Glucose 68 - 131 mg/dL 123   TSH 0.400 - 4.000 uIU/mL 3.447   Free T4 0.71 - 1.51 ng/dL 0.92   (L): Data is abnormally low  (H): Data is abnormally high  Assessment and Plan:     1. Primary hypertension  Continue current treatment regimen.  Dietary sodium restriction.  Regular aerobic exercise.  Weight loss.  Patient Education: Reviewed risks of hypertension and principles of treatment.  - CBC Auto Differential; Future  - Comprehensive Metabolic Panel; Future  - Ferritin; Future  - Folate; Future  - Iron and TIBC; Future  - Lipid Panel; Future  - Vitamin D; Future  - Vitamin B12; Future  - TSH; Future  - Magnesium; Future  - T4, Free; Future  - losartan (COZAAR) 25 MG  tablet; Take 1 tablet (25 mg total) by mouth once daily.  Dispense: 90 tablet; Refill: 3  - amLODIPine (NORVASC) 10 MG tablet; Take 1 tablet (10 mg total) by mouth every evening.  Dispense: 90 tablet; Refill: 3    2. Edema, peripheral  Recommendations: decrease sodium in the diet, elevate feet above the level of the heart whenever possible, increase physical activity, use of compression stockings, and weight loss.  The patient was also instructed to call IMMEDIATELY (i.e., day or night) if any cardiopulmonary symptoms occur, especially chest pain, shortness of breath, dyspnea on exertion, paroxysmal nocturnal dyspnea, or orthopnea, and these were explained.  - CBC Auto Differential; Future  - Comprehensive Metabolic Panel; Future  - Ferritin; Future  - Folate; Future  - Iron and TIBC; Future  - Lipid Panel; Future  - Vitamin D; Future  - Vitamin B12; Future  - TSH; Future  - Magnesium; Future  - T4, Free; Future  - spironolactone (ALDACTONE) 100 MG tablet; Take 1 tablet (100 mg total) by mouth 2 (two) times daily.  Dispense: 180 tablet; Refill: 3    3. BMI 50.0-59.9, adult  The patient is asked to make an attempt to improve diet and exercise patterns to aid in medical management of this problem.  General weight loss/lifestyle modification strategies discussed (elicit support from others; identify saboteurs; non-food rewards, etc).  - CBC Auto Differential; Future  - Comprehensive Metabolic Panel; Future  - Ferritin; Future  - Folate; Future  - Hemoglobin A1C; Future  - Iron and TIBC; Future  - Lipid Panel; Future  - Vitamin D; Future  - Vitamin B12; Future  - TSH; Future  - Magnesium; Future  - T4, Free; Future    4. Prediabetes  Education: Reviewed ABCs of diabetes management (respective goals in parentheses):  A1C (<7), blood pressure (<130/80), and cholesterol (LDL <100).  - Hemoglobin A1C; Future           NICK Beach, FNP-C  Family/Internal Medicine  [facility]

## 2024-05-21 ENCOUNTER — PATIENT MESSAGE (OUTPATIENT)
Dept: ADMINISTRATIVE | Facility: HOSPITAL | Age: 49
End: 2024-05-21
Payer: OTHER GOVERNMENT

## 2024-05-22 ENCOUNTER — PATIENT OUTREACH (OUTPATIENT)
Dept: ADMINISTRATIVE | Facility: HOSPITAL | Age: 49
End: 2024-05-22
Payer: OTHER GOVERNMENT

## 2024-05-22 NOTE — PROGRESS NOTES
Health Maintenance Due   Topic Date Due    COVID-19 Vaccine (4 - 2023-24 season) 09/01/2023    Colorectal Cancer Screening  10/11/2023    Mammogram  06/23/2024     Chart review done. HM updated. Immunizations reviewed & updated. Care Everywhere updated.  MAMMOGRAM SCHEDULED  PORTAL MESSAGE SENT ABOUT COLON CANCER SCREENING

## 2024-06-06 ENCOUNTER — CLINICAL SUPPORT (OUTPATIENT)
Dept: FAMILY MEDICINE | Facility: CLINIC | Age: 49
End: 2024-06-06
Payer: OTHER GOVERNMENT

## 2024-06-06 VITALS — DIASTOLIC BLOOD PRESSURE: 88 MMHG | SYSTOLIC BLOOD PRESSURE: 148 MMHG | HEART RATE: 68 BPM

## 2024-06-06 DIAGNOSIS — Z01.30 BP CHECK: Primary | ICD-10-CM

## 2024-06-06 DIAGNOSIS — I10 PRIMARY HYPERTENSION: ICD-10-CM

## 2024-06-06 PROCEDURE — 99999 PR PBB SHADOW E&M-EST. PATIENT-LVL II: CPT | Mod: PBBFAC,,,

## 2024-06-06 PROCEDURE — 99212 OFFICE O/P EST SF 10 MIN: CPT | Mod: PBBFAC,PO

## 2024-06-06 RX ORDER — LOSARTAN POTASSIUM 50 MG/1
50 TABLET ORAL DAILY
Qty: 30 TABLET | Refills: 0 | Status: SHIPPED | OUTPATIENT
Start: 2024-06-06 | End: 2025-06-06

## 2024-06-06 NOTE — PROGRESS NOTES
BP remains elevated.  Will have her continue Norvasc and spironolactone as is.  Losartan will be increased from 25 mg to 50 mg.  Please schedule patient for follow-up with me in 4 weeks to reassess her blood pressure.

## 2024-06-06 NOTE — PROGRESS NOTES
Adama Ortiz 48 y.o. female is here today for Blood Pressure check.   History of HTN yes.    Review of patient's allergies indicates:   Allergen Reactions    Aspirin Swelling    Keflex [cephalexin] Swelling and Rash     Creatinine   Date Value Ref Range Status   03/18/2024 1.0 0.5 - 1.4 mg/dL Final     Sodium   Date Value Ref Range Status   03/18/2024 142 136 - 145 mmol/L Final     Potassium   Date Value Ref Range Status   03/18/2024 3.6 3.5 - 5.1 mmol/L Final   ]  Patient verifies taking blood pressure medications on a regular basis at the same time of the day.     Current Outpatient Medications:     amLODIPine (NORVASC) 10 MG tablet, Take 1 tablet (10 mg total) by mouth every evening., Disp: 90 tablet, Rfl: 3    ergocalciferol (ERGOCALCIFEROL) 50,000 unit Cap, Take 1 capsule (50,000 Units total) by mouth every 7 days., Disp: 13 capsule, Rfl: 3    gabapentin (NEURONTIN) 300 MG capsule, Take 1 capsule (300 mg total) by mouth every evening., Disp: 30 capsule, Rfl: 0    losartan (COZAAR) 25 MG tablet, Take 1 tablet (25 mg total) by mouth once daily., Disp: 90 tablet, Rfl: 3    spironolactone (ALDACTONE) 100 MG tablet, Take 1 tablet (100 mg total) by mouth 2 (two) times daily., Disp: 180 tablet, Rfl: 3  Does patient have record of home blood pressure readings no.   Last dose of blood pressure medication was taken at 2200.  Patient is symptomatic.   Complains of headache.   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 Is going to change her medication routine.  156  ,90    pulse 68    Blood pressure reading after 15 minutes was 148/88, Pulse 68.  Dr. Sanchez notified.

## 2024-06-13 ENCOUNTER — TELEPHONE (OUTPATIENT)
Dept: FAMILY MEDICINE | Facility: CLINIC | Age: 49
End: 2024-06-13
Payer: OTHER GOVERNMENT

## 2024-06-13 NOTE — TELEPHONE ENCOUNTER
Trudi Sanchez MD filed at 6/6/2024  8:33 AM    Status: Signed   BP remains elevated.  Will have her continue Norvasc and spironolactone as is.  Losartan will be increased from 25 mg to 50 mg.  Please schedule patient for follow-up with me in 4 weeks to reassess her blood pressure.        Called patient no answer left message to call back and sent a message through the portal.

## 2024-06-24 ENCOUNTER — HOSPITAL ENCOUNTER (OUTPATIENT)
Dept: RADIOLOGY | Facility: HOSPITAL | Age: 49
Discharge: HOME | End: 2024-06-24
Attending: INTERNAL MEDICINE
Payer: OTHER GOVERNMENT

## 2024-06-24 DIAGNOSIS — Z12.31 ENCOUNTER FOR SCREENING MAMMOGRAM FOR BREAST CANCER: ICD-10-CM

## 2024-06-24 PROCEDURE — 77063 BREAST TOMOSYNTHESIS BI: CPT | Mod: 26,,, | Performed by: RADIOLOGY

## 2024-06-24 PROCEDURE — 77067 SCR MAMMO BI INCL CAD: CPT | Mod: TC

## 2024-06-24 PROCEDURE — 77067 SCR MAMMO BI INCL CAD: CPT | Mod: 26,,, | Performed by: RADIOLOGY

## 2024-07-19 DIAGNOSIS — I10 PRIMARY HYPERTENSION: ICD-10-CM

## 2024-07-19 RX ORDER — LOSARTAN POTASSIUM 50 MG/1
TABLET ORAL
Qty: 90 TABLET | Refills: 0 | OUTPATIENT
Start: 2024-07-19

## 2024-07-19 NOTE — TELEPHONE ENCOUNTER
Refill Routing Note   Medication(s) are not appropriate for processing by [facility] for the following reason(s):        New or recently adjusted medication  Required vitals abnormal    [facility] action(s):  Defer     Requires labs : Yes             Appointments  past 12m or future 3m with PCP    Date Provider   Last Visit   8/22/2023 Trudi Sanchez MD   Next Visit   Visit date not found Trudi Sanchez MD   ED visits in past 90 days: 0        Note composed:7:38 AM 07/19/2024

## 2024-07-19 NOTE — TELEPHONE ENCOUNTER
Provider Staff:  Please note Refusal of medication.     Action required for this patient.      Requested Prescriptions     Refused Prescriptions Disp Refills    losartan (COZAAR) 50 MG tablet [Pharmacy Med Name: LOSARTAN 50MG TABLETS] 90 tablet 0     Sig: TAKE 1 TABLET(50 MG) BY MOUTH DAILY     Refused By: SEMAJ GLOVER     Reason for Refusal: Patient needs an appointment      Thanks!  [facility]   Note composed: 07/19/2024 8:54 AM

## 2024-07-19 NOTE — TELEPHONE ENCOUNTER
Care Due:                  Date            Visit Type   Department     Provider  --------------------------------------------------------------------------------                                [facility]      [facility]/OF  MEDICINE/INTERN  Last Visit: 09-      FICE JOSELYN   AL MED         Trudi Sanchez  Next Visit: None Scheduled  None         None Found                                                            Last  Test          Frequency    Reason                     Performed    Due Date  --------------------------------------------------------------------------------    Office Visit  15 months..  losartan.................  09- 12-    [facility] Embedded Care Due Messages. Reference number: [id number].   7/19/2024 3:57:02 AM GAIL

## 2024-07-29 ENCOUNTER — CLINICAL SUPPORT (OUTPATIENT)
Dept: FAMILY MEDICINE | Facility: CLINIC | Age: 49
End: 2024-07-29
Payer: OTHER GOVERNMENT

## 2024-07-29 VITALS — HEART RATE: 68 BPM | SYSTOLIC BLOOD PRESSURE: 132 MMHG | DIASTOLIC BLOOD PRESSURE: 86 MMHG

## 2024-07-29 DIAGNOSIS — I10 PRIMARY HYPERTENSION: ICD-10-CM

## 2024-07-29 DIAGNOSIS — Z01.30 BP CHECK: Primary | ICD-10-CM

## 2024-07-29 PROCEDURE — 99999 PR PBB SHADOW E&M-EST. PATIENT-LVL I: CPT | Mod: PBBFAC,,,

## 2024-07-29 PROCEDURE — 99211 OFF/OP EST MAY X REQ PHY/QHP: CPT | Mod: PBBFAC,PO

## 2024-07-29 NOTE — PROGRESS NOTES
Adama Ortiz 48 y.o. female is here today for Blood Pressure check.   History of HTN yes.    Review of patient's allergies indicates:   Allergen Reactions    Aspirin Swelling    Keflex [cephalexin] Swelling and Rash     Creatinine   Date Value Ref Range Status   03/18/2024 1.0 0.5 - 1.4 mg/dL Final     Sodium   Date Value Ref Range Status   03/18/2024 142 136 - 145 mmol/L Final     Potassium   Date Value Ref Range Status   03/18/2024 3.6 3.5 - 5.1 mmol/L Final   ]  Patient verifies taking blood pressure medications on a regular basis at the same time of the day.     Current Outpatient Medications:     amLODIPine (NORVASC) 10 MG tablet, Take 1 tablet (10 mg total) by mouth every evening., Disp: 90 tablet, Rfl: 3    ergocalciferol (ERGOCALCIFEROL) 50,000 unit Cap, Take 1 capsule (50,000 Units total) by mouth every 7 days., Disp: 13 capsule, Rfl: 3    gabapentin (NEURONTIN) 300 MG capsule, Take 1 capsule (300 mg total) by mouth every evening., Disp: 30 capsule, Rfl: 0    losartan (COZAAR) 50 MG tablet, Take 1 tablet (50 mg total) by mouth once daily., Disp: 30 tablet, Rfl: 0    spironolactone (ALDACTONE) 100 MG tablet, Take 1 tablet (100 mg total) by mouth 2 (two) times daily. (Patient taking differently: Take 100 mg by mouth 2 (two) times daily as needed.), Disp: 180 tablet, Rfl: 3  Does patient have record of home blood pressure readings no.   Last dose of blood pressure medication was taken at yesterday evening.  Patient is asymptomatic.       BP: 132/86 , Pulse: 68 .

## 2024-07-30 RX ORDER — LOSARTAN POTASSIUM 50 MG/1
50 TABLET ORAL DAILY
Qty: 90 TABLET | Refills: 0 | Status: SHIPPED | OUTPATIENT
Start: 2024-07-30 | End: 2025-07-30

## 2024-08-06 ENCOUNTER — OFFICE VISIT (OUTPATIENT)
Dept: FAMILY MEDICINE | Facility: CLINIC | Age: 49
End: 2024-08-06
Payer: OTHER GOVERNMENT

## 2024-08-06 VITALS
SYSTOLIC BLOOD PRESSURE: 112 MMHG | OXYGEN SATURATION: 96 % | DIASTOLIC BLOOD PRESSURE: 78 MMHG | TEMPERATURE: 98 F | HEART RATE: 81 BPM | BODY MASS INDEX: 51.48 KG/M2 | HEIGHT: 62 IN | RESPIRATION RATE: 17 BRPM | WEIGHT: 279.75 LBS

## 2024-08-06 DIAGNOSIS — I87.2 STASIS DERMATITIS OF BOTH LEGS: Primary | ICD-10-CM

## 2024-08-06 PROCEDURE — 99214 OFFICE O/P EST MOD 30 MIN: CPT | Mod: S$PBB,,, | Performed by: NURSE PRACTITIONER

## 2024-08-06 PROCEDURE — 99999 PR PBB SHADOW E&M-EST. PATIENT-LVL IV: CPT | Mod: PBBFAC,,, | Performed by: NURSE PRACTITIONER

## 2024-08-06 PROCEDURE — 99214 OFFICE O/P EST MOD 30 MIN: CPT | Mod: PBBFAC,PO | Performed by: NURSE PRACTITIONER

## 2024-08-06 RX ORDER — TRIAMCINOLONE ACETONIDE 1 MG/G
OINTMENT TOPICAL 4 TIMES DAILY
Qty: 454 G | Refills: 2 | Status: SHIPPED | OUTPATIENT
Start: 2024-08-06

## 2024-08-12 ENCOUNTER — LAB VISIT (OUTPATIENT)
Dept: LAB | Facility: HOSPITAL | Age: 49
End: 2024-08-12
Attending: NURSE PRACTITIONER
Payer: OTHER GOVERNMENT

## 2024-08-12 ENCOUNTER — OFFICE VISIT (OUTPATIENT)
Dept: FAMILY MEDICINE | Facility: CLINIC | Age: 49
End: 2024-08-12
Payer: OTHER GOVERNMENT

## 2024-08-12 VITALS
OXYGEN SATURATION: 95 % | BODY MASS INDEX: 51.37 KG/M2 | DIASTOLIC BLOOD PRESSURE: 68 MMHG | HEART RATE: 84 BPM | WEIGHT: 279.13 LBS | SYSTOLIC BLOOD PRESSURE: 120 MMHG | RESPIRATION RATE: 18 BRPM | HEIGHT: 62 IN | TEMPERATURE: 98 F

## 2024-08-12 DIAGNOSIS — R10.2 PELVIC PAIN SYNDROME: Primary | ICD-10-CM

## 2024-08-12 DIAGNOSIS — R10.2 PELVIC PRESSURE IN FEMALE: Primary | ICD-10-CM

## 2024-08-12 LAB
BILIRUB UR QL STRIP: NEGATIVE
CLARITY UR: CLEAR
COLOR UR: YELLOW
GLUCOSE UR QL STRIP: NEGATIVE
HGB UR QL STRIP: NEGATIVE
KETONES UR QL STRIP: NEGATIVE
LEUKOCYTE ESTERASE UR QL STRIP: NEGATIVE
NITRITE UR QL STRIP: NEGATIVE
PH UR STRIP: 7 [PH] (ref 5–8)
PROT UR QL STRIP: NEGATIVE
SP GR UR STRIP: 1.02 (ref 1–1.03)
URN SPEC COLLECT METH UR: NORMAL
UROBILINOGEN UR STRIP-ACNC: NEGATIVE EU/DL

## 2024-08-12 PROCEDURE — 87088 URINE BACTERIA CULTURE: CPT | Performed by: NURSE PRACTITIONER

## 2024-08-12 PROCEDURE — 99214 OFFICE O/P EST MOD 30 MIN: CPT | Mod: S$PBB,,, | Performed by: NURSE PRACTITIONER

## 2024-08-12 PROCEDURE — 87147 CULTURE TYPE IMMUNOLOGIC: CPT | Performed by: NURSE PRACTITIONER

## 2024-08-12 PROCEDURE — 81003 URINALYSIS AUTO W/O SCOPE: CPT | Performed by: NURSE PRACTITIONER

## 2024-08-12 PROCEDURE — 87086 URINE CULTURE/COLONY COUNT: CPT | Performed by: NURSE PRACTITIONER

## 2024-08-12 PROCEDURE — 99215 OFFICE O/P EST HI 40 MIN: CPT | Mod: PBBFAC,PO | Performed by: NURSE PRACTITIONER

## 2024-08-12 PROCEDURE — 99999 PR PBB SHADOW E&M-EST. PATIENT-LVL V: CPT | Mod: PBBFAC,,, | Performed by: NURSE PRACTITIONER

## 2024-08-12 NOTE — PROGRESS NOTES
"Subjective:      Patient ID: Adama Ortiz is a 49 y.o. female.  Pt presents to clinic with progressive pelvic pressure that felt suddenly worse standing up cooking and bending over 2 days ago. Reports hx of extensive menstrual abnormalities and infertility with last menstrual cycle over a year ago. Denies vaginal pain, bleeding or discharge. Does reports bladder pressure with urination that has also worsened without dysuria, hematuria, urgency or frequency. Does report increasing physical activity with squats and bench work though no core, abdomen or back exercises. States "feels like my insides are falling out of my vagina"       Review of Systems   Constitutional:  Negative for activity change, appetite change, fatigue, fever, night sweats and unexpected weight change.   Respiratory:  Negative for chest tightness and shortness of breath.    Cardiovascular:  Positive for leg swelling. Negative for chest pain, palpitations and claudication.   Gastrointestinal:  Negative for abdominal distention, abdominal pain, anal bleeding, blood in stool, change in bowel habit, constipation, diarrhea, nausea, rectal pain, vomiting, reflux and fecal incontinence.   Genitourinary:  Positive for pelvic pain. Negative for bladder incontinence, decreased urine volume, difficulty urinating, dyspareunia, dysuria, enuresis, flank pain, frequency, genital sores, hematuria, nocturia, urgency, vaginal bleeding, vaginal discharge, vaginal pain and vaginal dryness.   Musculoskeletal:  Negative for arthralgias, back pain, gait problem and myalgias.   Integumentary:  Negative for rash.   Neurological:  Negative for headaches.   All other systems reviewed and are negative.        Objective:     Vitals:    08/12/24 1015   BP: 120/68   BP Location: Right arm   Patient Position: Sitting   BP Method: Large (Manual)   Pulse: 84   Resp: 18   Temp: 98.3 °F (36.8 °C)   TempSrc: Oral   SpO2: 95%   Weight: 126.6 kg (279 lb 1.6 oz)   Height: 5' 2" (1.575 " Anesthesia Post Evaluation    Patient: Siddharth Urias    Procedure(s) Performed: Procedure(s) (LRB):  RELEASE, CARPAL TUNNEL, RIGHT (Right)    Final Anesthesia Type: regional      Patient location during evaluation: PACU  Patient participation: Yes- Able to Participate  Level of consciousness: awake and alert and oriented  Post-procedure vital signs: reviewed and stable  Pain management: adequate  Airway patency: patent    PONV status at discharge: No PONV  Anesthetic complications: no      Cardiovascular status: blood pressure returned to baseline and hemodynamically stable  Respiratory status: unassisted, spontaneous ventilation and room air  Hydration status: euvolemic  Follow-up not needed.          Vitals Value Taken Time   /61 03/01/23 1015   Temp 36.6 03/01/23 1015   Pulse 86 03/01/23 1015   Resp 16 03/01/23 1015   SpO2 97% 03/01/23 1015         No case tracking events are documented in the log.      Pain/Edward Score: No data recorded       m)     Physical Exam  Vitals and nursing note reviewed.   Constitutional:       General: She is not in acute distress.     Appearance: Normal appearance. She is well-developed and well-groomed. She is not ill-appearing.   HENT:      Head: Normocephalic and atraumatic.      Right Ear: External ear normal.      Left Ear: External ear normal.      Nose: Nose normal.      Mouth/Throat:      Lips: Pink.      Mouth: Mucous membranes are moist.   Eyes:      General: Lids are normal. Vision grossly intact. Gaze aligned appropriately.      Conjunctiva/sclera: Conjunctivae normal.      Pupils: Pupils are equal, round, and reactive to light.   Neck:      Trachea: Phonation normal.   Cardiovascular:      Rate and Rhythm: Normal rate and regular rhythm.      Heart sounds: Normal heart sounds.   Pulmonary:      Effort: Pulmonary effort is normal. No accessory muscle usage or respiratory distress.      Breath sounds: Normal breath sounds and air entry.   Abdominal:      General: Abdomen is protuberant. Bowel sounds are normal. There is no distension.      Palpations: Abdomen is soft.      Tenderness: There is no abdominal tenderness.   Musculoskeletal:      Cervical back: Neck supple.      Right lower le+ Edema present.      Left lower le+ Edema present.   Skin:     General: Skin is warm and dry.      Findings: No rash.   Neurological:      General: No focal deficit present.      Mental Status: She is alert and oriented to person, place, and time. Mental status is at baseline.      Sensory: Sensation is intact.      Motor: Motor function is intact. No abnormal muscle tone.      Coordination: Coordination is intact.      Gait: Gait is intact. Gait normal.   Psychiatric:         Attention and Perception: Attention and perception normal.         Mood and Affect: Mood and affect normal.         Speech: Speech normal.         Behavior: Behavior normal. Behavior is cooperative.         Thought Content: Thought content normal.          Cognition and Memory: Cognition and memory normal.         Judgment: Judgment normal.       Assessment and Plan:     1. Pelvic pressure in female  Pelvic floor dysfunction vs prolapse, no fecal or urinary complaint, urine to r/o infection and f/u with gyn for pelvic exam, consider pelvic floor PT if fails to improve or worsen    - Ambulatory referral/consult to Obstetrics / Gynecology; Future  - Urinalysis; Future  - Urine Culture High Risk; Future           NICK Beach, FNP-C  Family/Internal Medicine  [facility]

## 2024-08-13 ENCOUNTER — PATIENT MESSAGE (OUTPATIENT)
Dept: FAMILY MEDICINE | Facility: CLINIC | Age: 49
End: 2024-08-13
Payer: OTHER GOVERNMENT

## 2024-08-13 DIAGNOSIS — N39.0 GROUP B STREPTOCOCCAL UTI: Primary | ICD-10-CM

## 2024-08-13 DIAGNOSIS — B95.1 GROUP B STREPTOCOCCAL UTI: Primary | ICD-10-CM

## 2024-08-13 RX ORDER — AMOXICILLIN AND CLAVULANATE POTASSIUM 875; 125 MG/1; MG/1
1 TABLET, FILM COATED ORAL EVERY 12 HOURS
Qty: 14 TABLET | Refills: 0 | Status: SHIPPED | OUTPATIENT
Start: 2024-08-13

## 2024-08-14 LAB — BACTERIA UR CULT: ABNORMAL

## 2024-08-23 ENCOUNTER — ON-DEMAND VIRTUAL (OUTPATIENT)
Dept: URGENT CARE | Facility: CLINIC | Age: 49
End: 2024-08-23
Payer: OTHER GOVERNMENT

## 2024-08-23 DIAGNOSIS — R10.9 ABDOMINAL DISCOMFORT: Primary | ICD-10-CM

## 2024-08-23 RX ORDER — ONDANSETRON 4 MG/1
4 TABLET, ORALLY DISINTEGRATING ORAL EVERY 8 HOURS PRN
Qty: 20 TABLET | Refills: 0 | Status: SHIPPED | OUTPATIENT
Start: 2024-08-23

## 2024-08-23 NOTE — PROGRESS NOTES
Subjective:      Patient ID: Adama Ortiz is a 49 y.o. female.    Vitals:  vitals were not taken for this visit.     Chief Complaint: Abdominal Pain      Visit Type: TELE AUDIOVISUAL    Present with the patient at the time of consultation: TELEMED PRESENT WITH PATIENT: None        Past Medical History:   Diagnosis Date    Hypertension      Past Surgical History:   Procedure Laterality Date    APPENDECTOMY      BREAST SURGERY      DILATION AND CURETTAGE OF UTERUS      TOTAL REDUCTION MAMMOPLASTY Bilateral 2006     Review of patient's allergies indicates:   Allergen Reactions    Aspirin Swelling    Keflex [cephalexin] Swelling and Rash     Current Outpatient Medications on File Prior to Visit   Medication Sig Dispense Refill    amLODIPine (NORVASC) 10 MG tablet Take 1 tablet (10 mg total) by mouth every evening. 90 tablet 3    amoxicillin-clavulanate 875-125mg (AUGMENTIN) 875-125 mg per tablet Take 1 tablet by mouth every 12 (twelve) hours. 14 tablet 0    ergocalciferol (ERGOCALCIFEROL) 50,000 unit Cap Take 1 capsule (50,000 Units total) by mouth every 7 days. 13 capsule 3    gabapentin (NEURONTIN) 300 MG capsule Take 1 capsule (300 mg total) by mouth every evening. (Patient not taking: Reported on 8/6/2024) 30 capsule 0    losartan (COZAAR) 50 MG tablet Take 1 tablet (50 mg total) by mouth once daily. 90 tablet 0    spironolactone (ALDACTONE) 100 MG tablet Take 1 tablet (100 mg total) by mouth 2 (two) times daily. (Patient not taking: Reported on 8/6/2024) 180 tablet 3    triamcinolone acetonide 0.1% (KENALOG) 0.1 % ointment Apply topically 4 (four) times daily. 454 g 2     No current facility-administered medications on file prior to visit.     Family History   Problem Relation Name Age of Onset    Hypertension Mother      Hyperlipidemia Mother      COPD Father Carlos Rivera     Diabetes Maternal Grandmother Estherlillian Gerry        Medications Ordered                [id number] - [place] [address]  [facility]   [address] [address]    Telephone: [phone]   Fax: [phone]   Hours: Not open 24 hours                         E-Prescribed (1 of 1)              ondansetron (ZOFRAN-ODT) 4 MG TbDL    Sig: Take 1 tablet (4 mg total) by mouth every 8 (eight) hours as needed (nausea).       Start: 8/23/24     Quantity: 20 tablet Refills: 0                           Ohs Peq Odvv Intake    8/23/2024  3:49 PM CDT - Filed by Patient   What is your current physical address in the event of a medical emergency? [address], [id number]   Are you able to take your vital signs? No   Please attach any relevant images or files          48 yo female with c/o diarrhea feeling and upset stomach. She states she feels like has to go but might go a little. She states symptoms started yesterday. She states positive nausea no vomiting. Last bm yesterday- normal. She states abdominal discomfort 6/10.        Constitution: Negative.   HENT: Negative.     Cardiovascular: Negative.    Respiratory: Negative.     Gastrointestinal:  Positive for abdominal bloating and diarrhea.   Endocrine: negative.   Genitourinary: Negative.  Negative for frequency and urgency.   Musculoskeletal: Negative.    Skin: Negative.    Allergic/Immunologic: Negative.    Neurological: Negative.    Hematologic/Lymphatic: Negative.    Psychiatric/Behavioral: Negative.          Objective:   The physical exam was conducted virtually.  LOCATION OF PATIENT home  Physical Exam   Constitutional: She is oriented to person, place, and time. She appears well-developed.   HENT:   Head: Normocephalic and atraumatic.   Ears:   Right Ear: Hearing, tympanic membrane and external ear normal.   Left Ear: Hearing, tympanic membrane and external ear normal.   Nose: Nose normal.   Mouth/Throat: Uvula is midline, oropharynx is clear and moist and mucous membranes are normal.   Eyes: Conjunctivae and EOM are normal. Pupils are equal,  round, and reactive to light.   Neck: Neck supple.   Cardiovascular: Normal rate.   Pulmonary/Chest: Effort normal and breath sounds normal.   Musculoskeletal: Normal range of motion.         General: Normal range of motion.   Neurological: She is alert and oriented to person, place, and time.   Skin: Skin is warm.   Psychiatric: Her behavior is normal. Thought content normal.   Nursing note and vitals reviewed.      Assessment:     1. Abdominal discomfort        Plan:     Follow up with your primary care provider if symptoms persist.  Go to the Emergency room for worsening of symptoms.     Abdominal discomfort  -     ondansetron (ZOFRAN-ODT) 4 MG TbDL; Take 1 tablet (4 mg total) by mouth every 8 (eight) hours as needed (nausea).  Dispense: 20 tablet; Refill: 0

## 2024-11-01 DIAGNOSIS — I10 PRIMARY HYPERTENSION: ICD-10-CM

## 2024-11-01 RX ORDER — LOSARTAN POTASSIUM 50 MG/1
TABLET ORAL
Qty: 90 TABLET | Refills: 0 | Status: SHIPPED | OUTPATIENT
Start: 2024-11-01

## 2024-11-13 ENCOUNTER — OFFICE VISIT (OUTPATIENT)
Dept: OBSTETRICS AND GYNECOLOGY | Facility: CLINIC | Age: 49
End: 2024-11-13
Payer: COMMERCIAL

## 2024-11-13 VITALS
BODY MASS INDEX: 50.81 KG/M2 | SYSTOLIC BLOOD PRESSURE: 124 MMHG | DIASTOLIC BLOOD PRESSURE: 80 MMHG | WEIGHT: 277.75 LBS

## 2024-11-13 DIAGNOSIS — Z31.89 ENCOUNTER FOR FERTILITY PLANNING: ICD-10-CM

## 2024-11-13 DIAGNOSIS — R10.2 PELVIC PRESSURE IN FEMALE: ICD-10-CM

## 2024-11-13 DIAGNOSIS — Z01.419 WELL WOMAN EXAM WITH ROUTINE GYNECOLOGICAL EXAM: Primary | ICD-10-CM

## 2024-11-13 PROCEDURE — 99999 PR PBB SHADOW E&M-EST. PATIENT-LVL IV: CPT | Mod: PBBFAC,,, | Performed by: OBSTETRICS & GYNECOLOGY

## 2024-11-13 NOTE — PROGRESS NOTES
History & Physical  Gynecology Problem Visit      SUBJECTIVE:     Chief Complaint: Annual Exam       History of Present Illness:  Annual Exam-Premenopausal  Ms. Ortiz is a 48 y/o female who presents for annual exam. Patient is s/p 3 failed embryo transfer of donor eggs at [facility], last attempt was 2021. Patient reports that she is no longer having periods. Patient current method of family planning is natural family planning.    GYN screening history: last pap: approximate date  and was normal and last mammogram: approximate date  and was normal. The patient wears seatbelts: yes. The patient participates in regular exercise: yes. Has the patient ever been transfused or tattooed?: not asked. The patient reports that there is not domestic violence in her life.      Review of patient's allergies indicates:   Allergen Reactions    Aspirin Swelling    Keflex [cephalexin] Swelling and Rash       Past Medical History:   Diagnosis Date    Hypertension      Past Surgical History:   Procedure Laterality Date    APPENDECTOMY      BREAST SURGERY      DILATION AND CURETTAGE OF UTERUS      TOTAL REDUCTION MAMMOPLASTY Bilateral      OB History          1    Para   0    Term   0       0    AB   1    Living   0         SAB   0    IAB   0    Ectopic   0    Multiple   0    Live Births                   Family History   Problem Relation Name Age of Onset    Hypertension Mother      Hyperlipidemia Mother      COPD Father Carlos Rivera     Diabetes Maternal Grandmother Argelia Mingowang      Social History     Tobacco Use    Smoking status: Never    Smokeless tobacco: Never   Substance Use Topics    Alcohol use: Yes     Comment: rarely    Drug use: Never       Current Outpatient Medications   Medication Sig    amLODIPine (NORVASC) 10 MG tablet Take 1 tablet (10 mg total) by mouth every evening.    gabapentin (NEURONTIN) 300 MG capsule Take 1 capsule (300 mg total) by mouth every evening. (Patient not taking:  Reported on 8/6/2024)    losartan (COZAAR) 50 MG tablet Take 50 mg by mouth once daily.    ondansetron (ZOFRAN-ODT) 4 MG TbDL Take 1 tablet (4 mg total) by mouth every 8 (eight) hours as needed (nausea).    promethazine-dextromethorphan (PROMETHAZINE-DM) 6.25-15 mg/5 mL Syrp Take 5 mLs by mouth nightly as needed.    spironolactone (ALDACTONE) 100 MG tablet Take 1 tablet (100 mg total) by mouth 2 (two) times daily.    triamcinolone acetonide 0.1% (KENALOG) 0.1 % ointment Apply topically 4 (four) times daily.     No current facility-administered medications for this visit.         Review of Systems:  Review of Systems   Constitutional:  Negative for activity change and fatigue.   HENT:  Negative for congestion, sneezing and sore throat.    Respiratory:  Negative for shortness of breath.    Cardiovascular:  Negative for chest pain.   Gastrointestinal:  Negative for abdominal pain, nausea and vomiting.   Genitourinary:  Positive for pelvic pain. Negative for flank pain, menstrual problem and vaginal discharge.   Musculoskeletal:  Negative for back pain.   Skin:  Negative for rash.   Neurological:  Negative for headaches.        OBJECTIVE:   Vitals:     Vitals:    11/13/24 1533   BP: 124/80   Weight: 126 kg (277 lb 12.5 oz)        Physical Exam:  Physical Exam  Vitals and nursing note reviewed. Exam conducted with a chaperone present.   Constitutional:       Appearance: She is well-developed.   Cardiovascular:      Rate and Rhythm: Normal rate.   Pulmonary:      Effort: Pulmonary effort is normal. No respiratory distress.   Chest:   Breasts:     Breasts are symmetrical.   Abdominal:      General: There is no distension.      Palpations: Abdomen is soft.      Tenderness: There is no abdominal tenderness.   Skin:     General: Skin is warm and dry.   Neurological:      Mental Status: She is alert and oriented to person, place, and time.       ASSESSMENT:       ICD-10-CM ICD-9-CM    1. Well woman exam with routine  gynecological exam  Z01.419 V72.31       2. Pelvic pressure in female  R10.2 625.8 Ambulatory referral/consult to Obstetrics / Gynecology      CANCELED: US Pelvis Comp with Transvag NON-OB (xpd      3. Encounter for fertility planning  Z31.89 V26.89              Plan:      Adama was seen today for annual exam.    Diagnoses and all orders for this visit:    Well woman exam with routine gynecological exam  - Pap smear normal 2022  - Mammogram 06/2024    Pelvic pressure in female  -     Ambulatory referral/consult to Obstetrics / Gynecology  -     US Pelvis Comp with Transvag NON-OB (xpd; Future  - If no anatomic anomaly then will send to pelvic therapy    Encounter for fertility planning  - Likely postmenopausal      No orders of the defined types were placed in this encounter.      Follow up in about 1 year (around 11/13/2025).

## 2024-11-19 ENCOUNTER — PATIENT MESSAGE (OUTPATIENT)
Dept: ADMINISTRATIVE | Facility: HOSPITAL | Age: 49
End: 2024-11-19
Payer: COMMERCIAL

## 2024-11-27 ENCOUNTER — HOSPITAL ENCOUNTER (OUTPATIENT)
Dept: RADIOLOGY | Facility: HOSPITAL | Age: 49
Discharge: HOME | End: 2024-11-27
Attending: OBSTETRICS & GYNECOLOGY
Payer: COMMERCIAL

## 2024-11-27 DIAGNOSIS — R10.2 PELVIC PRESSURE IN FEMALE: ICD-10-CM

## 2024-11-27 PROCEDURE — 76856 US EXAM PELVIC COMPLETE: CPT | Mod: TC

## 2024-11-27 PROCEDURE — 76856 US EXAM PELVIC COMPLETE: CPT | Mod: 26,,, | Performed by: STUDENT IN AN ORGANIZED HEALTH CARE EDUCATION/TRAINING PROGRAM

## 2024-12-10 ENCOUNTER — OFFICE VISIT (OUTPATIENT)
Dept: FAMILY MEDICINE | Facility: CLINIC | Age: 49
End: 2024-12-10
Payer: COMMERCIAL

## 2024-12-10 DIAGNOSIS — J06.9 ACUTE URI: Primary | ICD-10-CM

## 2024-12-10 PROCEDURE — 4010F ACE/ARB THERAPY RXD/TAKEN: CPT | Mod: CPTII,95,, | Performed by: FAMILY MEDICINE

## 2024-12-10 PROCEDURE — 3044F HG A1C LEVEL LT 7.0%: CPT | Mod: CPTII,95,, | Performed by: FAMILY MEDICINE

## 2024-12-10 PROCEDURE — 1159F MED LIST DOCD IN RCRD: CPT | Mod: CPTII,95,, | Performed by: FAMILY MEDICINE

## 2024-12-10 PROCEDURE — 99214 OFFICE O/P EST MOD 30 MIN: CPT | Mod: 95,,, | Performed by: FAMILY MEDICINE

## 2024-12-10 RX ORDER — PROMETHAZINE HYDROCHLORIDE AND DEXTROMETHORPHAN HYDROBROMIDE 6.25; 15 MG/5ML; MG/5ML
5 SYRUP ORAL NIGHTLY PRN
Qty: 118 ML | Refills: 0 | Status: SHIPPED | OUTPATIENT
Start: 2024-12-10 | End: 2025-01-03

## 2024-12-10 RX ORDER — AZITHROMYCIN 250 MG/1
TABLET, FILM COATED ORAL
Qty: 6 TABLET | Refills: 0 | Status: SHIPPED | OUTPATIENT
Start: 2024-12-10 | End: 2024-12-15

## 2024-12-10 RX ORDER — BENZONATATE 200 MG/1
200 CAPSULE ORAL 3 TIMES DAILY PRN
Qty: 30 CAPSULE | Refills: 0 | Status: SHIPPED | OUTPATIENT
Start: 2024-12-10 | End: 2024-12-20

## 2024-12-10 NOTE — PROGRESS NOTES
The patient location is: [place]  The chief complaint leading to consultation is: Cough (Productive )    Visit type: audiovisual    Each patient to whom he or she provides medical services by telemedicine is:  (1) informed of the relationship between the physician and patient and the respective role of any other health care provider with respect to management of the patient; and (2) notified that he or she may decline to receive medical services by telemedicine and may withdraw from such care at any time.      Subjective:       Patient ID: Adama Ortiz is a 49 y.o. female.    Chief Complaint: Cough (Productive )      Cough  This is a new problem. The current episode started 1 to 4 weeks ago. The problem has been gradually worsening. The cough is Productive of blood-tinged sputum. Associated symptoms include chest pain. She has tried OTC cough suppressant for the symptoms. The treatment provided no relief.       Review of Systems   Constitutional: Negative.    HENT:  Positive for congestion and sinus pressure.    Respiratory:  Positive for cough.    Cardiovascular:  Positive for chest pain.   Gastrointestinal: Negative.    Endocrine: Negative.    Genitourinary: Negative.    Musculoskeletal: Negative.    Neurological: Negative.    Psychiatric/Behavioral: Negative.            Past Medical History:   Diagnosis Date    Hypertension      Past Surgical History:   Procedure Laterality Date    APPENDECTOMY      BREAST SURGERY      DILATION AND CURETTAGE OF UTERUS      TOTAL REDUCTION MAMMOPLASTY Bilateral 2006     Family History   Problem Relation Name Age of Onset    Hypertension Mother      Hyperlipidemia Mother      COPD Father Carlos Rivera     Diabetes Maternal Grandmother Argelia Garrett      Social History     Socioeconomic History    Marital status:    Tobacco Use    Smoking status: Never    Smokeless tobacco: Never   Substance and Sexual Activity    Alcohol use: Yes     Comment: rarely    Drug use: Never     Sexual activity: Yes     Partners: Male     Birth control/protection: None     Social Drivers of Health     Financial Resource Strain: Low Risk  (12/10/2024)    Overall Financial Resource Strain (CARDIA)     Difficulty of Paying Living Expenses: Not very hard   Food Insecurity: No Food Insecurity (12/10/2024)    Hunger Vital Sign     Worried About Running Out of Food in the Last Year: Never true     Ran Out of Food in the Last Year: Never true   Transportation Needs: No Transportation Needs (11/8/2023)    PRAPARE - Transportation     Lack of Transportation (Medical): No     Lack of Transportation (Non-Medical): No   Physical Activity: Inactive (12/10/2024)    Exercise Vital Sign     Days of Exercise per Week: 0 days     Minutes of Exercise per Session: 0 min   Stress: No Stress Concern Present (12/10/2024)    [employer] [place] [employer] - Occupational Stress Questionnaire     Feeling of Stress : Not at all   Housing Stability: Low Risk  (11/8/2023)    Housing Stability Vital Sign     Unable to Pay for Housing in the Last Year: No     Number of Places Lived in the Last Year: 1     Unstable Housing in the Last Year: No       Current Outpatient Medications:     amLODIPine (NORVASC) 10 MG tablet, Take 1 tablet (10 mg total) by mouth every evening., Disp: 90 tablet, Rfl: 3    losartan (COZAAR) 50 MG tablet, Take 50 mg by mouth once daily., Disp: , Rfl:     ondansetron (ZOFRAN-ODT) 4 MG TbDL, Take 1 tablet (4 mg total) by mouth every 8 (eight) hours as needed (nausea)., Disp: 20 tablet, Rfl: 0    spironolactone (ALDACTONE) 100 MG tablet, Take 1 tablet (100 mg total) by mouth 2 (two) times daily., Disp: 180 tablet, Rfl: 3    triamcinolone acetonide 0.1% (KENALOG) 0.1 % ointment, Apply topically 4 (four) times daily., Disp: 454 g, Rfl: 2    azithromycin (Z-CHINA) 250 MG tablet, Take 2 tablets by mouth on day 1; Take 1 tablet by mouth on days 2-5, Disp: 6 tablet, Rfl: 0    benzonatate (TESSALON) 200 MG capsule,  Take 1 capsule (200 mg total) by mouth 3 (three) times daily as needed., Disp: 30 capsule, Rfl: 0    gabapentin (NEURONTIN) 300 MG capsule, Take 1 capsule (300 mg total) by mouth every evening. (Patient not taking: Reported on 8/6/2024), Disp: 30 capsule, Rfl: 0    promethazine-dextromethorphan (PROMETHAZINE-DM) 6.25-15 mg/5 mL Syrp, Take 5 mLs by mouth nightly as needed., Disp: 118 mL, Rfl: 0   Objective:      There were no vitals filed for this visit.    Physical Exam  Constitutional:       General: She is not in acute distress.     Appearance: She is not diaphoretic.   HENT:      Head: Normocephalic and atraumatic.   Eyes:      Conjunctiva/sclera: Conjunctivae normal.   Pulmonary:      Effort: Pulmonary effort is normal.   Musculoskeletal:         General: Normal range of motion.      Cervical back: Neck supple.   Skin:     Findings: No rash.   Neurological:      Mental Status: She is alert and oriented to person, place, and time.   Psychiatric:         Behavior: Behavior normal.         Thought Content: Thought content normal.         Judgment: Judgment normal.            Assessment:       1. Acute URI        Plan:       Acute URI  -     azithromycin (Z-CHINA) 250 MG tablet; Take 2 tablets by mouth on day 1; Take 1 tablet by mouth on days 2-5  Dispense: 6 tablet; Refill: 0  -     promethazine-dextromethorphan (PROMETHAZINE-DM) 6.25-15 mg/5 mL Syrp; Take 5 mLs by mouth nightly as needed.  Dispense: 118 mL; Refill: 0  -     benzonatate (TESSALON) 200 MG capsule; Take 1 capsule (200 mg total) by mouth 3 (three) times daily as needed.  Dispense: 30 capsule; Refill: 0      No future appointments.                Patient note was created using [employer].  Any errors in syntax or even information may not have been identified and edited on initial review prior to signing this note.

## 2025-01-14 RX ORDER — LOSARTAN POTASSIUM 50 MG/1
50 TABLET ORAL DAILY
Qty: 90 TABLET | Refills: 0 | Status: SHIPPED | OUTPATIENT
Start: 2025-01-14

## 2025-01-14 NOTE — TELEPHONE ENCOUNTER
----- Message from Tammie sent at 1/14/2025  3:01 PM CST -----  Who Called: Self         Refill or New Rx:Refill         RX Name and Strength:losartan (COZAAR) 50 MG tablet        Is this a 30 day or 90 day RX:        Preferred Pharmacy with phone number: .  [id number] - [place] - [address] [place] [address]  [place] [address]  Phone: [phone] Fax: [phone]             Would the patient rather a call back or a response via My [facility]? Call Back         Best Call Back Number: .[phone]          Additional Information:

## 2025-01-14 NOTE — TELEPHONE ENCOUNTER
No care due was identified.  Health [facility] Embedded Care Due Messages. Reference number: [id number].   1/14/2025 3:28:06 PM CST

## 2025-02-12 ENCOUNTER — OFFICE VISIT (OUTPATIENT)
Dept: FAMILY MEDICINE | Facility: CLINIC | Age: 50
End: 2025-02-12
Payer: COMMERCIAL

## 2025-02-12 VITALS
SYSTOLIC BLOOD PRESSURE: 138 MMHG | WEIGHT: 273.81 LBS | TEMPERATURE: 98 F | BODY MASS INDEX: 50.39 KG/M2 | OXYGEN SATURATION: 96 % | HEIGHT: 62 IN | HEART RATE: 78 BPM | DIASTOLIC BLOOD PRESSURE: 88 MMHG

## 2025-02-12 DIAGNOSIS — Z12.11 ENCOUNTER FOR COLORECTAL CANCER SCREENING: ICD-10-CM

## 2025-02-12 DIAGNOSIS — R60.0 EDEMA, PERIPHERAL: ICD-10-CM

## 2025-02-12 DIAGNOSIS — Z12.12 ENCOUNTER FOR COLORECTAL CANCER SCREENING: ICD-10-CM

## 2025-02-12 DIAGNOSIS — I10 PRIMARY HYPERTENSION: Primary | ICD-10-CM

## 2025-02-12 PROCEDURE — 99999 PR PBB SHADOW E&M-EST. PATIENT-LVL IV: CPT | Mod: PBBFAC,,, | Performed by: INTERNAL MEDICINE

## 2025-02-12 RX ORDER — LOSARTAN POTASSIUM 50 MG/1
50 TABLET ORAL DAILY
Qty: 90 TABLET | Refills: 3 | Status: SHIPPED | OUTPATIENT
Start: 2025-02-12

## 2025-02-12 RX ORDER — AMLODIPINE BESYLATE 10 MG/1
10 TABLET ORAL NIGHTLY
Qty: 90 TABLET | Refills: 3 | Status: SHIPPED | OUTPATIENT
Start: 2025-02-12 | End: 2026-02-12

## 2025-02-12 RX ORDER — SPIRONOLACTONE 25 MG/1
25 TABLET ORAL DAILY
Qty: 30 TABLET | Refills: 0 | Status: SHIPPED | OUTPATIENT
Start: 2025-02-12 | End: 2026-02-12

## 2025-02-12 NOTE — PROGRESS NOTES
SUBJECTIVE     Chief Complaint   Patient presents with    Medication Refill       HPI  Adama Ortiz is a 49 y.o. female with multiple medical diagnoses as listed in the medical history and problem list that presents for medication refill for her hypertension, specifically the losartan. Patient states that she has been well since her last visit. Patient states that they have had no adverse side effects from the medications, and has been sleeping well. Patient denies any pre-syncope, syncope, SOB. However, patient endorses mild wheezing at night that she attributes to a cold she had last month. Patient endorses swelling in her legs at work that reduces upon movement or elevating the legs.    PAST MEDICAL HISTORY:  Past Medical History:   Diagnosis Date    Hypertension        PAST SURGICAL HISTORY:  Past Surgical History:   Procedure Laterality Date    APPENDECTOMY      BREAST SURGERY      DILATION AND CURETTAGE OF UTERUS      TOTAL REDUCTION MAMMOPLASTY Bilateral 2006       SOCIAL HISTORY:  Social History     Socioeconomic History    Marital status:    Tobacco Use    Smoking status: Never    Smokeless tobacco: Never   Substance and Sexual Activity    Alcohol use: Yes     Comment: rarely    Drug use: Never    Sexual activity: Yes     Partners: Male     Birth control/protection: None     Social Drivers of Health     Financial Resource Strain: Low Risk  (12/10/2024)    Overall Financial Resource Strain (CARDIA)     Difficulty of Paying Living Expenses: Not very hard   Food Insecurity: No Food Insecurity (12/10/2024)    Hunger Vital Sign     Worried About Running Out of Food in the Last Year: Never true     Ran Out of Food in the Last Year: Never true   Transportation Needs: No Transportation Needs (11/8/2023)    PRAPARE - Transportation     Lack of Transportation (Medical): No     Lack of Transportation (Non-Medical): No   Physical Activity: Inactive (12/10/2024)    Exercise Vital Sign     Days of Exercise per  Week: 0 days     Minutes of Exercise per Session: 0 min   Stress: No Stress Concern Present (12/10/2024)    [place] [place] [employer] - Occupational Stress Questionnaire     Feeling of Stress : Not at all   Housing Stability: Low Risk  (11/8/2023)    Housing Stability Vital Sign     Unable to Pay for Housing in the Last Year: No     Number of Places Lived in the Last Year: 1     Unstable Housing in the Last Year: No       FAMILY HISTORY:  Family History   Problem Relation Name Age of Onset    Hypertension Mother      Hyperlipidemia Mother      COPD Father Carlos Rivera     Diabetes Maternal Grandmother Argelia Mingowang        ALLERGIES AND MEDICATIONS: updated and reviewed.  Review of patient's allergies indicates:   Allergen Reactions    Aspirin Swelling    Keflex [cephalexin] Swelling and Rash     Current Outpatient Medications   Medication Sig Dispense Refill    triamcinolone acetonide 0.1% (KENALOG) 0.1 % ointment Apply topically 4 (four) times daily. 454 g 2    amLODIPine (NORVASC) 10 MG tablet Take 1 tablet (10 mg total) by mouth every evening. 90 tablet 3    gabapentin (NEURONTIN) 300 MG capsule Take 1 capsule (300 mg total) by mouth every evening. (Patient not taking: Reported on 8/6/2024) 30 capsule 0    losartan (COZAAR) 50 MG tablet Take 1 tablet (50 mg total) by mouth once daily. 90 tablet 3    spironolactone (ALDACTONE) 25 MG tablet Take 1 tablet (25 mg total) by mouth once daily. 30 tablet 0     No current facility-administered medications for this visit.       ROS  Review of Systems   Constitutional: Negative.    HENT:  Positive for postnasal drip.    Eyes: Negative.    Respiratory:  Positive for shortness of breath and wheezing.    Cardiovascular:  Positive for leg swelling.   Gastrointestinal: Negative.    Endocrine: Negative.    Genitourinary: Negative.    Musculoskeletal: Negative.    Skin: Negative.    Allergic/Immunologic: Negative.    Neurological: Negative.    Hematological:  "Negative.    Psychiatric/Behavioral: Negative.           OBJECTIVE     Physical Exam  Vitals:    02/12/25 0836   BP: 138/88   Pulse: 78   Temp: 97.6 °F (36.4 °C)    Body mass index is 50.08 kg/m².  Weight: 124.2 kg (273 lb 13 oz)   Height: 5' 2" (157.5 cm)     Physical Exam  Constitutional:       Appearance: Normal appearance. She is obese.   HENT:      Right Ear: External ear normal.      Left Ear: External ear normal.   Cardiovascular:      Rate and Rhythm: Normal rate and regular rhythm.      Pulses: Normal pulses.      Heart sounds: Normal heart sounds.   Pulmonary:      Effort: Pulmonary effort is normal.      Breath sounds: Normal breath sounds.   Abdominal:      General: Abdomen is flat. Bowel sounds are normal.      Palpations: Abdomen is soft.   Neurological:      General: No focal deficit present.      Mental Status: She is alert and oriented to person, place, and time. Mental status is at baseline.   Psychiatric:         Mood and Affect: Mood normal.         Behavior: Behavior normal.         Thought Content: Thought content normal.         Judgment: Judgment normal.         Health Maintenance         Date Due Completion Date    Colorectal Cancer Screening 10/11/2023 10/11/2022    COVID-19 Vaccine (4 - 2024-25 season) 09/01/2024 10/13/2021    Hemoglobin A1c (Prediabetes) 03/18/2025 3/18/2024    Lipid Panel 03/18/2025 3/18/2024    Mammogram 06/24/2025 6/24/2024    Cervical Cancer Screening 02/09/2027 2/9/2022    TETANUS VACCINE 09/26/2032 9/26/2022    RSV Vaccine (Age 60+ and Pregnant patients) (1 - 1-dose 75+ series) 07/30/2050 ---              ASSESSMENT     49 y.o. female with     1. Primary hypertension    2. Edema, peripheral    3. Encounter for colorectal cancer screening        PLAN:     1. Primary hypertension  - Lower dosage of spironolactone to improve daily adherence  - spironolactone (ALDACTONE) 25 MG tablet; Take 1 tablet (25 mg total) by mouth once daily.  Dispense: 30 tablet; Refill: 0  - " losartan (COZAAR) 50 MG tablet; Take 1 tablet (50 mg total) by mouth once daily.  Dispense: 90 tablet; Refill: 3  - amLODIPine (NORVASC) 10 MG tablet; Take 1 tablet (10 mg total) by mouth every evening.  Dispense: 90 tablet; Refill: 3    2. Edema, peripheral  - spironolactone (ALDACTONE) 25 MG tablet; Take 1 tablet (25 mg total) by mouth once daily.  Dispense: 30 tablet; Refill: 0    3. Encounter for colorectal cancer screening  - Ambulatory referral/consult to Endo Procedure ; Future        RTC in 3 months     [place], [place]  02/12/2025 8:46 AM        Follow up in about 3 months (around 5/12/2025) for WELLNESS CHECK.

## 2025-02-12 NOTE — PROGRESS NOTES
1. Primary hypertension  - BP well controlled; at goal of <140/90, but at upper limits of normal  - will decrease Aldactone from 100 b.i.d. to 25 mg Q 24 so that she can tolerate it better and improve compliance and further decrease the blood pressure; patient is noncompliant with Aldactone currently at 100 b.i.d. as she does not drink much water daily  - spironolactone (ALDACTONE) 25 MG tablet; Take 1 tablet (25 mg total) by mouth once daily.  Dispense: 30 tablet; Refill: 0  - losartan (COZAAR) 50 MG tablet; Take 1 tablet (50 mg total) by mouth once daily.  Dispense: 90 tablet; Refill: 3  - amLODIPine (NORVASC) 10 MG tablet; Take 1 tablet (10 mg total) by mouth every evening.  Dispense: 90 tablet; Refill: 3    2. Edema, peripheral  - As above  - spironolactone (ALDACTONE) 25 MG tablet; Take 1 tablet (25 mg total) by mouth once daily.  Dispense: 30 tablet; Refill: 0    3. Encounter for colorectal cancer screening  - Ambulatory referral/consult to Endo Procedure ; Future          RTC in 3-6 months for annual exam

## 2025-03-13 DIAGNOSIS — I10 PRIMARY HYPERTENSION: ICD-10-CM

## 2025-03-13 DIAGNOSIS — R60.0 EDEMA, PERIPHERAL: ICD-10-CM

## 2025-03-13 RX ORDER — SPIRONOLACTONE 25 MG/1
TABLET ORAL
Qty: 90 TABLET | Refills: 1 | Status: SHIPPED | OUTPATIENT
Start: 2025-03-13

## 2025-03-13 NOTE — TELEPHONE ENCOUNTER
Refill Routing Note   Medication(s) are not appropriate for processing by [facility] for the following reason(s):        New or recently adjusted medication    [facility] action(s):  Defer     Requires labs : Yes             Appointments  past 12m or future 3m with PCP    Date Provider   Last Visit   2/12/2025 Trudi Sanchez MD   Next Visit   Visit date not found Trudi Sanchez MD   ED visits in past 90 days: 0        Note composed:12:25 PM 03/13/2025

## 2025-03-13 NOTE — TELEPHONE ENCOUNTER
Care Due:                  Date            Visit Type   Department     Provider  --------------------------------------------------------------------------------                                [facility]                              PRIMARY      MEDICINE/INTERN  Last Visit: 02-      CARE ([facility])   AL MED         Trudi Sanchez  Next Visit: None Scheduled  None         None Found                                                            Last  Test          Frequency    Reason                     Performed    Due Date  --------------------------------------------------------------------------------    CMP.........  12 months..  losartan, spironolactone.  03- 03-    [facility] Embedded Care Due Messages. Reference number: [id number].   3/13/2025 9:18:29 AM CDT

## 2025-03-25 ENCOUNTER — OFFICE VISIT (OUTPATIENT)
Dept: PODIATRY | Facility: CLINIC | Age: 50
End: 2025-03-25
Payer: COMMERCIAL

## 2025-03-25 VITALS — BODY MASS INDEX: 53.92 KG/M2 | HEIGHT: 62 IN | WEIGHT: 293 LBS

## 2025-03-25 DIAGNOSIS — M79.672 PAIN IN BOTH FEET: ICD-10-CM

## 2025-03-25 DIAGNOSIS — B35.1 ONYCHOMYCOSIS DUE TO DERMATOPHYTE: Primary | ICD-10-CM

## 2025-03-25 DIAGNOSIS — R73.03 PRE-DIABETES: ICD-10-CM

## 2025-03-25 DIAGNOSIS — M79.671 PAIN IN BOTH FEET: ICD-10-CM

## 2025-03-25 PROCEDURE — 3008F BODY MASS INDEX DOCD: CPT | Mod: CPTII,S$GLB,, | Performed by: PODIATRIST

## 2025-03-25 PROCEDURE — 99999 PR PBB SHADOW E&M-EST. PATIENT-LVL III: CPT | Mod: PBBFAC,,, | Performed by: PODIATRIST

## 2025-03-25 PROCEDURE — 1159F MED LIST DOCD IN RCRD: CPT | Mod: CPTII,S$GLB,, | Performed by: PODIATRIST

## 2025-03-25 PROCEDURE — 99213 OFFICE O/P EST LOW 20 MIN: CPT | Mod: S$GLB,,, | Performed by: PODIATRIST

## 2025-03-25 PROCEDURE — 4010F ACE/ARB THERAPY RXD/TAKEN: CPT | Mod: CPTII,S$GLB,, | Performed by: PODIATRIST

## 2025-03-25 RX ORDER — LIDOCAINE 50 MG/G
OINTMENT TOPICAL 2 TIMES DAILY
Qty: 240 G | Refills: 3 | Status: SHIPPED | OUTPATIENT
Start: 2025-03-25

## 2025-03-25 RX ORDER — CICLOPIROX 80 MG/ML
SOLUTION TOPICAL NIGHTLY
Qty: 6.6 ML | Refills: 3 | Status: SHIPPED | OUTPATIENT
Start: 2025-03-25

## 2025-03-25 NOTE — PATIENT INSTRUCTIONS
Kerasal for fungal nails apply daily to all toenails.  Can be found at [facility]

## 2025-03-25 NOTE — PROGRESS NOTES
Subjective:     Patient ID: Adama Ortiz is a 49 y.o. female.    Chief Complaint: Nail Problem (Toenail pain)    Adama is a 49 y.o. female who presents to the clinic complaining of thick and discolored toenails on both feet. Adama is inquiring about treatment options.    Review of Systems   Constitutional: Negative for chills.   Cardiovascular:  Negative for chest pain and claudication.   Respiratory:  Negative for cough.    Skin:  Positive for color change, dry skin and nail changes.   Musculoskeletal:  Positive for joint pain.   Gastrointestinal:  Negative for nausea.   Neurological:  Positive for paresthesias. Negative for numbness.   Psychiatric/Behavioral:  The patient is not nervous/anxious.         Objective:     Physical Exam  Constitutional:       Appearance: She is well-developed.      Comments: Oriented to time, place, and person.   Cardiovascular:      Comments: DP and PT pulses are palpable bilaterally. 3 sec capillary refill time and toes and feet are warm to touch proximally .  There is  hair growth on the feet and toes b/l. There is no edema b/l. No spider veins or varicosities present b/l.     Musculoskeletal:      Comments: Equinus noted b/l ankles with < 10 deg DF noted. MMT 5/5 in DF/PF/Inv/Ev resistance with no reproduction of pain in any direction. Passive range of motion of ankle and pedal joints is painless b/l.     Feet:      Right foot:      Skin integrity: No callus or dry skin.      Left foot:      Skin integrity: No callus or dry skin.   Lymphadenopathy:      Comments: Negative lymphadenopathy bilateral popliteal fossa and tarsal tunnel.   Skin:     Comments:   Toenails 1-5 bilaterally are elongated by 2-3 mm, thickened by 2-3 mm, discolored/yellowed, dystrophic, brittle with subungual debris.       Neurological:      Mental Status: She is alert.      Comments: Light touch, proprioception, and sharp/dull sensation are all intact bilaterally. Protective threshold with the  Auburn-Wienstein monofilament is intact bilaterally.      Subjective paresthesias with no clearly identifiable source or trigger.        Psychiatric:         Behavior: Behavior is cooperative.           Assessment:      Encounter Diagnoses   Name Primary?    Onychomycosis due to dermatophyte Yes    Pre-diabetes      Plan:     Adama was seen today for nail problem.    Diagnoses and all orders for this visit:    Onychomycosis due to dermatophyte    Pre-diabetes    Other orders  -     ciclopirox (PENLAC) 8 % Soln; Apply topically nightly.  -     LIDOcaine (XYLOCAINE) 5 % Oint ointment; Apply topically 2 (two) times a day.      I counseled the patient on her conditions, their implications and medical management.      - Shoe inspection. Diabetic Foot Education. Patient reminded of the importance of good nutrition and blood sugar control to help prevent podiatric complications of diabetes. Patient instructed on proper foot hygeine. We discussed wearing proper shoe gear, daily foot inspections, never walking without protective shoe gear, caution putting sharp instruments to feet     - Discussed DM foot care:  Wear comfortable, proper fitting shoes. Wash feet daily. Dry well. After drying, apply moisturizer to feet (no lotion to webspaces). Inspect feet daily for skin breaks, blisters, swelling, or redness. Wear cotton socks (preferably white)  Change socks every day. Do NOT walk barefoot. Do NOT use heating pads or warm/hot water soaks     Rx Voltaren gel to be applied to affected area up to 3-4 x daily as needed for pain     Nails 1-5 B/L feet trimmed. Patient is aware that routine trimming of nails or calluses will not be covered service per insurance and he will fall under Proc B if this service is desired. Patient verbalized understanding of this. RTC as needed for Proc B or any other pedal complaint.

## 2025-04-02 ENCOUNTER — TELEPHONE (OUTPATIENT)
Dept: ENDOSCOPY | Facility: HOSPITAL | Age: 50
End: 2025-04-02

## 2025-04-02 ENCOUNTER — CLINICAL SUPPORT (OUTPATIENT)
Dept: ENDOSCOPY | Facility: HOSPITAL | Age: 50
End: 2025-04-02
Attending: INTERNAL MEDICINE
Payer: COMMERCIAL

## 2025-04-02 VITALS — BODY MASS INDEX: 50.61 KG/M2 | WEIGHT: 275 LBS | HEIGHT: 62 IN

## 2025-04-02 DIAGNOSIS — Z12.11 ENCOUNTER FOR COLORECTAL CANCER SCREENING: ICD-10-CM

## 2025-04-02 DIAGNOSIS — Z12.12 ENCOUNTER FOR COLORECTAL CANCER SCREENING: ICD-10-CM

## 2025-04-02 RX ORDER — SODIUM, POTASSIUM,MAG SULFATES 17.5-3.13G
1 SOLUTION, RECONSTITUTED, ORAL ORAL DAILY
Qty: 1 KIT | Refills: 0 | Status: SHIPPED | OUTPATIENT
Start: 2025-04-02 | End: 2025-04-04

## 2025-04-02 NOTE — TELEPHONE ENCOUNTER
.Referral for procedure from PAT appointment      Spoke to patient to schedule procedure(s) Colonoscopy       Physician to perform procedure(s) Dr. EVELIO Marie  Date of Procedure (s) 5/22/25  Arrival Time 6:30 AM  Time of Procedure(s) 7:30 AM   Location of Procedure(s) [address]   Type of Rx Prep sent to patient: Suprep  Instructions provided to patient via [facility]    Patient was informed on the following information and verbalized understanding. Screening questionnaire reviewed with patient and complete. If procedure requires anesthesia, a responsible adult needs to be present to accompany the patient home, patient cannot drive after receiving anesthesia. Appointment details are tentative, especially check-in time. Patient will receive a prep-op call 7 days prior to confirm check-in time for procedure. If applicable the patient should contact their pharmacy to verify Rx for procedure prep is ready for pick-up. Patient was advised to call the scheduling department at [phone] if pharmacy states no Rx is available. Patient was advised to call the endoscopy scheduling department if any questions or concerns arise.      [facility] Scheduling Department

## 2025-04-14 DIAGNOSIS — I10 PRIMARY HYPERTENSION: ICD-10-CM

## 2025-04-15 RX ORDER — AMLODIPINE BESYLATE 10 MG/1
10 TABLET ORAL NIGHTLY
Qty: 90 TABLET | Refills: 2 | Status: SHIPPED | OUTPATIENT
Start: 2025-04-15

## 2025-04-23 ENCOUNTER — NURSE TRIAGE (OUTPATIENT)
Dept: ADMINISTRATIVE | Facility: CLINIC | Age: 50
End: 2025-04-23
Payer: COMMERCIAL

## 2025-04-23 ENCOUNTER — OFFICE VISIT (OUTPATIENT)
Dept: FAMILY MEDICINE | Facility: CLINIC | Age: 50
End: 2025-04-23
Payer: COMMERCIAL

## 2025-04-23 DIAGNOSIS — J02.0 STREPTOCOCCAL PHARYNGITIS: Primary | ICD-10-CM

## 2025-04-23 PROCEDURE — 99999 PR PBB SHADOW E&M-EST. PATIENT-LVL II: CPT | Mod: PBBFAC,,,

## 2025-04-23 RX ADMIN — TRIAMCINOLONE ACETONIDE 60 MG: 40 INJECTION, SUSPENSION INTRA-ARTICULAR; INTRAMUSCULAR at 02:04

## 2025-04-23 NOTE — TELEPHONE ENCOUNTER
Spoke with pt who reports that she  started with sore throat last night, and today began to have left earache. She denies fever, but does reports spitting up green colored phlegm. Advised to be seen today or tomorrow in office. Appointment scheduled.      Reason for Disposition   All other earaches (Exceptions: Brief ear pain lasting < 1 hour, and earache occurring during air travel.)    Additional Information   Negative: Sounds like a life-threatening emergency to the triager   Negative: Pink or red swelling behind the ear   Negative: Stiff neck (can't touch chin to chest)   Negative: Patient sounds very sick or weak to the triager   Negative: SEVERE earache pain (e.g., excruciating)   Negative: Fever > 103 F (39.4 C)   Negative: Ear pain right after long - thin object was inserted into the ear canal (e.g., paper clip, pencil, Q-tip, wire)   Negative: White, yellow, or green discharge (pus)   Negative: Diabetes mellitus or a weak immune system (e.g., HIV positive, cancer chemotherapy, transplant patient)   Negative: Bloody discharge or unexplained bleeding from ear canal   Negative: New blurred vision or vision changes    Protocols used: Earache-A-OH

## 2025-04-24 RX ORDER — TRIAMCINOLONE ACETONIDE 40 MG/ML
60 INJECTION, SUSPENSION INTRA-ARTICULAR; INTRAMUSCULAR
Status: COMPLETED | OUTPATIENT
Start: 2025-04-24 | End: 2025-04-23

## 2025-04-26 RX ORDER — AMOXICILLIN AND CLAVULANATE POTASSIUM 875; 125 MG/1; MG/1
1 TABLET, FILM COATED ORAL EVERY 12 HOURS
Qty: 10 TABLET | Refills: 0 | Status: SHIPPED | OUTPATIENT
Start: 2025-04-26 | End: 2025-05-01

## 2025-04-26 NOTE — PROGRESS NOTES
Family Medicine     Patient name: Adama Ortiz  MRN: [id number]  : 1975  PCP NAME: Trudi Sanchez MD    Active Problem List with Overview Notes    Diagnosis Date Noted    Edema, peripheral 2025    Chronic left-sided low back pain without sciatica 2022    Major depressive disorder, single episode, mild 02/15/2022    BMI 50.0-59.9, adult 2021    Female infertility 10/07/2020    Autonomic dysfunction 2019  Could be chronic dehydration vs. POTS, look into fluids first      Primary hypertension 2017    Hyperprolactinemia 2016    Prediabetes 08/10/2016       History of Present Illness    Patient presents today with sore throat and ear pain    She reports sore throat that started last night, now accompanied by ear pain as a new symptom. She notes greenish mucus production with blood streaks but denies fever. She had similar symptoms a couple months ago which was diagnosed as a bacterial infection.      ROS:  General: -fever, -chills, -fatigue, -weight gain, -weight loss  Eyes: -vision changes, -redness, -discharge  ENT: +ear pain, -nasal congestion, +sore throat  Cardiovascular: -chest pain, -palpitations, -lower extremity edema  Respiratory: -cough, -shortness of breath  Gastrointestinal: -abdominal pain, -nausea, -vomiting, -diarrhea, -constipation, -blood in stool  Genitourinary: -dysuria, -hematuria, -frequency  Musculoskeletal: -joint pain, -muscle pain  Skin: -rash, -lesion  Neurological: -headache, -dizziness, -numbness, -tingling  Psychiatric: -anxiety, -depression, -sleep difficulty          Past Medical History:   Diagnosis Date    Hypertension        Past Surgical History:   Procedure Laterality Date    APPENDECTOMY      BREAST SURGERY      DILATION AND CURETTAGE OF UTERUS      TOTAL REDUCTION MAMMOPLASTY Bilateral         Family History   Problem Relation Name Age of Onset    Hypertension Mother      Hyperlipidemia Mother      COPD Father  Carols Rivera     Diabetes Maternal Grandmother Argelia Garrett         Social History     Socioeconomic History    Marital status:    Tobacco Use    Smoking status: Never    Smokeless tobacco: Never   Substance and Sexual Activity    Alcohol use: Yes     Comment: rarely    Drug use: Never    Sexual activity: Yes     Partners: Male     Birth control/protection: None     Social Drivers of Health     Financial Resource Strain: Low Risk  (12/10/2024)    Overall Financial Resource Strain (CARDIA)     Difficulty of Paying Living Expenses: Not very hard   Food Insecurity: No Food Insecurity (12/10/2024)    Hunger Vital Sign     Worried About Running Out of Food in the Last Year: Never true     Ran Out of Food in the Last Year: Never true   Transportation Needs: No Transportation Needs (11/8/2023)    PRAPARE - Transportation     Lack of Transportation (Medical): No     Lack of Transportation (Non-Medical): No   Physical Activity: Inactive (12/10/2024)    Exercise Vital Sign     Days of Exercise per Week: 0 days     Minutes of Exercise per Session: 0 min   Stress: No Stress Concern Present (12/10/2024)    [employer] - Occupational Stress Questionnaire     Feeling of Stress : Not at all   Housing Stability: Low Risk  (11/8/2023)    Housing Stability Vital Sign     Unable to Pay for Housing in the Last Year: No     Number of Places Lived in the Last Year: 1     Unstable Housing in the Last Year: No       LMP 11/07/2023     Physical Exam    General: No acute distress. Well-developed. Well-nourished.  Eyes: EOMI. Sclerae anicteric.  HENT: Normocephalic. Atraumatic. Nares patent. Moist oral mucosa.  Ears: Bilateral TMs clear. Bilateral EACs clear.  Cardiovascular: Regular rate. Regular rhythm. No murmurs. No rubs. No gallops. Normal S1, S2.  Respiratory: Normal respiratory effort. Clear to auscultation bilaterally. No rales. No rhonchi. No wheezing.  Abdomen: Soft. Non-tender. Non-distended.  Normoactive bowel sounds.  Musculoskeletal: No  obvious deformity.  Extremities: No lower extremity edema.  Neurological: Alert & oriented x3. No slurred speech. Normal gait.  Psychiatric: Normal mood. Normal affect. Good insight. Good judgment.  Skin: Warm. Dry. No rash.            Assessment & Plan      IMPRESSION:  - Considered bacterial infection due to symptoms of sore throat, ear pain, and greenish mucus with blood streaks.  - Considered steroid injection as part of treatment plan.       Diagnoses and all orders for this visit:    Streptococcal pharyngitis  -   Complaints of throat pain, ear pain and greenish mucus with blood streaks.   -     POCT Strep A, Molecular is positive  -     triamcinolone acetonide injection 60 mg  -     Paper prescription for Augmentin sent to pharmacy.          Problem List Items Addressed This Visit    None  Visit Diagnoses         Streptococcal pharyngitis    -  Primary    Relevant Medications    triamcinolone acetonide injection 60 mg (Completed)    amoxicillin-clavulanate 875-125mg (AUGMENTIN) 875-125 mg per tablet    Other Relevant Orders    POCT Strep A, Molecular              Medication List with Changes/Refills   New Medications    AMOXICILLIN-CLAVULANATE 875-125MG (AUGMENTIN) 875-125 MG PER TABLET    Take 1 tablet by mouth every 12 (twelve) hours. for 5 days   Current Medications    AMLODIPINE (NORVASC) 10 MG TABLET    TAKE 1 TABLET(10 MG) BY MOUTH EVERY EVENING    CICLOPIROX (PENLAC) 8 % SOLN    Apply topically nightly.    GABAPENTIN (NEURONTIN) 300 MG CAPSULE    Take 1 capsule (300 mg total) by mouth every evening.    LIDOCAINE (XYLOCAINE) 5 % OINT OINTMENT    Apply topically 2 (two) times a day.    LOSARTAN (COZAAR) 50 MG TABLET    Take 1 tablet (50 mg total) by mouth once daily.    SPIRONOLACTONE (ALDACTONE) 25 MG TABLET    TAKE 1 TABLET(25 MG) BY MOUTH DAILY    TRIAMCINOLONE ACETONIDE 0.1% (KENALOG) 0.1 % OINTMENT    Apply topically 4 (four) times daily.         No  follow-ups on file.    Len Jaeger MD        This note was generated with the assistance of ambient listening technology. Verbal consent was obtained by the patient and accompanying visitor(s) for the recording of patient appointment to facilitate this note. I attest to having reviewed and edited the generated note for accuracy, though some syntax or spelling errors may persist. Please contact the author of this note for any clarification.

## 2025-04-30 DIAGNOSIS — I10 PRIMARY HYPERTENSION: ICD-10-CM

## 2025-04-30 DIAGNOSIS — R73.03 PREDIABETES: ICD-10-CM

## 2025-05-01 ENCOUNTER — OFFICE VISIT (OUTPATIENT)
Dept: FAMILY MEDICINE | Facility: CLINIC | Age: 50
End: 2025-05-01
Payer: COMMERCIAL

## 2025-05-01 ENCOUNTER — RESULTS FOLLOW-UP (OUTPATIENT)
Dept: FAMILY MEDICINE | Facility: CLINIC | Age: 50
End: 2025-05-01

## 2025-05-01 ENCOUNTER — LAB VISIT (OUTPATIENT)
Dept: LAB | Facility: HOSPITAL | Age: 50
End: 2025-05-01
Attending: INTERNAL MEDICINE
Payer: COMMERCIAL

## 2025-05-01 VITALS
TEMPERATURE: 98 F | OXYGEN SATURATION: 95 % | BODY MASS INDEX: 50.06 KG/M2 | HEIGHT: 62 IN | WEIGHT: 272.06 LBS | SYSTOLIC BLOOD PRESSURE: 128 MMHG | DIASTOLIC BLOOD PRESSURE: 86 MMHG | HEART RATE: 74 BPM

## 2025-05-01 DIAGNOSIS — L98.9 SKIN LESION OF LOWER EXTREMITY: ICD-10-CM

## 2025-05-01 DIAGNOSIS — Z00.00 ANNUAL PHYSICAL EXAM: ICD-10-CM

## 2025-05-01 DIAGNOSIS — Z12.31 ENCOUNTER FOR SCREENING MAMMOGRAM FOR BREAST CANCER: ICD-10-CM

## 2025-05-01 DIAGNOSIS — Z00.00 ANNUAL PHYSICAL EXAM: Primary | ICD-10-CM

## 2025-05-01 LAB
25(OH)D3+25(OH)D2 SERPL-MCNC: 14 NG/ML (ref 30–96)
ABSOLUTE EOSINOPHIL (OHS): 0.2 K/UL
ABSOLUTE MONOCYTE (OHS): 0.41 K/UL (ref 0.3–1)
ABSOLUTE NEUTROPHIL COUNT (OHS): 2.46 K/UL (ref 1.8–7.7)
ALBUMIN SERPL BCP-MCNC: 3.7 G/DL (ref 3.5–5.2)
ALP SERPL-CCNC: 81 UNIT/L (ref 40–150)
ALT SERPL W/O P-5'-P-CCNC: 31 UNIT/L (ref 10–44)
ANION GAP (OHS): 9 MMOL/L (ref 8–16)
AST SERPL-CCNC: 29 UNIT/L (ref 11–45)
BASOPHILS # BLD AUTO: 0.03 K/UL
BASOPHILS NFR BLD AUTO: 0.5 %
BILIRUB SERPL-MCNC: 0.6 MG/DL (ref 0.1–1)
BUN SERPL-MCNC: 9 MG/DL (ref 6–20)
CALCIUM SERPL-MCNC: 9.1 MG/DL (ref 8.7–10.5)
CHLORIDE SERPL-SCNC: 108 MMOL/L (ref 95–110)
CHOLEST SERPL-MCNC: 181 MG/DL (ref 120–199)
CHOLEST/HDLC SERPL: 3.9 {RATIO} (ref 2–5)
CO2 SERPL-SCNC: 26 MMOL/L (ref 23–29)
CREAT SERPL-MCNC: 0.8 MG/DL (ref 0.5–1.4)
EAG (OHS): 126 MG/DL (ref 68–131)
ERYTHROCYTE [DISTWIDTH] IN BLOOD BY AUTOMATED COUNT: 12.8 % (ref 11.5–14.5)
GFR SERPLBLD CREATININE-BSD FMLA CKD-EPI: >60 ML/MIN/1.73/M2
GLUCOSE SERPL-MCNC: 90 MG/DL (ref 70–110)
HBA1C MFR BLD: 6 % (ref 4–5.6)
HCT VFR BLD AUTO: 41.9 % (ref 37–48.5)
HDLC SERPL-MCNC: 46 MG/DL (ref 40–75)
HDLC SERPL: 25.4 % (ref 20–50)
HGB BLD-MCNC: 13.8 GM/DL (ref 12–16)
IMM GRANULOCYTES # BLD AUTO: 0.01 K/UL (ref 0–0.04)
IMM GRANULOCYTES NFR BLD AUTO: 0.2 % (ref 0–0.5)
LDLC SERPL CALC-MCNC: 116 MG/DL (ref 63–159)
LYMPHOCYTES # BLD AUTO: 3.08 K/UL (ref 1–4.8)
MCH RBC QN AUTO: 28.9 PG (ref 27–31)
MCHC RBC AUTO-ENTMCNC: 32.9 G/DL (ref 32–36)
MCV RBC AUTO: 88 FL (ref 82–98)
NONHDLC SERPL-MCNC: 135 MG/DL
NUCLEATED RBC (/100WBC) (OHS): 0 /100 WBC
PLATELET # BLD AUTO: 304 K/UL (ref 150–450)
PMV BLD AUTO: 10.3 FL (ref 9.2–12.9)
POTASSIUM SERPL-SCNC: 3.9 MMOL/L (ref 3.5–5.1)
PROT SERPL-MCNC: 7.7 GM/DL (ref 6–8.4)
RBC # BLD AUTO: 4.78 M/UL (ref 4–5.4)
RELATIVE EOSINOPHIL (OHS): 3.2 %
RELATIVE LYMPHOCYTE (OHS): 49.8 % (ref 18–48)
RELATIVE MONOCYTE (OHS): 6.6 % (ref 4–15)
RELATIVE NEUTROPHIL (OHS): 39.7 % (ref 38–73)
SODIUM SERPL-SCNC: 143 MMOL/L (ref 136–145)
TRIGL SERPL-MCNC: 95 MG/DL (ref 30–150)
TSH SERPL-ACNC: 2.48 UIU/ML (ref 0.4–4)
WBC # BLD AUTO: 6.19 K/UL (ref 3.9–12.7)

## 2025-05-01 PROCEDURE — 36415 COLL VENOUS BLD VENIPUNCTURE: CPT | Mod: PO

## 2025-05-01 PROCEDURE — 82040 ASSAY OF SERUM ALBUMIN: CPT

## 2025-05-01 PROCEDURE — 1159F MED LIST DOCD IN RCRD: CPT | Mod: CPTII,S$GLB,, | Performed by: INTERNAL MEDICINE

## 2025-05-01 PROCEDURE — 99396 PREV VISIT EST AGE 40-64: CPT | Mod: S$GLB,,, | Performed by: INTERNAL MEDICINE

## 2025-05-01 PROCEDURE — 84443 ASSAY THYROID STIM HORMONE: CPT

## 2025-05-01 PROCEDURE — 83036 HEMOGLOBIN GLYCOSYLATED A1C: CPT

## 2025-05-01 PROCEDURE — 1160F RVW MEDS BY RX/DR IN RCRD: CPT | Mod: CPTII,S$GLB,, | Performed by: INTERNAL MEDICINE

## 2025-05-01 PROCEDURE — 99999 PR PBB SHADOW E&M-EST. PATIENT-LVL IV: CPT | Mod: PBBFAC,,, | Performed by: INTERNAL MEDICINE

## 2025-05-01 PROCEDURE — 82306 VITAMIN D 25 HYDROXY: CPT

## 2025-05-01 PROCEDURE — 3008F BODY MASS INDEX DOCD: CPT | Mod: CPTII,S$GLB,, | Performed by: INTERNAL MEDICINE

## 2025-05-01 PROCEDURE — 85025 COMPLETE CBC W/AUTO DIFF WBC: CPT

## 2025-05-01 PROCEDURE — 3079F DIAST BP 80-89 MM HG: CPT | Mod: CPTII,S$GLB,, | Performed by: INTERNAL MEDICINE

## 2025-05-01 PROCEDURE — 4010F ACE/ARB THERAPY RXD/TAKEN: CPT | Mod: CPTII,S$GLB,, | Performed by: INTERNAL MEDICINE

## 2025-05-01 PROCEDURE — 3074F SYST BP LT 130 MM HG: CPT | Mod: CPTII,S$GLB,, | Performed by: INTERNAL MEDICINE

## 2025-05-01 PROCEDURE — 82465 ASSAY BLD/SERUM CHOLESTEROL: CPT

## 2025-05-01 NOTE — PROGRESS NOTES
SUBJECTIVE     Chief Complaint   Patient presents with    Annual Exam       HPI  Adama Ortiz is a 49 y.o. female with multiple medical diagnoses as listed in the medical history and problem list that presents for annual exam. Pt has been doing well since her last visit, but is getting over strep. She has a good appetite and eats well. She does not exercise. She sleeps for ~6 hours nightly. Pt does not take OTC supplements. She does have any current stressors and does not have an outlet. Pt is not UTD on age appropriate CA screening.    PAST MEDICAL HISTORY:  Past Medical History:   Diagnosis Date    Fever blister 11/28/2020    Hypertension     Keloid cicatrix 7/2006    After breast reduction       PAST SURGICAL HISTORY:  Past Surgical History:   Procedure Laterality Date    APPENDECTOMY      BREAST SURGERY      DILATION AND CURETTAGE OF UTERUS      TOTAL REDUCTION MAMMOPLASTY Bilateral 2006       SOCIAL HISTORY:  Social History[1]    FAMILY HISTORY:  Family History   Problem Relation Name Age of Onset    Hypertension Mother Madonna Rivera     Hyperlipidemia Mother Madonna Rivera     COPD Father Carlos Rivera     Diabetes Maternal Grandmother Argelia Mingowang     Miscarriages / Stillbirths Maternal Grandmother Argelia Mingowang     Alcohol abuse Maternal Aunt Radha Jeovanny     Arthritis Maternal Aunt Ines Mcmahon        ALLERGIES AND MEDICATIONS: updated and reviewed.  Review of patient's allergies indicates:   Allergen Reactions    Aspirin Swelling    Keflex [cephalexin] Swelling and Rash     Current Medications[2]    ROS  Review of Systems   Constitutional:  Negative for chills and fever.   HENT:  Negative for hearing loss and sore throat.    Eyes:  Negative for visual disturbance.   Respiratory:  Positive for cough and chest tightness. Negative for shortness of breath.    Cardiovascular:  Negative for chest pain, palpitations and leg swelling.   Gastrointestinal:  Negative for abdominal pain, constipation,  "diarrhea, nausea and vomiting.   Genitourinary:  Negative for dysuria, frequency and urgency.   Musculoskeletal:  Positive for arthralgias (B/L hips). Negative for joint swelling and myalgias.   Skin:  Negative for rash and wound.   Neurological:  Negative for headaches.   Psychiatric/Behavioral:  Negative for agitation and confusion. The patient is not nervous/anxious.          OBJECTIVE     Physical Exam  Vitals:    05/01/25 0829   BP: 128/86   Pulse: 74   Temp: 97.8 °F (36.6 °C)    Body mass index is 49.76 kg/m².  Weight: 123.4 kg (272 lb 0.8 oz)   Height: 5' 2" (157.5 cm)     Physical Exam  Constitutional:       General: She is not in acute distress.     Appearance: She is well-developed.   HENT:      Head: Normocephalic and atraumatic.      Right Ear: External ear normal.      Left Ear: External ear normal.      Nose: Nose normal.   Eyes:      General: No scleral icterus.        Right eye: No discharge.         Left eye: No discharge.      Conjunctiva/sclera: Conjunctivae normal.   Neck:      Vascular: No JVD.      Trachea: No tracheal deviation.   Cardiovascular:      Rate and Rhythm: Normal rate and regular rhythm.      Heart sounds: No murmur heard.     No friction rub. No gallop.   Pulmonary:      Effort: Pulmonary effort is normal. No respiratory distress.      Breath sounds: Normal breath sounds. No wheezing.   Abdominal:      General: Bowel sounds are normal. There is no distension.      Palpations: Abdomen is soft. There is no mass.      Tenderness: There is no abdominal tenderness. There is no guarding or rebound.   Musculoskeletal:         General: No tenderness or deformity. Normal range of motion.      Cervical back: Normal range of motion and neck supple.   Skin:     General: Skin is warm and dry.      Findings: No erythema or rash.      Comments: Raised skin lesion to R lateral ankle with hyperpigmented borders   Neurological:      Mental Status: She is alert and oriented to person, place, and " time.      Motor: No abnormal muscle tone.      Coordination: Coordination normal.   Psychiatric:         Behavior: Behavior normal.         Thought Content: Thought content normal.         Judgment: Judgment normal.           Health Maintenance         Date Due Completion Date    Colorectal Cancer Screening 10/11/2023 10/11/2022    COVID-19 Vaccine (4 - 2024-25 season) 09/01/2024 10/13/2021    Hemoglobin A1c (Prediabetes) 03/18/2025 3/18/2024    Lipid Panel 03/18/2025 3/18/2024    Mammogram 06/24/2025 6/24/2024    Cervical Cancer Screening 02/09/2027 2/9/2022    TETANUS VACCINE 09/26/2032 9/26/2022    RSV Vaccine (Age 60+ and Pregnant patients) (1 - 1-dose 75+ series) 07/30/2050 ---              ASSESSMENT     49 y.o. female with     1. Annual physical exam    2. BMI 45.0-49.9, adult    3. Skin lesion of lower extremity    4. Encounter for screening mammogram for breast cancer        PLAN:     1. Annual physical exam  - Counseled on age appropriate medical preventative services, including age appropriate cancer screenings, over all nutritional health, need for a consistent exercise regimen and an over all push towards maintaining a vigorous and active lifestyle.  Counseled on age appropriate vaccines and discussed upcoming health care needs based on age/gender.  Spent time with patient counseling on need for a good patient/doctor relationship moving forward.  Discussed use of common OTC medications and supplements.  Discussed common dietary aids and use of caffeine and the need for good sleep hygiene and stress management.  - CBC Auto Differential; Future  - Comprehensive Metabolic Panel; Future  - Hemoglobin A1C; Future  - TSH; Future  - Lipid Panel; Future  - Vitamin D; Future    2. BMI 45.0-49.9, adult  - Pt aware of importance of eating a prudent diet and exercising    3. Skin lesion of lower extremity  - Ambulatory referral/consult to Dermatology; Future    4. Encounter for screening mammogram for breast  cancer  - Mammo Digital Screening Bilat w/ Bacilio (XPD); Future        RTC in 1 year     Trudi Sanchez MD  05/01/2025 8:53 AM        No follow-ups on file.                       [1]   Social History  Socioeconomic History    Marital status:    Tobacco Use    Smoking status: Never    Smokeless tobacco: Never   Substance and Sexual Activity    Alcohol use: Yes     Comment: rarely    Drug use: Never    Sexual activity: Yes     Partners: Male     Birth control/protection: None     Social Drivers of Health     Financial Resource Strain: Low Risk  (12/10/2024)    Overall Financial Resource Strain (CARDIA)     Difficulty of Paying Living Expenses: Not very hard   Food Insecurity: No Food Insecurity (12/10/2024)    Hunger Vital Sign     Worried About Running Out of Food in the Last Year: Never true     Ran Out of Food in the Last Year: Never true   Transportation Needs: No Transportation Needs (11/8/2023)    PRAPARE - Transportation     Lack of Transportation (Medical): No     Lack of Transportation (Non-Medical): No   Physical Activity: Inactive (12/10/2024)    Exercise Vital Sign     Days of Exercise per Week: 0 days     Minutes of Exercise per Session: 0 min   Stress: No Stress Concern Present (12/10/2024)    [employer] - Occupational Stress Questionnaire     Feeling of Stress : Not at all   Housing Stability: Low Risk  (11/8/2023)    Housing Stability Vital Sign     Unable to Pay for Housing in the Last Year: No     Number of Places Lived in the Last Year: 1     Unstable Housing in the Last Year: No   [2]   Current Outpatient Medications   Medication Sig Dispense Refill    amLODIPine (NORVASC) 10 MG tablet TAKE 1 TABLET(10 MG) BY MOUTH EVERY EVENING 90 tablet 2    ciclopirox (PENLAC) 8 % Soln Apply topically nightly. 6.6 mL 3    LIDOcaine (XYLOCAINE) 5 % Oint ointment Apply topically 2 (two) times a day. 240 g 3    losartan (COZAAR) 50 MG tablet Take 1 tablet (50 mg total) by mouth  once daily. 90 tablet 3    spironolactone (ALDACTONE) 25 MG tablet TAKE 1 TABLET(25 MG) BY MOUTH DAILY 90 tablet 1    triamcinolone acetonide 0.1% (KENALOG) 0.1 % ointment Apply topically 4 (four) times daily. 454 g 2    amoxicillin-clavulanate 875-125mg (AUGMENTIN) 875-125 mg per tablet Take 1 tablet by mouth every 12 (twelve) hours. for 5 days (Patient not taking: Reported on 5/1/2025) 10 tablet 0    gabapentin (NEURONTIN) 300 MG capsule Take 1 capsule (300 mg total) by mouth every evening. (Patient not taking: Reported on 8/6/2024) 30 capsule 0     No current facility-administered medications for this visit.

## 2025-05-21 NOTE — H&P
Short Stay Endoscopy History and Physical    PCP - Trudi Sanchez MD    Procedure - Colonoscopy  ASA - per anesthesia  Mallampati - per anesthesia  History of Anesthesia problems - no  Family history Anesthesia problems - no   Plan of anesthesia - General    HPI:  This is a 49 y.o. female here for evaluation of : asymptomatic screening exam      ROS:  Constitutional: No fevers, chills, No weight loss  CV: No chest pain  Pulm: No cough, No shortness of breath  GI: see HPI  Derm: No rash    Medical History:  has a past medical history of Fever blister (11/28/2020), Hypertension, and Keloid cicatrix (7/2006).    Surgical History:  has a past surgical history that includes Breast surgery; Appendectomy; Dilation and curettage of uterus; and Total Reduction Mammoplasty (Bilateral, 2006).    Family History: family history includes Alcohol abuse in her maternal aunt; Arthritis in her maternal aunt; COPD in her father; Diabetes in her maternal grandmother; Hyperlipidemia in her mother; Hypertension in her mother; Miscarriages / Stillbirths in her maternal grandmother.. Otherwise no colon cancer, inflammatory bowel disease, or GI malignancies.    Social History:  reports that she has never smoked. She has never used smokeless tobacco. She reports current alcohol use. She reports that she does not use drugs.    Review of patient's allergies indicates:   Allergen Reactions    Aspirin Swelling    Keflex [cephalexin] Swelling and Rash       Medications:   Prescriptions Prior to Admission[1]      Physical Exam:    Vital Signs: There were no vitals filed for this visit.    Gen: NAD, lying comfortably  HENT: NCAT, oropharynx clear  Eyes: anicteric sclerae, EOMI grossly  Neck: supple, no visible masses/goiter  Cardiac: RRR  Lungs: non-labored breathing  Abd: soft, NT/ND, normoactive BS  Ext: no LE edema, warm, well perfused  Skin: skin intact on exposed body parts, no visible rashes, lesions  Neuro: A&Ox4, neuro exam grossly  intact, moves all extremities  Psych: appropriate mood, affect        Labs:  Lab Results   Component Value Date    WBC 6.19 05/01/2025    HGB 13.8 05/01/2025    HCT 41.9 05/01/2025     05/01/2025    CHOL 181 05/01/2025    TRIG 95 05/01/2025    HDL 46 05/01/2025    ALT 31 05/01/2025    AST 29 05/01/2025     05/01/2025    K 3.9 05/01/2025     05/01/2025    CREATININE 0.8 05/01/2025    BUN 9 05/01/2025    CO2 26 05/01/2025    TSH 2.484 05/01/2025    INR 1.0 04/16/2009    HGBA1C 6.0 (H) 05/01/2025       Plan:  Colonoscopy for colon cancer screening    I have explained the risks and benefits of endoscopy procedures to the patient including but not limited to bleeding, perforation, infection, and death.  The patient was asked if they understand and allowed to ask any further questions to their satisfaction.    Hina Marie MD         [1]   No medications prior to admission.

## 2025-05-22 ENCOUNTER — RESULTS FOLLOW-UP (OUTPATIENT)
Dept: FAMILY MEDICINE | Facility: CLINIC | Age: 50
End: 2025-05-22

## 2025-05-22 ENCOUNTER — HOSPITAL ENCOUNTER (OUTPATIENT)
Facility: HOSPITAL | Age: 50
Discharge: HOME | End: 2025-05-22
Attending: INTERNAL MEDICINE | Admitting: INTERNAL MEDICINE
Payer: COMMERCIAL

## 2025-05-22 ENCOUNTER — ANESTHESIA EVENT (OUTPATIENT)
Dept: ENDOSCOPY | Facility: HOSPITAL | Age: 50
End: 2025-05-22
Payer: COMMERCIAL

## 2025-05-22 ENCOUNTER — ANESTHESIA (OUTPATIENT)
Dept: ENDOSCOPY | Facility: HOSPITAL | Age: 50
End: 2025-05-22
Payer: COMMERCIAL

## 2025-05-22 VITALS
WEIGHT: 272 LBS | DIASTOLIC BLOOD PRESSURE: 76 MMHG | TEMPERATURE: 98 F | HEIGHT: 62 IN | OXYGEN SATURATION: 100 % | SYSTOLIC BLOOD PRESSURE: 136 MMHG | HEART RATE: 67 BPM | BODY MASS INDEX: 50.05 KG/M2 | RESPIRATION RATE: 18 BRPM

## 2025-05-22 DIAGNOSIS — Z12.11 COLON CANCER SCREENING: Primary | ICD-10-CM

## 2025-05-22 PROCEDURE — G0121 COLON CA SCRN NOT HI RSK IND: HCPCS | Mod: ,,, | Performed by: INTERNAL MEDICINE

## 2025-05-22 PROCEDURE — G0121 COLON CA SCRN NOT HI RSK IND: HCPCS | Performed by: INTERNAL MEDICINE

## 2025-05-22 PROCEDURE — 25000003 PHARM REV CODE 250: Performed by: STUDENT IN AN ORGANIZED HEALTH CARE EDUCATION/TRAINING PROGRAM

## 2025-05-22 PROCEDURE — 37000008 HC ANESTHESIA 1ST 15 MINUTES: Performed by: INTERNAL MEDICINE

## 2025-05-22 PROCEDURE — 37000009 HC ANESTHESIA EA ADD 15 MINS: Performed by: INTERNAL MEDICINE

## 2025-05-22 PROCEDURE — 63600175 PHARM REV CODE 636 W HCPCS: Performed by: STUDENT IN AN ORGANIZED HEALTH CARE EDUCATION/TRAINING PROGRAM

## 2025-05-22 RX ORDER — LIDOCAINE HYDROCHLORIDE 20 MG/ML
INJECTION, SOLUTION EPIDURAL; INFILTRATION; INTRACAUDAL; PERINEURAL
Status: DISCONTINUED
Start: 2025-05-22 | End: 2025-05-22 | Stop reason: HOSPADM

## 2025-05-22 RX ORDER — LIDOCAINE HYDROCHLORIDE 20 MG/ML
INJECTION INTRAVENOUS
Status: DISCONTINUED | OUTPATIENT
Start: 2025-05-22 | End: 2025-05-22

## 2025-05-22 RX ORDER — PROPOFOL 10 MG/ML
VIAL (ML) INTRAVENOUS
Status: DISCONTINUED | OUTPATIENT
Start: 2025-05-22 | End: 2025-05-22

## 2025-05-22 RX ORDER — PROPOFOL 10 MG/ML
VIAL (ML) INTRAVENOUS
Status: DISCONTINUED
Start: 2025-05-22 | End: 2025-05-22 | Stop reason: HOSPADM

## 2025-05-22 RX ADMIN — PROPOFOL 50 MG: 10 INJECTION, EMULSION INTRAVENOUS at 08:05

## 2025-05-22 RX ADMIN — PROPOFOL 30 MG: 10 INJECTION, EMULSION INTRAVENOUS at 08:05

## 2025-05-22 RX ADMIN — LIDOCAINE HYDROCHLORIDE 100 MG: 20 INJECTION, SOLUTION INTRAVENOUS at 08:05

## 2025-05-22 RX ADMIN — PROPOFOL 70 MG: 10 INJECTION, EMULSION INTRAVENOUS at 08:05

## 2025-05-22 RX ADMIN — SODIUM CHLORIDE: 0.9 INJECTION, SOLUTION INTRAVENOUS at 08:05

## 2025-05-22 NOTE — TRANSFER OF CARE
"Anesthesia Transfer of Care Note    Patient: Adama Ortiz    Procedure(s) Performed: Procedure(s) (LRB):  COLONOSCOPY, SCREENING, LOW RISK PATIENT (N/A)    Patient location: GI    Anesthesia Type: general    Transport from OR: Transported from OR on room air with adequate spontaneous ventilation    Post pain: adequate analgesia    Post assessment: no apparent anesthetic complications and tolerated procedure well    Post vital signs: stable    Level of consciousness: awake    Nausea/Vomiting: no nausea/vomiting    Complications: none    Transfer of care protocol was followed      Last vitals: Visit Vitals  /65 (BP Location: Left arm, Patient Position: Lying)   Pulse 83   Temp 36.5 °C (97.7 °F) (Oral)   Resp 16   Ht 5' 2" (1.575 m)   Wt 123.4 kg (272 lb)   LMP 11/07/2023   SpO2 97%   Breastfeeding No   BMI 49.75 kg/m²     "

## 2025-05-22 NOTE — ANESTHESIA PREPROCEDURE EVALUATION
05/22/2025  Adama Ortiz is a 49 y.o., female.      Pre-op Assessment    I have reviewed the Patient Summary Reports.     I have reviewed the Nursing Notes. I have reviewed the NPO Status.   I have reviewed the Medications.     Review of Systems  Anesthesia Hx:  No problems with previous Anesthesia             Denies Family Hx of Anesthesia complications.    Denies Personal Hx of Anesthesia complications.                    Social:  Social Alcohol Use, Non-Smoker       Hematology/Oncology:  Hematology Normal   Oncology Normal                                   EENT/Dental:  EENT/Dental Normal           Cardiovascular:     Hypertension                                          Pulmonary:  Pulmonary Normal                       Hepatic/GI:  Hepatic/GI Normal                    Endocrine:        Morbid Obesity / BMI > 40  Psych:    depression                Physical Exam  General: Alert, Cooperative and Oriented    Airway:  Mallampati: II   Mouth Opening: Normal  TM Distance: Normal  Tongue: Normal  Neck ROM: Normal ROM    Dental:  Intact        Anesthesia Plan  Type of Anesthesia, risks & benefits discussed:    Anesthesia Type: Gen Supraglottic Airway  Intra-op Monitoring Plan: Standard ASA Monitors  Induction:  IV  Informed Consent: Informed consent signed with the Patient and all parties understand the risks and agree with anesthesia plan.  All questions answered. Patient consented to blood products? No  ASA Score: 3    Ready For Surgery From Anesthesia Perspective.     .

## 2025-05-22 NOTE — PROVATION PATIENT INSTRUCTIONS
Discharge Summary/Instructions after an Endoscopic Procedure  Patient Name: Adama Ortiz  Patient MRN: [id number]  Patient YOB: 1975  Thursday, May 22, 2025  Hina Marie MD  Dear patient,  As a result of recent federal legislation (The Federal Cures Act), you may   receive lab or pathology results from your procedure in your [facility]   account before your physician is able to contact you. Your physician or   their representative will relay the results to you with their   recommendations at their soonest availability.  Thank you,  RESTRICTIONS:  During your procedure today, you received medications for sedation.  These   medications may affect your judgment, balance and coordination.  Therefore,   for 24 hours, you have the following restrictions:   - DO NOT drive a car, operate machinery, make legal/financial decisions,   sign important papers or drink alcohol.    ACTIVITY:  Today: no heavy lifting, straining or running due to procedural   sedation/anesthesia.  The following day: return to full activity including work.  DIET:  Eat and drink normally unless instructed otherwise.     TREATMENT FOR COMMON SIDE EFFECTS:  - Mild abdominal pain, nausea, belching, bloating or excessive gas:  rest,   eat lightly and use a heating pad.  - Sore Throat: treat with throat lozenges and/or gargle with warm salt   water.  - Because air was used during the procedure, expelling large amounts of air   from your rectum or belching is normal.  - If a bowel prep was taken, you may not have a bowel movement for 1-3 days.    This is normal.  SYMPTOMS TO WATCH FOR AND REPORT TO YOUR PHYSICIAN:  1. Abdominal pain or bloating, other than gas cramps.  2. Chest pain.  3. Back pain.  4. Signs of infection such as: chills or fever occurring within 24 hours   after the procedure.  5. Rectal bleeding, which would show as bright red, maroon, or black stools.   (A tablespoon of blood from the rectum is not serious, especially if    hemorrhoids are present.)  6. Vomiting.  7. Weakness or dizziness.  GO DIRECTLY TO THE NEAREST EMERGENCY ROOM IF YOU HAVE ANY OF THE FOLLOWING:      Difficulty breathing              Chills and/or fever over 101 F   Persistent vomiting and/or vomiting blood   Severe abdominal pain   Severe chest pain   Black, tarry stools   Bleeding- more than one tablespoon   Any other symptom or condition that you feel may need urgent attention  Your doctor recommends these additional instructions:  If any biopsies were taken, your doctors clinic will contact you in 1 to 2   weeks with any results.  - Discharge patient to home.   - Resume previous diet.   - Continue present medications.   - Repeat colonoscopy in 10 years for screening purposes.  For questions, problems or results please call your physician - Hina Marie MD at Work:  [phone].  [facility] can be reached at [phone]     IF A COMPLICATION OR EMERGENCY SITUATION ARISES AND YOU ARE UNABLE TO REACH   YOUR PHYSICIAN - GO DIRECTLY TO THE EMERGENCY ROOM.  Hina Marie MD  5/22/2025 8:50:02 AM  This report has been verified and signed electronically.  Dear patient,  As a result of recent federal legislation (The Federal Cures Act), you may   receive lab or pathology results from your procedure in your [facility]   account before your physician is able to contact you. Your physician or   their representative will relay the results to you with their   recommendations at their soonest availability.  Thank you,  PROVATION

## 2025-05-22 NOTE — ANESTHESIA POSTPROCEDURE EVALUATION
Anesthesia Post Evaluation    Patient: Adama Ortiz    Procedure(s) Performed: Procedure(s) (LRB):  COLONOSCOPY, SCREENING, LOW RISK PATIENT (N/A)    Final Anesthesia Type: general      Patient location during evaluation: GI PACU  Patient participation: Yes- Able to Participate  Level of consciousness: awake and alert  Post-procedure vital signs: reviewed and stable  Airway patency: patent    PONV status at discharge: No PONV  Anesthetic complications: no      Cardiovascular status: blood pressure returned to baseline and hemodynamically stable  Respiratory status: unassisted, spontaneous ventilation and room air  Hydration status: euvolemic  Follow-up not needed.              Vitals Value Taken Time   /72 05/22/25 09:00   Temp 36.5 °C (97.7 °F) 05/22/25 08:45   Pulse 70 05/22/25 09:00   Resp 18 05/22/25 09:00   SpO2 99 % 05/22/25 09:00         No case tracking events are documented in the log.      Pain/Shaun Score: Shaun Score: 10 (5/22/2025  9:00 AM)

## 2025-06-25 ENCOUNTER — HOSPITAL ENCOUNTER (OUTPATIENT)
Dept: RADIOLOGY | Facility: HOSPITAL | Age: 50
Discharge: HOME | End: 2025-06-25
Attending: INTERNAL MEDICINE
Payer: COMMERCIAL

## 2025-06-25 DIAGNOSIS — Z12.31 ENCOUNTER FOR SCREENING MAMMOGRAM FOR BREAST CANCER: ICD-10-CM

## 2025-06-25 PROCEDURE — 77067 SCR MAMMO BI INCL CAD: CPT | Mod: TC

## 2025-07-11 ENCOUNTER — OFFICE VISIT (OUTPATIENT)
Dept: FAMILY MEDICINE | Facility: CLINIC | Age: 50
End: 2025-07-11
Payer: COMMERCIAL

## 2025-07-11 VITALS
WEIGHT: 272.25 LBS | HEIGHT: 62 IN | SYSTOLIC BLOOD PRESSURE: 144 MMHG | DIASTOLIC BLOOD PRESSURE: 90 MMHG | TEMPERATURE: 98 F | HEART RATE: 70 BPM | BODY MASS INDEX: 50.1 KG/M2 | OXYGEN SATURATION: 98 %

## 2025-07-11 DIAGNOSIS — N39.42 URINARY INCONTINENCE WITHOUT SENSORY AWARENESS: Primary | ICD-10-CM

## 2025-07-11 DIAGNOSIS — I10 PRIMARY HYPERTENSION: ICD-10-CM

## 2025-07-11 DIAGNOSIS — N81.10 VAGINAL PROLAPSE: ICD-10-CM

## 2025-07-11 DIAGNOSIS — E55.9 VITAMIN D DEFICIENCY: ICD-10-CM

## 2025-07-11 PROBLEM — E66.9 OBESITY: Status: ACTIVE | Noted: 2025-07-11

## 2025-07-11 PROCEDURE — 99999 PR PBB SHADOW E&M-EST. PATIENT-LVL IV: CPT | Mod: PBBFAC,,, | Performed by: NURSE PRACTITIONER

## 2025-07-11 NOTE — PROGRESS NOTES
"Subjective:       Patient ID: Adama Ortiz is a 49 y.o. female.    Chief Complaint: Vaginal Prolapse and Urinary Incontinence    HPI    Adama Ortiz is a 49 y.o. female patient that presents to clinic with a chief complaint of vaginal symptoms. Past medical and surgical history reviewed as listed. PCP is Trudi Sanchez MD , she is new to me. Patient reports history of vaginal prolapse diagnosed by gynecology. She notes three episodes of urinary incontinence while sleeping.  Reports she has been taking amlodipine and losartan at night time and aldactone sometimes in the morning. Notes nocturia and frequency with amlodipine.     After urination states she has sensation of "pushing". Denies dysuria.     11/24 vaginal US/pelvic- uterine fibroid noted 2.8 cm.   Hypertension  This is a new problem. The problem is uncontrolled. Pertinent negatives include no headaches, palpitations or peripheral edema. Risk factors for coronary artery disease include obesity. Past treatments include calcium channel blockers, diuretics and angiotensin blockers. The current treatment provides moderate improvement.         ROS as listed.   Past Medical History:   Diagnosis Date    Fever blister 11/28/2020    Hypertension     Keloid cicatrix 7/2006    After breast reduction      Past Surgical History:   Procedure Laterality Date    APPENDECTOMY      BREAST SURGERY      COLONOSCOPY, SCREENING, LOW RISK PATIENT N/A 5/22/2025    Procedure: COLONOSCOPY, SCREENING, LOW RISK PATIENT;  Surgeon: Hina Marie MD;  Location: [facility];  Service: Endoscopy;  Laterality: N/A;  Ref Trudi [facility]    DILATION AND CURETTAGE OF UTERUS      TOTAL REDUCTION MAMMOPLASTY Bilateral 2006      Family History   Problem Relation Name Age of Onset    Hypertension Mother Madonna Rivera     Hyperlipidemia Mother Madonna Rivera     COPD Father Carlos Rivera     Diabetes Maternal Grandmother Argelia Mingowang     Miscarriages / Stillbirths " "Maternal Grandmother Argelia Vignwang     Alcohol abuse Maternal Aunt Radha Up     Arthritis Maternal Aunt Ines Mcmahon       Review of patient's allergies indicates:   Allergen Reactions    Aspirin Swelling    Keflex [cephalexin] Swelling and Rash     Review of Systems   Cardiovascular:  Negative for palpitations.   Neurological:  Negative for headaches.       Objective:      Vitals:    07/11/25 1041   BP: (!) 144/90   Pulse: 70   Temp: 97.7 °F (36.5 °C)   TempSrc: Oral   SpO2: 98%   Weight: 123.5 kg (272 lb 4.3 oz)   Height: 5' 2" (1.575 m)      Physical Exam  Vitals and nursing note reviewed.   Constitutional:       General: She is not in acute distress.     Appearance: She is obese.   HENT:      Head: Normocephalic.   Eyes:      Conjunctiva/sclera: Conjunctivae normal.      Pupils: Pupils are equal, round, and reactive to light.   Pulmonary:      Effort: Pulmonary effort is normal. No respiratory distress.   Musculoskeletal:      Cervical back: Normal range of motion.   Skin:     General: Skin is warm and dry.   Neurological:      Mental Status: She is alert and oriented to person, place, and time.      Gait: Gait normal.   Psychiatric:         Mood and Affect: Mood normal.         Behavior: Behavior normal.         Lab Results   Component Value Date    WBC 6.19 05/01/2025    HGB 13.8 05/01/2025    HCT 41.9 05/01/2025     05/01/2025    CHOL 181 05/01/2025    TRIG 95 05/01/2025    HDL 46 05/01/2025    ALT 31 05/01/2025    AST 29 05/01/2025     05/01/2025    K 3.9 05/01/2025     05/01/2025    CREATININE 0.8 05/01/2025    BUN 9 05/01/2025    CO2 26 05/01/2025    TSH 2.484 05/01/2025    INR 1.0 04/16/2009    HGBA1C 6.0 (H) 05/01/2025      Assessment:       1. Urinary incontinence without sensory awareness    2. Vitamin D deficiency    3. Primary hypertension    4. Vaginal prolapse        Plan:       Urinary incontinence without sensory awareness  Avoid amlodipine and spironolactone at " nighttime to reduce nocturia and urinary frequency.  -     Ambulatory referral/consult to Urology; Future; Expected date: 07/11/2025    Vitamin D deficiency  Continue vitamin-D supplement.    Primary hypertension  Continue amlodipine 10 mg p.o. daily, losartan 50 mg p.o. daily and spironolactone 25 mg p.o. daily.  - Hypertension Digital Medicine (HDMP) Enrollment Order    Vaginal prolapse  Follow-up with gynecology and urology.    Medication List with Changes/Refills   Current Medications    AMLODIPINE (NORVASC) 10 MG TABLET    TAKE 1 TABLET(10 MG) BY MOUTH EVERY EVENING    CICLOPIROX (PENLAC) 8 % SOLN    Apply topically nightly.    ERGOCALCIFEROL, VITAMIN D2, (VITAMIN D ORAL)    Take by mouth.    LIDOCAINE (XYLOCAINE) 5 % OINT OINTMENT    Apply topically 2 (two) times a day.    LOSARTAN (COZAAR) 50 MG TABLET    Take 1 tablet (50 mg total) by mouth once daily.    SPIRONOLACTONE (ALDACTONE) 25 MG TABLET    TAKE 1 TABLET(25 MG) BY MOUTH DAILY    TRIAMCINOLONE ACETONIDE 0.1% (KENALOG) 0.1 % OINTMENT    Apply topically 4 (four) times daily.                Shelley Persaud, DNP, APRN, FNP-C  Family Medicine  [facility]  07/11/2025 10:48 AM

## 2025-07-16 NOTE — PROGRESS NOTES
Subjective:       Adama Ortiz is a 49 y.o. female who is a new patient who was referred by Shelley Persaud  for evaluation of UI.      Reports UI - occurred 3 times while sleeping. Nocturnal enureses x 2 in 2 weeks. She will not dream to void and will void prior to awakening. No urge to void at that time. Nocturia x 0-1. Normally with frequency, worsened by meds. She notes some spasms/sensation to push after voiding. Able to hold while sitting but will have urgency/UUI when standing when she tried to hold urine. Some DAVON with strong cough only. Denies dysuria, hematuria, UTIs. Occasional sensation of GILDA. Denies constipation. Notes some fecal smearing with wiping. Prior LE edema, not recently.   Known vaginal prolapse as diagnosed by gyn.  Drinks 1-2 bottles of water daily, note she does not drink much. No nighttime sedatives.     PVR (bladder scan) today - 39cc      The following portions of the patient's history were reviewed and updated as appropriate: allergies, current medications, past family history, past medical history, past social history, past surgical history and problem list.    Review of Systems  Review of systems completed. Pertinent positive and negatives listed in HPI        Objective:    Vitals: Wt 124.5 kg (274 lb 7.6 oz)   LMP 11/07/2023   BMI 50.20 kg/m²     Physical Exam   General: well developed, well nourished in no acute distress  Head: normocephalic, atraumatic  Neck: no obvious enlargement of thyroid  HEENT: EOMI, mucus membranes moist, sclera anicteric, no hearing impairment  Lungs: symmetric expansion, non-labored breathing  Neuro: alert and oriented x 3, no gross deficits  Psych: normal judgment and insight, normal mood/affect and non-anxious  Genitourinary: deferred      Lab Review   Urine analysis today in clinic shows - no urine     Lab Results   Component Value Date    WBC 6.19 05/01/2025    HGB 13.8 05/01/2025    HGB 13.4 03/18/2024    HCT 41.9 05/01/2025    HCT 41.4  03/18/2024    MCV 88 05/01/2025    MCV 90 03/18/2024     05/01/2025     03/18/2024     Lab Results   Component Value Date    CREATININE 0.8 05/01/2025    BUN 9 05/01/2025       Imaging  NA         Assessment/Plan:      1. Nocturnal enuresis    - Discussed possible multifactorial etiology of nocturia   - Discussed fluid management. May need to increase water intake, especially earlier in day   - Consider sleep study   - Reduce PM fluids   - Discussed possible pharmacotherapy for possible OAB, will hold for now. She is reluctant to take any medications.    - Recommend UA and UCx - will drop at lab      2. Mixed incontinence   - Discussed difference of UUI and DAVON components. Reviewed etiology and workup of each.   - DAVON: Kegels, PFPT, pessary, bulking agent, MUS.   - UUI: Behavioral changes, PFPT, anticholinergics, beta-3 agonists. Botox/sacral neuromodulation for refractory UUI.   - Kegels   - Consider med if bothersome   - Avoid bladder irritants. Prelief with acidic foods.          Follow up PRN

## 2025-07-17 ENCOUNTER — OFFICE VISIT (OUTPATIENT)
Dept: UROLOGY | Facility: CLINIC | Age: 50
End: 2025-07-17
Payer: COMMERCIAL

## 2025-07-17 VITALS — WEIGHT: 274.5 LBS | BODY MASS INDEX: 50.2 KG/M2

## 2025-07-17 DIAGNOSIS — N39.44 NOCTURNAL ENURESIS: Primary | ICD-10-CM

## 2025-07-17 DIAGNOSIS — N39.46 MIXED INCONTINENCE URGE AND STRESS: ICD-10-CM

## 2025-07-17 PROCEDURE — 3008F BODY MASS INDEX DOCD: CPT | Mod: CPTII,S$GLB,, | Performed by: UROLOGY

## 2025-07-17 PROCEDURE — 4010F ACE/ARB THERAPY RXD/TAKEN: CPT | Mod: CPTII,S$GLB,, | Performed by: UROLOGY

## 2025-07-17 PROCEDURE — 99203 OFFICE O/P NEW LOW 30 MIN: CPT | Mod: S$GLB,,, | Performed by: UROLOGY

## 2025-07-17 PROCEDURE — 1159F MED LIST DOCD IN RCRD: CPT | Mod: CPTII,S$GLB,, | Performed by: UROLOGY

## 2025-07-17 PROCEDURE — 99999 PR PBB SHADOW E&M-EST. PATIENT-LVL III: CPT | Mod: PBBFAC,,, | Performed by: UROLOGY

## 2025-07-17 PROCEDURE — 3044F HG A1C LEVEL LT 7.0%: CPT | Mod: CPTII,S$GLB,, | Performed by: UROLOGY

## 2025-07-17 PROCEDURE — 1160F RVW MEDS BY RX/DR IN RCRD: CPT | Mod: CPTII,S$GLB,, | Performed by: UROLOGY

## 2025-07-17 NOTE — PATIENT INSTRUCTIONS
Nocturia    Nocturia is a condition in which you wake up during the night because you have to urinate. Causes include high fluid intake, sleep disorders, and bladder obstruction. Treatment includes certain activities, such as restricting fluids. There are also medications that reduce symptoms of overactive bladder.    What is nocturia?  Nocturia is a condition in which you wake up during the night because you have to urinate. This condition becomes more common as people age and occurs in both men and women, sometimes for different reasons.    What are the causes of nocturia?  There are many possible causes of nocturia, depending on the type:    Causes of polyuria   - High fluid intake   - Untreated diabetes (Type 1 and Type 2)   - Diabetes insipidus, gestational diabetes (occurs during pregnancy)    Causes of nocturnal polyuria   - Congestive heart failure   - Edema of lower extremities (swelling of the legs)   - Sleeping disorders such as obstructive sleep apnea (breathing is interrupted or stops many times during sleep)   - Certain drugs, including diuretics (water pills), cardiac glycosides, demeclocycline, lithium, methoxyflurane, phenytoin, propoxyphene, and excessive vitamin D   - Drinking too much fluid before bedtime, especially coffee, caffeinated beverages, or alcohol    Causes of low nocturnal bladder capacity   - Bladder obstruction   - Bladder overactivity   - Bladder infection or recurrent urinary tract infection   - Bladder inflammation (swelling)   - Interstitial cystitis (pain in the bladder)   - Bladder malignancy   - Benign prostatic hyperplasia (men), a non-cancerous overgrowth of the prostate that obstructs the flow of urine    Possible causes of mixed nocturia   - Any of the possible causes listed under nocturnal polyuria and low nocturnal bladder capacity    What are the symptoms of nocturia?  Normally, you should be able to sleep six to eight hours during the night without having to get up to  go to the bathroom. People who have nocturia wake up more than once a night to urinate. This can cause disruptions in a normal sleep cycle.    Nocturia may result from several different causes:   - You produce a great deal of urine (more than 2 liters) a day (polyuria)   - Your body produces a large volume of urine while you sleep (nocturnal polyuria)   - You produce more urine at night than your bladder is able to hold (low nocturnal bladder capacity). This causes you to wake up at night because you need to empty your bladder.   - A combination of nocturnal polyuria and low nocturnal bladder capacity (mixed nocturia)  Poor sleep: Some people who have poor sleep and awaken frequently will go to the bathroom whenever they awaken. Typically in these cases, it is not the need to void that awakens them.    How is nocturia diagnosed?  To help your doctor diagnose nocturia, you can keep a fluid and voiding diary. This is a 2-3 day record of how much you drink, how often you have to go the bathroom and the urine output, any medications you are taking, any urinary tract infections, and any related symptoms. Review of the diary can help to determine the possible cause(s) of and treatment for the nocturia.    In addition to reviewing your voiding diary, your doctor may order a urinalysis to examine the urine for infection.    Consider the following questions:   - When did this condition start?   - How many times do you need to urinate each night?   - Is there a large or small volume of urine when you void at night?   - Has there been a change in urination output (increase or decrease)?   - How much caffeine do you drink each day, if any?   - Does frequent urination during the night keep you from getting enough sleep?   - Do you drink alcoholic beverages? If so, how much each day?   - Has your diet changed recently?    Is nocturia treatable?  Treatment depends on the type and cause of nocturia. If sleep apnea is considered,  you may be referred to a sleep specialist.    Treatment options for nocturia may include:    Interventions:   - Restrict fluids in the evening (especially coffee, caffeinated beverages, and alcohol).   - Time intake of diuretics (take mid- to late afternoon, six hours before bedtime).   - Take afternoon naps.   - Elevate the legs (helps prevent fluid accumulation).   - Wear compression stockings (helps prevent fluid accumulation).    Medications:   - Anticholinergic medications: reduce symptoms of overactive bladder   - Desmopressin (DDAVP®): helps the kidneys produce less urine. Requires routine monitoring of electrolytes in blood.

## 2025-07-24 ENCOUNTER — CLINICAL SUPPORT (OUTPATIENT)
Dept: FAMILY MEDICINE | Facility: CLINIC | Age: 50
End: 2025-07-24
Payer: COMMERCIAL

## 2025-07-24 VITALS — SYSTOLIC BLOOD PRESSURE: 138 MMHG | DIASTOLIC BLOOD PRESSURE: 92 MMHG

## 2025-07-24 DIAGNOSIS — I10 HYPERTENSION, UNSPECIFIED TYPE: Primary | ICD-10-CM

## 2025-07-24 PROCEDURE — 99999 PR PBB SHADOW E&M-EST. PATIENT-LVL I: CPT | Mod: PBBFAC,,,

## 2025-07-24 NOTE — PROGRESS NOTES
Adama Ortiz 49 y.o. female is here today for Blood Pressure check.   History of HTN yes.    Review of patient's allergies indicates:   Allergen Reactions    Aspirin Swelling    Keflex [cephalexin] Swelling and Rash     Creatinine   Date Value Ref Range Status   05/01/2025 0.8 0.5 - 1.4 mg/dL Final     Sodium   Date Value Ref Range Status   05/01/2025 143 136 - 145 mmol/L Final   03/18/2024 142 136 - 145 mmol/L Final     Potassium   Date Value Ref Range Status   05/01/2025 3.9 3.5 - 5.1 mmol/L Final   03/18/2024 3.6 3.5 - 5.1 mmol/L Final   ]  Patient denies taking blood pressure medications on a regular basis at the same time of the day.  Not consistent with Amlodipine since changing routine from taking at night to taking medication in the morning -- about 2 weeks.  Current Medications[1]  Does patient have record of home blood pressure readings no. Readings have been averaging - no readings .   Last dose of blood pressure medication was taken at 7/23/2025@8:00 pm.  Patient is asymptomatic.   Complains of - no complaints.    138/92 right arm, sitting, manual       NP Hung  informed of nurse visit.  Patient advised to continue current medication and be consistent with regimen.  Patient advised to return in 2 weeks for bp check.  Patient verbalized understanding.           [1]   Current Outpatient Medications:     amLODIPine (NORVASC) 10 MG tablet, TAKE 1 TABLET(10 MG) BY MOUTH EVERY EVENING, Disp: 90 tablet, Rfl: 2    ciclopirox (PENLAC) 8 % Soln, Apply topically nightly., Disp: 6.6 mL, Rfl: 3    ergocalciferol, vitamin D2, (VITAMIN D ORAL), Take by mouth., Disp: , Rfl:     LIDOcaine (XYLOCAINE) 5 % Oint ointment, Apply topically 2 (two) times a day., Disp: 240 g, Rfl: 3    losartan (COZAAR) 50 MG tablet, Take 1 tablet (50 mg total) by mouth once daily., Disp: 90 tablet, Rfl: 3    spironolactone (ALDACTONE) 25 MG tablet, TAKE 1 TABLET(25 MG) BY MOUTH DAILY, Disp: 90 tablet, Rfl: 1    triamcinolone acetonide  0.1% (KENALOG) 0.1 % ointment, Apply topically 4 (four) times daily., Disp: 454 g, Rfl: 2

## 2025-08-07 ENCOUNTER — CLINICAL SUPPORT (OUTPATIENT)
Dept: FAMILY MEDICINE | Facility: CLINIC | Age: 50
End: 2025-08-07
Payer: COMMERCIAL

## 2025-08-07 VITALS — HEART RATE: 69 BPM | DIASTOLIC BLOOD PRESSURE: 76 MMHG | SYSTOLIC BLOOD PRESSURE: 138 MMHG

## 2025-08-07 DIAGNOSIS — I10 HYPERTENSION, UNSPECIFIED TYPE: Primary | ICD-10-CM

## 2025-08-07 PROCEDURE — 99999 PR PBB SHADOW E&M-EST. PATIENT-LVL II: CPT | Mod: PBBFAC,,,

## 2025-08-07 NOTE — PROGRESS NOTES
Adama Ortiz 50 y.o. female is here today for Blood Pressure check.   History of HTN yes.    Review of patient's allergies indicates:   Allergen Reactions    Aspirin Swelling    Keflex [cephalexin] Swelling and Rash     Creatinine   Date Value Ref Range Status   05/01/2025 0.8 0.5 - 1.4 mg/dL Final     Sodium   Date Value Ref Range Status   05/01/2025 143 136 - 145 mmol/L Final   03/18/2024 142 136 - 145 mmol/L Final     Potassium   Date Value Ref Range Status   05/01/2025 3.9 3.5 - 5.1 mmol/L Final   03/18/2024 3.6 3.5 - 5.1 mmol/L Final   ]  Patient verifies taking blood pressure medications on a regular basis at the same time of the day.   Current Medications[1]  Does patient have record of home blood pressure readings no. Readings have been averaging no home reading at this time.   Last dose of blood pressure medication was taken at 7:30pm last night.  Patient is asymptomatic.   Complains of no complaints at this time but is off schedule with medication regimen, patient was encourage to set a daily reminder.    Patient blood pressure reading while sitting 142/84 left arm (manual) pulse 75. Patient instructed to wait 15 minutes for a second reading.    Blood pressure reading after 15 minutes was 138/76, Pulse 69. Patient scheduled for a two week blood pressure check on 08/21/2025 at 8:20am  Shelley Persaud DNP, notified.           [1]   Current Outpatient Medications:     amLODIPine (NORVASC) 10 MG tablet, TAKE 1 TABLET(10 MG) BY MOUTH EVERY EVENING, Disp: 90 tablet, Rfl: 2    ciclopirox (PENLAC) 8 % Soln, Apply topically nightly., Disp: 6.6 mL, Rfl: 3    ergocalciferol, vitamin D2, (VITAMIN D ORAL), Take by mouth., Disp: , Rfl:     LIDOcaine (XYLOCAINE) 5 % Oint ointment, Apply topically 2 (two) times a day., Disp: 240 g, Rfl: 3    losartan (COZAAR) 50 MG tablet, Take 1 tablet (50 mg total) by mouth once daily., Disp: 90 tablet, Rfl: 3    spironolactone (ALDACTONE) 25 MG tablet, TAKE 1 TABLET(25 MG) BY  MOUTH DAILY, Disp: 90 tablet, Rfl: 1    triamcinolone acetonide 0.1% (KENALOG) 0.1 % ointment, Apply topically 4 (four) times daily., Disp: 454 g, Rfl: 2

## 2025-09-03 ENCOUNTER — PATIENT MESSAGE (OUTPATIENT)
Dept: FAMILY MEDICINE | Facility: CLINIC | Age: 50
End: 2025-09-03